# Patient Record
Sex: MALE | Race: WHITE | NOT HISPANIC OR LATINO
[De-identification: names, ages, dates, MRNs, and addresses within clinical notes are randomized per-mention and may not be internally consistent; named-entity substitution may affect disease eponyms.]

---

## 2017-02-08 ENCOUNTER — RX RENEWAL (OUTPATIENT)
Age: 67
End: 2017-02-08

## 2017-03-16 ENCOUNTER — APPOINTMENT (OUTPATIENT)
Dept: PULMONOLOGY | Facility: CLINIC | Age: 67
End: 2017-03-16

## 2017-03-16 VITALS — OXYGEN SATURATION: 96 % | HEART RATE: 62 BPM | DIASTOLIC BLOOD PRESSURE: 68 MMHG | SYSTOLIC BLOOD PRESSURE: 128 MMHG

## 2017-03-16 VITALS — WEIGHT: 255 LBS | BODY MASS INDEX: 38.77 KG/M2

## 2017-03-27 ENCOUNTER — APPOINTMENT (OUTPATIENT)
Dept: PULMONOLOGY | Facility: CLINIC | Age: 67
End: 2017-03-27

## 2017-03-27 VITALS
HEART RATE: 76 BPM | SYSTOLIC BLOOD PRESSURE: 120 MMHG | BODY MASS INDEX: 38.95 KG/M2 | DIASTOLIC BLOOD PRESSURE: 70 MMHG | WEIGHT: 257 LBS | HEIGHT: 68 IN | OXYGEN SATURATION: 97 %

## 2017-03-30 ENCOUNTER — OUTPATIENT (OUTPATIENT)
Dept: OUTPATIENT SERVICES | Facility: HOSPITAL | Age: 67
LOS: 1 days | End: 2017-03-30
Payer: MEDICARE

## 2017-03-30 DIAGNOSIS — G47.33 OBSTRUCTIVE SLEEP APNEA (ADULT) (PEDIATRIC): ICD-10-CM

## 2017-03-30 PROCEDURE — 95811 POLYSOM 6/>YRS CPAP 4/> PARM: CPT | Mod: 26

## 2017-03-30 PROCEDURE — 95811 POLYSOM 6/>YRS CPAP 4/> PARM: CPT

## 2017-04-04 ENCOUNTER — RX RENEWAL (OUTPATIENT)
Age: 67
End: 2017-04-04

## 2017-04-17 ENCOUNTER — APPOINTMENT (OUTPATIENT)
Dept: PULMONOLOGY | Facility: CLINIC | Age: 67
End: 2017-04-17

## 2017-04-17 VITALS — OXYGEN SATURATION: 95 % | HEART RATE: 82 BPM

## 2017-04-17 VITALS — DIASTOLIC BLOOD PRESSURE: 64 MMHG | BODY MASS INDEX: 38.93 KG/M2 | SYSTOLIC BLOOD PRESSURE: 126 MMHG | WEIGHT: 256 LBS

## 2017-04-19 ENCOUNTER — APPOINTMENT (OUTPATIENT)
Dept: PULMONOLOGY | Facility: CLINIC | Age: 67
End: 2017-04-19

## 2017-04-19 VITALS
HEART RATE: 61 BPM | SYSTOLIC BLOOD PRESSURE: 128 MMHG | OXYGEN SATURATION: 98 % | BODY MASS INDEX: 38.62 KG/M2 | RESPIRATION RATE: 16 BRPM | WEIGHT: 254 LBS | DIASTOLIC BLOOD PRESSURE: 80 MMHG

## 2017-04-27 ENCOUNTER — APPOINTMENT (OUTPATIENT)
Dept: CT IMAGING | Facility: CLINIC | Age: 67
End: 2017-04-27

## 2017-05-07 ENCOUNTER — APPOINTMENT (OUTPATIENT)
Dept: POPULATION HEALTH | Facility: CLINIC | Age: 67
End: 2017-05-07
Payer: COMMERCIAL

## 2017-05-07 ENCOUNTER — OUTPATIENT (OUTPATIENT)
Dept: OUTPATIENT SERVICES | Facility: HOSPITAL | Age: 67
LOS: 1 days | End: 2017-05-07
Payer: COMMERCIAL

## 2017-05-07 DIAGNOSIS — Z77.090 CONTACT WITH AND (SUSPECTED) EXPOSURE TO ASBESTOS: ICD-10-CM

## 2017-05-07 PROCEDURE — 94010 BREATHING CAPACITY TEST: CPT

## 2017-05-07 PROCEDURE — 71250 CT THORAX DX C-: CPT | Mod: 26

## 2017-05-07 PROCEDURE — 99202 OFFICE O/P NEW SF 15 MIN: CPT | Mod: 25

## 2017-05-07 PROCEDURE — 71250 CT THORAX DX C-: CPT

## 2017-05-07 PROCEDURE — 93000 ELECTROCARDIOGRAM COMPLETE: CPT

## 2017-05-07 PROCEDURE — 36415 COLL VENOUS BLD VENIPUNCTURE: CPT

## 2017-06-05 ENCOUNTER — MED ADMIN CHARGE (OUTPATIENT)
Age: 67
End: 2017-06-05

## 2017-06-26 ENCOUNTER — RX RENEWAL (OUTPATIENT)
Age: 67
End: 2017-06-26

## 2017-07-18 ENCOUNTER — APPOINTMENT (OUTPATIENT)
Dept: PULMONOLOGY | Facility: CLINIC | Age: 67
End: 2017-07-18

## 2017-07-19 ENCOUNTER — APPOINTMENT (OUTPATIENT)
Dept: PULMONOLOGY | Facility: CLINIC | Age: 67
End: 2017-07-19

## 2017-07-19 VITALS — OXYGEN SATURATION: 95 % | HEART RATE: 63 BPM

## 2017-07-19 VITALS — BODY MASS INDEX: 39.38 KG/M2 | WEIGHT: 259 LBS | SYSTOLIC BLOOD PRESSURE: 124 MMHG | DIASTOLIC BLOOD PRESSURE: 70 MMHG

## 2017-07-19 RX ORDER — AZITHROMYCIN 250 MG/1
250 TABLET, FILM COATED ORAL
Qty: 6 | Refills: 0 | Status: DISCONTINUED | COMMUNITY
Start: 2017-06-27

## 2017-11-11 ENCOUNTER — TRANSCRIPTION ENCOUNTER (OUTPATIENT)
Age: 67
End: 2017-11-11

## 2017-11-17 ENCOUNTER — APPOINTMENT (OUTPATIENT)
Dept: PULMONOLOGY | Facility: CLINIC | Age: 67
End: 2017-11-17
Payer: MEDICARE

## 2017-11-17 VITALS — HEART RATE: 69 BPM | OXYGEN SATURATION: 94 % | SYSTOLIC BLOOD PRESSURE: 128 MMHG | DIASTOLIC BLOOD PRESSURE: 78 MMHG

## 2017-11-17 DIAGNOSIS — R07.89 OTHER CHEST PAIN: ICD-10-CM

## 2017-11-17 PROCEDURE — 85018 HEMOGLOBIN: CPT | Mod: QW

## 2017-11-17 PROCEDURE — 94727 GAS DIL/WSHOT DETER LNG VOL: CPT

## 2017-11-17 PROCEDURE — 99215 OFFICE O/P EST HI 40 MIN: CPT | Mod: 25

## 2017-11-17 PROCEDURE — 94060 EVALUATION OF WHEEZING: CPT

## 2017-11-17 PROCEDURE — 94729 DIFFUSING CAPACITY: CPT

## 2017-11-17 PROCEDURE — 94664 DEMO&/EVAL PT USE INHALER: CPT | Mod: 59

## 2017-11-17 RX ORDER — PIRFENIDONE 267 MG/1
267 CAPSULE ORAL
Qty: 270 | Refills: 1 | Status: DISCONTINUED | OUTPATIENT
Start: 2017-07-19 | End: 2017-11-17

## 2017-11-17 RX ORDER — BRIMONIDINE TARTRATE 1 MG/ML
0.1 SOLUTION/ DROPS OPHTHALMIC
Qty: 5 | Refills: 0 | Status: DISCONTINUED | COMMUNITY
Start: 2017-04-13 | End: 2017-11-17

## 2017-11-17 RX ORDER — PROMETHAZINEHYDROCHLORIDE AND PHENYLEPHRINE HYDROCHLORIDE 6.25; 5 MG/5ML; MG/5ML
6.25-5 SYRUP ORAL
Qty: 240 | Refills: 0 | Status: DISCONTINUED | COMMUNITY
Start: 2017-06-27 | End: 2017-11-17

## 2017-12-06 ENCOUNTER — APPOINTMENT (OUTPATIENT)
Dept: PULMONOLOGY | Facility: CLINIC | Age: 67
End: 2017-12-06
Payer: MEDICARE

## 2017-12-06 VITALS
SYSTOLIC BLOOD PRESSURE: 140 MMHG | OXYGEN SATURATION: 94 % | BODY MASS INDEX: 40.32 KG/M2 | HEART RATE: 78 BPM | WEIGHT: 266 LBS | DIASTOLIC BLOOD PRESSURE: 60 MMHG | HEIGHT: 68 IN

## 2017-12-06 PROCEDURE — 99215 OFFICE O/P EST HI 40 MIN: CPT

## 2018-01-02 ENCOUNTER — RX RENEWAL (OUTPATIENT)
Age: 68
End: 2018-01-02

## 2018-03-12 ENCOUNTER — APPOINTMENT (OUTPATIENT)
Dept: PULMONOLOGY | Facility: CLINIC | Age: 68
End: 2018-03-12
Payer: MEDICARE

## 2018-03-12 VITALS
SYSTOLIC BLOOD PRESSURE: 130 MMHG | DIASTOLIC BLOOD PRESSURE: 70 MMHG | BODY MASS INDEX: 39.86 KG/M2 | HEART RATE: 76 BPM | WEIGHT: 263 LBS | HEIGHT: 68 IN | OXYGEN SATURATION: 94 %

## 2018-03-12 VITALS — OXYGEN SATURATION: 95 %

## 2018-03-12 PROCEDURE — 99215 OFFICE O/P EST HI 40 MIN: CPT

## 2018-04-02 ENCOUNTER — RX RENEWAL (OUTPATIENT)
Age: 68
End: 2018-04-02

## 2018-05-14 ENCOUNTER — RX RENEWAL (OUTPATIENT)
Age: 68
End: 2018-05-14

## 2018-06-12 ENCOUNTER — RX RENEWAL (OUTPATIENT)
Age: 68
End: 2018-06-12

## 2018-07-10 RX ORDER — LOTEPREDNOL ETABONATE 5 MG/ML
0.5 SUSPENSION/ DROPS OPHTHALMIC
Qty: 5 | Refills: 0 | Status: DISCONTINUED | COMMUNITY
Start: 2017-11-09 | End: 2018-07-10

## 2018-07-10 RX ORDER — PIRFENIDONE 267 MG/1
267 CAPSULE ORAL 3 TIMES DAILY
Qty: 270 | Refills: 5 | Status: DISCONTINUED | OUTPATIENT
Start: 2017-12-07 | End: 2018-07-10

## 2018-07-11 ENCOUNTER — APPOINTMENT (OUTPATIENT)
Dept: PULMONOLOGY | Facility: CLINIC | Age: 68
End: 2018-07-11
Payer: MEDICARE

## 2018-07-11 VITALS — SYSTOLIC BLOOD PRESSURE: 132 MMHG | OXYGEN SATURATION: 95 % | DIASTOLIC BLOOD PRESSURE: 70 MMHG | HEART RATE: 64 BPM

## 2018-07-11 VITALS — WEIGHT: 245 LBS | BODY MASS INDEX: 37.25 KG/M2

## 2018-07-11 DIAGNOSIS — Z87.898 PERSONAL HISTORY OF OTHER SPECIFIED CONDITIONS: ICD-10-CM

## 2018-07-11 PROCEDURE — 94727 GAS DIL/WSHOT DETER LNG VOL: CPT

## 2018-07-11 PROCEDURE — 99215 OFFICE O/P EST HI 40 MIN: CPT | Mod: 25

## 2018-07-11 PROCEDURE — 85018 HEMOGLOBIN: CPT | Mod: QW

## 2018-07-11 PROCEDURE — 94729 DIFFUSING CAPACITY: CPT

## 2018-07-11 PROCEDURE — 94010 BREATHING CAPACITY TEST: CPT

## 2018-07-11 RX ORDER — LEVOCETIRIZINE DIHYDROCHLORIDE 5 MG/1
5 TABLET ORAL
Qty: 7 | Refills: 0 | Status: DISCONTINUED | COMMUNITY
Start: 2016-10-18 | End: 2018-07-11

## 2018-07-26 ENCOUNTER — MESSAGE (OUTPATIENT)
Age: 68
End: 2018-07-26

## 2018-08-06 ENCOUNTER — RX RENEWAL (OUTPATIENT)
Age: 68
End: 2018-08-06

## 2018-09-23 ENCOUNTER — OUTPATIENT (OUTPATIENT)
Dept: OUTPATIENT SERVICES | Facility: HOSPITAL | Age: 68
LOS: 1 days | End: 2018-09-23
Payer: COMMERCIAL

## 2018-09-23 ENCOUNTER — APPOINTMENT (OUTPATIENT)
Dept: POPULATION HEALTH | Facility: CLINIC | Age: 68
End: 2018-09-23
Payer: COMMERCIAL

## 2018-09-23 DIAGNOSIS — Z77.090 CONTACT WITH AND (SUSPECTED) EXPOSURE TO ASBESTOS: ICD-10-CM

## 2018-09-23 PROCEDURE — 94010 BREATHING CAPACITY TEST: CPT

## 2018-09-23 PROCEDURE — 99212 OFFICE O/P EST SF 10 MIN: CPT | Mod: 25

## 2018-09-23 PROCEDURE — 93000 ELECTROCARDIOGRAM COMPLETE: CPT

## 2018-09-23 PROCEDURE — 36415 COLL VENOUS BLD VENIPUNCTURE: CPT

## 2018-09-23 PROCEDURE — 71250 CT THORAX DX C-: CPT

## 2018-09-23 PROCEDURE — 71250 CT THORAX DX C-: CPT | Mod: 26

## 2018-11-15 ENCOUNTER — APPOINTMENT (OUTPATIENT)
Dept: PULMONOLOGY | Facility: CLINIC | Age: 68
End: 2018-11-15
Payer: MEDICARE

## 2018-11-15 VITALS
OXYGEN SATURATION: 95 % | HEART RATE: 64 BPM | SYSTOLIC BLOOD PRESSURE: 142 MMHG | BODY MASS INDEX: 39.53 KG/M2 | DIASTOLIC BLOOD PRESSURE: 82 MMHG | WEIGHT: 260 LBS

## 2018-11-15 PROCEDURE — 99214 OFFICE O/P EST MOD 30 MIN: CPT

## 2018-11-15 RX ORDER — LISINOPRIL 20 MG/1
20 TABLET ORAL
Refills: 0 | Status: DISCONTINUED | COMMUNITY
Start: 2017-11-17 | End: 2018-11-15

## 2018-11-15 RX ORDER — PIRFENIDONE 267 MG/1
267 CAPSULE ORAL
Qty: 207 | Refills: 0 | Status: DISCONTINUED | OUTPATIENT
Start: 2017-07-19 | End: 2018-11-15

## 2018-11-15 RX ORDER — PIRFENIDONE 267 MG/1
267 TABLET, COATED ORAL
Qty: 270 | Refills: 5 | Status: DISCONTINUED | OUTPATIENT
Start: 2018-01-02 | End: 2018-11-15

## 2018-11-15 RX ORDER — TIOTROPIUM BROMIDE 18 UG/1
18 CAPSULE ORAL; RESPIRATORY (INHALATION) DAILY
Qty: 1 | Refills: 5 | Status: DISCONTINUED | COMMUNITY
Start: 2017-12-06 | End: 2018-11-15

## 2019-03-01 ENCOUNTER — RX RENEWAL (OUTPATIENT)
Age: 69
End: 2019-03-01

## 2019-03-01 ENCOUNTER — MEDICATION RENEWAL (OUTPATIENT)
Age: 69
End: 2019-03-01

## 2019-03-01 RX ORDER — TIOTROPIUM BROMIDE 18 UG/1
18 CAPSULE ORAL; RESPIRATORY (INHALATION) DAILY
Qty: 1 | Refills: 5 | Status: DISCONTINUED | COMMUNITY
Start: 2018-11-15 | End: 2019-03-01

## 2019-03-03 ENCOUNTER — TRANSCRIPTION ENCOUNTER (OUTPATIENT)
Age: 69
End: 2019-03-03

## 2019-03-14 ENCOUNTER — APPOINTMENT (OUTPATIENT)
Dept: PULMONOLOGY | Facility: CLINIC | Age: 69
End: 2019-03-14
Payer: MEDICARE

## 2019-03-14 VITALS — BODY MASS INDEX: 40.45 KG/M2 | WEIGHT: 266 LBS

## 2019-03-14 VITALS — DIASTOLIC BLOOD PRESSURE: 60 MMHG | OXYGEN SATURATION: 95 % | SYSTOLIC BLOOD PRESSURE: 120 MMHG | HEART RATE: 64 BPM

## 2019-03-14 PROCEDURE — 94729 DIFFUSING CAPACITY: CPT

## 2019-03-14 PROCEDURE — 94010 BREATHING CAPACITY TEST: CPT

## 2019-03-14 PROCEDURE — 99214 OFFICE O/P EST MOD 30 MIN: CPT | Mod: 25

## 2019-03-14 PROCEDURE — 85018 HEMOGLOBIN: CPT | Mod: QW

## 2019-03-14 PROCEDURE — 94727 GAS DIL/WSHOT DETER LNG VOL: CPT

## 2019-03-14 NOTE — PHYSICAL EXAM
[General Appearance - Well Developed] : well developed [Normal Appearance] : normal appearance [General Appearance - Well Nourished] : well nourished [No Deformities] : no deformities [III] : III [Neck Appearance] : the appearance of the neck was normal [Neck Cervical Mass (___cm)] : no neck mass was observed [Heart Rate And Rhythm] : heart rate and rhythm were normal [Heart Sounds] : normal S1 and S2 [Murmurs] : no murmurs present [Edema] : no peripheral edema present [Respiration, Rhythm And Depth] : normal respiratory rhythm and effort [Exaggerated Use Of Accessory Muscles For Inspiration] : no accessory muscle use [Lungs Percussion] : the lungs were normal to percussion [Abnormal Walk] : normal gait [Nail Clubbing] : no clubbing of the fingernails [No Focal Deficits] : no focal deficits [Oriented To Time, Place, And Person] : oriented to person, place, and time [Impaired Insight] : insight and judgment were intact [Affect] : the affect was normal [Memory Recent] : recent memory was not impaired [] : no rash [FreeTextEntry1] : no chest wall abn

## 2019-03-14 NOTE — CONSULT LETTER
[Dear  ___] : Dear  [unfilled], [Consult Letter:] : I had the pleasure of evaluating your patient, [unfilled]. [Please see my note below.] : Please see my note below. [Consult Closing:] : Thank you very much for allowing me to participate in the care of this patient.  If you have any questions, please do not hesitate to contact me. [Sincerely,] : Sincerely, [DrIsaias  ___] : Dr. CHAPA [[facility]kinson [facility], [facility]] : [facility], [facility] [Director, Respiratory Care] : Director, Respiratory Care [[facility]] : [facility] [FreeTextEntry3] : [facility]\par Pul[facility] Critical Care\par Director Pulmonary Division\par Medical Director Respiratory Therapy\par [facility]\par \par

## 2019-03-14 NOTE — PROCEDURE
[FreeTextEntry1] : CT chest reviewed May 14, 2016 mild interstitial nodular/centrilobular densities with mild emphysema small calcified nodules noted\par Recent spirometry with moderate airflow obstruction\par \par CTA reviewed 9/16;\par No pulmonary embolism mild interstitial prominence at bases\par \par Echo:  7/18normal LV, mild LAE, no sig pul htn\par CT ( [facility]) : c/w UIP, bronchial wall thickening\par PFTS:  today combined obstructive and restrictive impairment, mod decrease in DL which corrects\par compared to 11/17, FVC and DL are increased, mild decrease TLC of ? significance\par

## 2019-03-14 NOTE — REASON FOR VISIT
[Follow-Up] : a follow-up visit [Chest Pain] : chest Pain [COPD] : COPD [ILD] : ILD [Shortness of Breath] : shortness of Breath

## 2019-03-14 NOTE — DISCUSSION/SUMMARY
[FreeTextEntry1] : COPD Gold class II complicated by interstitial lung disease component /clinical UIP-  vs asbestosis\par connective tissue serologies are negative, was tolerating perfenidone but denies benefit and stopped, saw second opinion who felt more asbestosis related, pt seeking legal assistance for that \par My review of pulmonary ILD with areas of ossification were less clear and included UIP, NSIP, amyloid\par  also focus on anti reflux therapy given ILD, denies any GERD, offered tertiary care center, he does not want another opinion at this time\par clinically at baseline\par PFTs are stable with exception of decreased DLCO from 2018,\par will repeat CT chest for any progression of ILD\par continue spiriva for COPD component\par obesity with moderate  obstructive sleep apnea - ( AHI 20) intolerant to CPAP in past, saw Sleep MD for oral appliance, compliance stressed\par 4 months or sooner if needed

## 2019-03-14 NOTE — HISTORY OF PRESENT ILLNESS
[FreeTextEntry1] : at baseline\par some weight loss\par  no sig sputum\par no sig change in status\par chronic exertional  dyspnea, no change\par using spiriva\par had sleep study, mod NJ ( AHI 20) has  oral appliance\par former 25 pack yrs smoker, quit 25 yrs ago\par Cardiology [place] gp\par

## 2019-04-25 ENCOUNTER — RX RENEWAL (OUTPATIENT)
Age: 69
End: 2019-04-25

## 2019-05-22 ENCOUNTER — RX RENEWAL (OUTPATIENT)
Age: 69
End: 2019-05-22

## 2019-05-23 ENCOUNTER — FORM ENCOUNTER (OUTPATIENT)
Age: 69
End: 2019-05-23

## 2019-05-24 ENCOUNTER — OUTPATIENT (OUTPATIENT)
Dept: OUTPATIENT SERVICES | Facility: HOSPITAL | Age: 69
LOS: 1 days | End: 2019-05-24

## 2019-05-24 ENCOUNTER — APPOINTMENT (OUTPATIENT)
Dept: CT IMAGING | Facility: CLINIC | Age: 69
End: 2019-05-24
Payer: MEDICARE

## 2019-05-24 DIAGNOSIS — J84.9 INTERSTITIAL PULMONARY DISEASE, UNSPECIFIED: ICD-10-CM

## 2019-05-24 PROCEDURE — 71250 CT THORAX DX C-: CPT | Mod: 26

## 2019-06-10 ENCOUNTER — APPOINTMENT (OUTPATIENT)
Dept: FAMILY MEDICINE | Facility: CLINIC | Age: 69
End: 2019-06-10
Payer: MEDICARE

## 2019-06-10 VITALS — WEIGHT: 266 LBS | TEMPERATURE: 97.6 F | HEIGHT: 68 IN | BODY MASS INDEX: 40.32 KG/M2

## 2019-06-10 VITALS — HEART RATE: 76 BPM | DIASTOLIC BLOOD PRESSURE: 75 MMHG | SYSTOLIC BLOOD PRESSURE: 125 MMHG

## 2019-06-10 PROCEDURE — 99214 OFFICE O/P EST MOD 30 MIN: CPT | Mod: 25

## 2019-06-10 PROCEDURE — 81003 URINALYSIS AUTO W/O SCOPE: CPT | Mod: QW

## 2019-06-10 PROCEDURE — 36415 COLL VENOUS BLD VENIPUNCTURE: CPT

## 2019-06-10 NOTE — PHYSICAL EXAM
[No Acute Distress] : no acute distress [Well Nourished] : well nourished [Normal Sclera/Conjunctiva] : normal sclera/conjunctiva [Well-Appearing] : well-appearing [Well Developed] : well developed [PERRL] : pupils equal round and reactive to light [EOMI] : extraocular movements intact [Normal Outer Ear/Nose] : the outer ears and nose were normal in appearance [Normal Oropharynx] : the oropharynx was normal [No JVD] : no jugular venous distention [Supple] : supple [No Lymphadenopathy] : no lymphadenopathy [No Respiratory Distress] : no respiratory distress  [Thyroid Normal, No Nodules] : the thyroid was normal and there were no nodules present [Clear to Auscultation] : lungs were clear to auscultation bilaterally [No Accessory Muscle Use] : no accessory muscle use [Normal Rate] : normal rate  [Regular Rhythm] : with a regular rhythm [Normal S1, S2] : normal S1 and S2 [No Carotid Bruits] : no carotid bruits [No Murmur] : no murmur heard [No Abdominal Bruit] : a ~M bruit was not heard ~T in the abdomen [No Varicosities] : no varicosities [Pedal Pulses Present] : the pedal pulses are present [No Extremity Clubbing/Cyanosis] : no extremity clubbing/cyanosis [No Edema] : there was no peripheral edema [No Palpable Aorta] : no palpable aorta [Soft] : abdomen soft [Non Tender] : non-tender [Non-distended] : non-distended [No Masses] : no abdominal mass palpated [No HSM] : no HSM [Normal Bowel Sounds] : normal bowel sounds [Normal Anterior Cervical Nodes] : no anterior cervical lymphadenopathy [Normal Posterior Cervical Nodes] : no posterior cervical lymphadenopathy [No Spinal Tenderness] : no spinal tenderness [No CVA Tenderness] : no CVA  tenderness [No Joint Swelling] : no joint swelling [Grossly Normal Strength/Tone] : grossly normal strength/tone [Normal Gait] : normal gait [No Rash] : no rash [Coordination Grossly Intact] : coordination grossly intact [Deep Tendon Reflexes (DTR)] : deep tendon reflexes were 2+ and symmetric [No Focal Deficits] : no focal deficits [Normal Insight/Judgement] : insight and judgment were intact [Normal Affect] : the affect was normal

## 2019-06-12 LAB
ALBUMIN SERPL ELPH-MCNC: 4.3 G/DL
ALP BLD-CCNC: 107 U/L
ALT SERPL-CCNC: 23 U/L
ANION GAP SERPL CALC-SCNC: 13 MMOL/L
AST SERPL-CCNC: 14 U/L
BASOPHILS # BLD AUTO: 0.05 K/UL
BASOPHILS NFR BLD AUTO: 0.5 %
BILIRUB SERPL-MCNC: 0.6 MG/DL
BILIRUB UR QL STRIP: NORMAL
BUN SERPL-MCNC: 14 MG/DL
CALCIUM SERPL-MCNC: 9.6 MG/DL
CHLORIDE SERPL-SCNC: 103 MMOL/L
CHOLEST SERPL-MCNC: 122 MG/DL
CHOLEST/HDLC SERPL: 3.5 RATIO
CO2 SERPL-SCNC: 26 MMOL/L
COLLECTION METHOD: NORMAL
CREAT SERPL-MCNC: 1.44 MG/DL
EOSINOPHIL # BLD AUTO: 0.25 K/UL
EOSINOPHIL NFR BLD AUTO: 2.5 %
GLUCOSE SERPL-MCNC: 179 MG/DL
GLUCOSE UR-MCNC: NORMAL
HCG UR QL: 0.2 EU/DL
HCT VFR BLD CALC: 44.5 %
HDLC SERPL-MCNC: 35 MG/DL
HGB BLD-MCNC: 13.3 G/DL
HGB UR QL STRIP.AUTO: NORMAL
IMM GRANULOCYTES NFR BLD AUTO: 0.6 %
KETONES UR-MCNC: NORMAL
LDLC SERPL CALC-MCNC: 60 MG/DL
LEUKOCYTE ESTERASE UR QL STRIP: NORMAL
LYMPHOCYTES # BLD AUTO: 2.17 K/UL
LYMPHOCYTES NFR BLD AUTO: 21.9 %
MAN DIFF?: NORMAL
MCHC RBC-ENTMCNC: 29.9 GM/DL
MCHC RBC-ENTMCNC: 30.4 PG
MCV RBC AUTO: 101.6 FL
MONOCYTES # BLD AUTO: 0.51 K/UL
MONOCYTES NFR BLD AUTO: 5.1 %
NEUTROPHILS # BLD AUTO: 6.87 K/UL
NEUTROPHILS NFR BLD AUTO: 69.4 %
NITRITE UR QL STRIP: NORMAL
PH UR STRIP: 6
PLATELET # BLD AUTO: 266 K/UL
POTASSIUM SERPL-SCNC: 4.5 MMOL/L
PROT SERPL-MCNC: 7 G/DL
PROT UR STRIP-MCNC: NORMAL
PSA SERPL-MCNC: 3.97 NG/ML
RBC # BLD: 4.38 M/UL
RBC # FLD: 14.3 %
SODIUM SERPL-SCNC: 142 MMOL/L
SP GR UR STRIP: 1
T4 FREE SERPL-MCNC: 1 NG/DL
TRIGL SERPL-MCNC: 137 MG/DL
TSH SERPL-ACNC: 3.91 UIU/ML
WBC # FLD AUTO: 9.91 K/UL

## 2019-07-18 ENCOUNTER — APPOINTMENT (OUTPATIENT)
Dept: PULMONOLOGY | Facility: CLINIC | Age: 69
End: 2019-07-18
Payer: MEDICARE

## 2019-07-18 VITALS
WEIGHT: 265 LBS | HEART RATE: 66 BPM | OXYGEN SATURATION: 94 % | SYSTOLIC BLOOD PRESSURE: 136 MMHG | DIASTOLIC BLOOD PRESSURE: 76 MMHG | BODY MASS INDEX: 40.29 KG/M2

## 2019-07-18 PROCEDURE — 99214 OFFICE O/P EST MOD 30 MIN: CPT

## 2019-07-18 NOTE — DISCUSSION/SUMMARY
[FreeTextEntry1] : COPD Gold class II complicated by interstitial lung disease component /clinical UIP-  vs asbestosis\par connective tissue serologies are negative, was tolerating perfenidone but denies benefit and stopped, saw second opinion who felt more asbestosis related, pt seeking legal assistance for that \par My review of pulmonary ILD with areas of ossification were less clear and included UIP, NSIP, amyloid\par  also focus on anti reflux therapy given ILD, denies any GERD, offered tertiary care center, he does not want another opinion at this time\par repeat CT 5/19 stable\par clinically at baseline\par PFTs are stable with exception of decreased DLCO from 2018,\par continue spiriva for COPD component\par obesity with moderate  obstructive sleep apnea - ( AHI 20) intolerant to CPAP in past, saw Sleep MD for oral appliance, compliance stressed\par weight loss\par 6 months or sooner if needed

## 2019-07-18 NOTE — PHYSICAL EXAM
[General Appearance - Well Developed] : well developed [Normal Appearance] : normal appearance [General Appearance - Well Nourished] : well nourished [No Deformities] : no deformities [III] : III [Neck Appearance] : the appearance of the neck was normal [Neck Cervical Mass (___cm)] : no neck mass was observed [Heart Rate And Rhythm] : heart rate and rhythm were normal [Heart Sounds] : normal S1 and S2 [Murmurs] : no murmurs present [Edema] : no peripheral edema present [Respiration, Rhythm And Depth] : normal respiratory rhythm and effort [Exaggerated Use Of Accessory Muscles For Inspiration] : no accessory muscle use [Lungs Percussion] : the lungs were normal to percussion [FreeTextEntry1] : no chest wall abn [Abnormal Walk] : normal gait [Nail Clubbing] : no clubbing of the fingernails [No Focal Deficits] : no focal deficits [Oriented To Time, Place, And Person] : oriented to person, place, and time [Impaired Insight] : insight and judgment were intact [Affect] : the affect was normal [Memory Recent] : recent memory was not impaired [] : no rash

## 2019-07-18 NOTE — CONSULT LETTER
[Dear  ___] : Dear  [unfilled], [Consult Letter:] : I had the pleasure of evaluating your patient, [unfilled]. [Please see my note below.] : Please see my note below. [Consult Closing:] : Thank you very much for allowing me to participate in the care of this patient.  If you have any questions, please do not hesitate to contact me. [Sincerely,] : Sincerely, [FreeTextEntry3] : [facility]kinson [facility]\par Pulmonary Critical Care\par Director Pulmonary Division\par Medical Director Respiratory Therapy\par [facility]\par \par  [DrIsaias  ___] : Dr. CHAPA [[facility] DO, [facility]] : [facility]e Wilkinson DO, [facility] [Director, Respiratory Care] : Director, Respiratory Care [[facility]] : [facility]

## 2019-07-18 NOTE — HISTORY OF PRESENT ILLNESS
[FreeTextEntry1] : at baseline\par some weight loss\par  no sig sputum\par no sig change in status\par chronic exertional  dyspnea, no change\par using spiriva\par had sleep study, mod NJ ( AHI 20) has  oral appliance\par former 25 pack yrs smoker, quit 25 yrs ago\par Cardiology [place] gp\par

## 2019-08-26 ENCOUNTER — MEDICATION RENEWAL (OUTPATIENT)
Age: 69
End: 2019-08-26

## 2019-09-22 ENCOUNTER — OUTPATIENT (OUTPATIENT)
Dept: OUTPATIENT SERVICES | Facility: HOSPITAL | Age: 69
LOS: 1 days | End: 2019-09-22
Payer: COMMERCIAL

## 2019-09-22 ENCOUNTER — APPOINTMENT (OUTPATIENT)
Dept: POPULATION HEALTH | Facility: CLINIC | Age: 69
End: 2019-09-22
Payer: COMMERCIAL

## 2019-09-22 DIAGNOSIS — Z77.090 CONTACT WITH AND (SUSPECTED) EXPOSURE TO ASBESTOS: ICD-10-CM

## 2019-09-22 PROCEDURE — 94010 BREATHING CAPACITY TEST: CPT

## 2019-09-22 PROCEDURE — 93000 ELECTROCARDIOGRAM COMPLETE: CPT

## 2019-09-22 PROCEDURE — 71250 CT THORAX DX C-: CPT

## 2019-09-22 PROCEDURE — 36415 COLL VENOUS BLD VENIPUNCTURE: CPT

## 2019-09-22 PROCEDURE — 99212 OFFICE O/P EST SF 10 MIN: CPT | Mod: 25

## 2019-09-22 PROCEDURE — 71250 CT THORAX DX C-: CPT | Mod: 26

## 2019-10-07 ENCOUNTER — RX RENEWAL (OUTPATIENT)
Age: 69
End: 2019-10-07

## 2019-10-08 ENCOUNTER — APPOINTMENT (OUTPATIENT)
Dept: FAMILY MEDICINE | Facility: CLINIC | Age: 69
End: 2019-10-08
Payer: MEDICARE

## 2019-10-08 PROCEDURE — 90662 IIV NO PRSV INCREASED AG IM: CPT

## 2019-10-08 PROCEDURE — G0008: CPT

## 2020-01-24 ENCOUNTER — APPOINTMENT (OUTPATIENT)
Dept: PULMONOLOGY | Facility: CLINIC | Age: 70
End: 2020-01-24
Payer: MEDICARE

## 2020-01-24 VITALS
OXYGEN SATURATION: 94 % | BODY MASS INDEX: 42.12 KG/M2 | WEIGHT: 277 LBS | DIASTOLIC BLOOD PRESSURE: 80 MMHG | RESPIRATION RATE: 16 BRPM | SYSTOLIC BLOOD PRESSURE: 140 MMHG | HEART RATE: 72 BPM

## 2020-01-24 PROCEDURE — 94010 BREATHING CAPACITY TEST: CPT

## 2020-01-24 PROCEDURE — 85018 HEMOGLOBIN: CPT | Mod: QW

## 2020-01-24 PROCEDURE — 94727 GAS DIL/WSHOT DETER LNG VOL: CPT

## 2020-01-24 PROCEDURE — 99214 OFFICE O/P EST MOD 30 MIN: CPT | Mod: 25

## 2020-01-24 PROCEDURE — 94729 DIFFUSING CAPACITY: CPT

## 2020-01-24 NOTE — HISTORY OF PRESENT ILLNESS
[FreeTextEntry1] : at baseline\par  no sig sputum\par no sig change in status\par chronic exertional  dyspnea, no change\par using spiriva\par had sleep study, mod NJ ( AHI 20) has  oral appliance\par former 25 pack yrs smoker, quit 25 yrs ago\par Cardiology [place] gp\par

## 2020-01-24 NOTE — PHYSICAL EXAM
[General Appearance - Well Developed] : well developed [Normal Appearance] : normal appearance [General Appearance - Well Nourished] : well nourished [No Deformities] : no deformities [III] : III [Neck Cervical Mass (___cm)] : no neck mass was observed [Neck Appearance] : the appearance of the neck was normal [Heart Sounds] : normal S1 and S2 [Heart Rate And Rhythm] : heart rate and rhythm were normal [Murmurs] : no murmurs present [Edema] : no peripheral edema present [Respiration, Rhythm And Depth] : normal respiratory rhythm and effort [Exaggerated Use Of Accessory Muscles For Inspiration] : no accessory muscle use [Lungs Percussion] : the lungs were normal to percussion [Abnormal Walk] : normal gait [Nail Clubbing] : no clubbing of the fingernails [No Focal Deficits] : no focal deficits [Oriented To Time, Place, And Person] : oriented to person, place, and time [Impaired Insight] : insight and judgment were intact [Affect] : the affect was normal [Memory Recent] : recent memory was not impaired [] : no rash [FreeTextEntry1] : no chest wall abn

## 2020-01-24 NOTE — DISCUSSION/SUMMARY
[FreeTextEntry1] : COPD Gold class II complicated by interstitial lung disease component /clinical UIP-  vs asbestosis\par connective tissue serologies are negative, was tolerating perfenidone but denies benefit and stopped, saw second opinion who felt more asbestosis related, pt getting legal assistance for that \par My review of pulmonary ILD with areas of ossification were less clear and included UIP, NSIP, amyloid\par  also focus on anti reflux therapy given ILD, denies any GERD, offered tertiary care center, he does not want another opinion at this time\par repeat CT 5/19 stable, repeat 5/20\par clinically at baseline\par PFTs are stable \par continue spiriva for COPD component\par obesity with moderate  obstructive sleep apnea - ( AHI 20) intolerant to CPAP in past, saw Sleep MD for oral appliance, compliance stressed\par weight loss\par 6 months or sooner if needed

## 2020-01-24 NOTE — CONSULT LETTER
[Please see my note below.] : Please see my note below. [Consult Letter:] : I had the pleasure of evaluating your patient, [unfilled]. [Dear  ___] : Dear  [unfilled], [Sincerely,] : Sincerely, [Consult Closing:] : Thank you very much for allowing me to participate in the care of this patient.  If you have any questions, please do not hesitate to contact me. [DrIsaias  ___] : Dr. CHAPA [[facility] DO, [facility]] : Fede Wiliknson DO, [facility] [[facility]] : [facility] [Director, Respiratory Care] : Director, Respiratory Care [FreeTextEntry3] : [facility] DO [facility]\par Pulmonary Critical Care\par Director Pulmonary Division\par Medical Director Respiratory Therapy\par [facility]\par \par

## 2020-02-05 ENCOUNTER — APPOINTMENT (OUTPATIENT)
Dept: FAMILY MEDICINE | Facility: CLINIC | Age: 70
End: 2020-02-05
Payer: MEDICARE

## 2020-02-05 VITALS — HEART RATE: 78 BPM | SYSTOLIC BLOOD PRESSURE: 120 MMHG | DIASTOLIC BLOOD PRESSURE: 75 MMHG

## 2020-02-05 VITALS
TEMPERATURE: 98.4 F | RESPIRATION RATE: 14 BRPM | BODY MASS INDEX: 38.8 KG/M2 | HEART RATE: 96 BPM | OXYGEN SATURATION: 95 % | HEIGHT: 68 IN | WEIGHT: 256 LBS

## 2020-02-05 DIAGNOSIS — Z87.09 PERSONAL HISTORY OF OTHER DISEASES OF THE RESPIRATORY SYSTEM: ICD-10-CM

## 2020-02-05 DIAGNOSIS — R04.0 EPISTAXIS: ICD-10-CM

## 2020-02-05 PROCEDURE — 99213 OFFICE O/P EST LOW 20 MIN: CPT

## 2020-02-05 NOTE — HISTORY OF PRESENT ILLNESS
[Cold Symptoms] : cold symptoms [___ Days ago] : [unfilled] days ago [Severe] : severe [Constant] : constant [Cough] : cough [Congestion] : congestion [Sore Throat] : sore throat [Chills] : chills [Wheezing] : wheezing [Anorexia] : anorexia [Shortness Of Breath] : shortness of breath [Fatigue] : fatigue [OTC Remedies] : OTC remedies [Worsening] : worsening [At Night] : at night [In Morning] : in the morning [Earache] : no earache [Fever] : no fever [Headache] : no headache [FreeTextEntry7] : throat [FreeTextEntry5] : MUCINEX [FreeTextEntry8] : nose bleeding on right side cough and wheezing

## 2020-03-30 ENCOUNTER — RX RENEWAL (OUTPATIENT)
Age: 70
End: 2020-03-30

## 2020-03-31 ENCOUNTER — RX RENEWAL (OUTPATIENT)
Age: 70
End: 2020-03-31

## 2020-05-27 ENCOUNTER — RX RENEWAL (OUTPATIENT)
Age: 70
End: 2020-05-27

## 2020-06-03 ENCOUNTER — APPOINTMENT (OUTPATIENT)
Dept: FAMILY MEDICINE | Facility: CLINIC | Age: 70
End: 2020-06-03

## 2020-06-16 ENCOUNTER — APPOINTMENT (OUTPATIENT)
Dept: CT IMAGING | Facility: CLINIC | Age: 70
End: 2020-06-16
Payer: MEDICARE

## 2020-06-16 ENCOUNTER — OUTPATIENT (OUTPATIENT)
Dept: OUTPATIENT SERVICES | Facility: HOSPITAL | Age: 70
LOS: 1 days | End: 2020-06-16

## 2020-06-16 DIAGNOSIS — J84.9 INTERSTITIAL PULMONARY DISEASE, UNSPECIFIED: ICD-10-CM

## 2020-06-16 PROCEDURE — 71250 CT THORAX DX C-: CPT | Mod: 26

## 2020-07-15 ENCOUNTER — APPOINTMENT (OUTPATIENT)
Dept: PULMONOLOGY | Facility: CLINIC | Age: 70
End: 2020-07-15
Payer: MEDICARE

## 2020-07-15 VITALS
OXYGEN SATURATION: 95 % | BODY MASS INDEX: 39.08 KG/M2 | DIASTOLIC BLOOD PRESSURE: 78 MMHG | WEIGHT: 257 LBS | SYSTOLIC BLOOD PRESSURE: 134 MMHG | HEART RATE: 65 BPM

## 2020-07-15 PROCEDURE — 99214 OFFICE O/P EST MOD 30 MIN: CPT

## 2020-07-15 RX ORDER — METHYLPREDNISOLONE 4 MG/1
4 TABLET ORAL
Qty: 1 | Refills: 0 | Status: DISCONTINUED | COMMUNITY
Start: 2020-02-05 | End: 2020-07-15

## 2020-07-15 RX ORDER — CIPROFLOXACIN HYDROCHLORIDE 500 MG/1
500 TABLET, FILM COATED ORAL
Qty: 20 | Refills: 0 | Status: DISCONTINUED | COMMUNITY
Start: 2020-02-05 | End: 2020-07-15

## 2020-07-15 RX ORDER — ATORVASTATIN CALCIUM 40 MG/1
40 TABLET, FILM COATED ORAL
Qty: 1 | Refills: 3 | Status: DISCONTINUED | COMMUNITY
End: 2020-07-15

## 2020-07-15 RX ORDER — TIOTROPIUM BROMIDE INHALATION SPRAY 3.12 UG/1
2.5 SPRAY, METERED RESPIRATORY (INHALATION)
Qty: 3 | Refills: 1 | Status: DISCONTINUED | COMMUNITY
Start: 2020-03-31 | End: 2020-07-15

## 2020-07-15 NOTE — HISTORY OF PRESENT ILLNESS
[FreeTextEntry1] : \par  [TextBox_4] : at baseline  no sig sputum no sig change in status chronic exertional  dyspnea, no change using spiriva had sleep study, mod NJ ( AHI 20) has  oral appliance former 25 pack yrs smoker, quit 25 yrs ago Cardiology [place] gp

## 2020-07-15 NOTE — DISCUSSION/SUMMARY
[FreeTextEntry1] : COPD Gold class II complicated by interstitial lung disease component /clinical UIP\par connective tissue serologies are negative, was tolerating pirfenidone but denies benefit and stopped, saw second opinion who felt more asbestosis related, pt getting legal assistance for that \par My review of pulmonary ILD with areas of ossification were less clear and included UIP, NSIP, amyloid\par  also focus on anti reflux therapy given ILD, denies any GERD, offered tertiary care center, he does not want another opinion at this time or bx\par repeat CT 6/20 stable, \par clinically at baseline\par PFTs have been  stable, will repeat at follow up\par continue spiriva for COPD component\par obesity with moderate  obstructive sleep apnea - ( AHI 20) intolerant to CPAP in past, saw Sleep MD for oral appliance, compliance stressed\par weight loss\par 4 months or sooner if needed

## 2020-07-15 NOTE — REASON FOR VISIT
[Follow-Up] : a follow-up visit [Chest Pain] : chest Pain [ILD] : ILD [COPD] : COPD [Shortness of Breath] : shortness of Breath

## 2020-07-15 NOTE — PROCEDURE
[FreeTextEntry1] : CT 6/20: stable reticular abn, peripheral and subpleural, taction bronchiectasis- prob IPF\par

## 2020-07-15 NOTE — CONSULT LETTER
[Consult Letter:] : I had the pleasure of evaluating your patient, [unfilled]. [Dear  ___] : Dear  [unfilled], [Please see my note below.] : Please see my note below. [Sincerely,] : Sincerely, [Consult Closing:] : Thank you very much for allowing me to participate in the care of this patient.  If you have any questions, please do not hesitate to contact me. [DrIsaias  ___] : Dr. CHAPA [[facility], [facility]] : Fed[facility] DO, [facility] [Director, Respiratory Care] : Director, Respiratory Care [[facility]] : [facility] [FreeTextEntry3] : [facility] DO [facility]\par Pulmonary Critical Care\par Director Pulmonary Division\par Medical Director Respiratory Therapy\par [facility]\par \par

## 2020-07-15 NOTE — PHYSICAL EXAM
[General Appearance - Well Developed] : well developed [Normal Appearance] : normal appearance [General Appearance - Well Nourished] : well nourished [No Deformities] : no deformities [Neck Appearance] : the appearance of the neck was normal [III] : III [Neck Cervical Mass (___cm)] : no neck mass was observed [Heart Rate And Rhythm] : heart rate and rhythm were normal [Heart Sounds] : normal S1 and S2 [Murmurs] : no murmurs present [Edema] : no peripheral edema present [Exaggerated Use Of Accessory Muscles For Inspiration] : no accessory muscle use [Respiration, Rhythm And Depth] : normal respiratory rhythm and effort [Lungs Percussion] : the lungs were normal to percussion [Abnormal Walk] : normal gait [Nail Clubbing] : no clubbing of the fingernails [No Focal Deficits] : no focal deficits [Oriented To Time, Place, And Person] : oriented to person, place, and time [Memory Recent] : recent memory was not impaired [Affect] : the affect was normal [Impaired Insight] : insight and judgment were intact [] : no rash [FreeTextEntry1] : no chest wall abn

## 2020-08-19 ENCOUNTER — RX RENEWAL (OUTPATIENT)
Age: 70
End: 2020-08-19

## 2020-09-09 ENCOUNTER — APPOINTMENT (OUTPATIENT)
Dept: FAMILY MEDICINE | Facility: CLINIC | Age: 70
End: 2020-09-09
Payer: MEDICARE

## 2020-09-09 DIAGNOSIS — Z23 ENCOUNTER FOR IMMUNIZATION: ICD-10-CM

## 2020-09-09 PROCEDURE — 90662 IIV NO PRSV INCREASED AG IM: CPT

## 2020-09-09 PROCEDURE — G0008: CPT

## 2020-09-09 NOTE — PROCEDURE
[Injection] : Injection [Bleeding] : bleeding [Left] : on the left.   [Infection] : infection [Allergic Reaction] : allergic reaction [Risk] : Risk [Patient] : patient [Benefits] : benefits [Alcohol] : Alcohol [Bandage Applied] : a bandage [Verbal Consent Obtained] : verbal consent was obtained prior to the procedure [Tolerated Well] : the patient tolerated the procedure well [None] : None [de-identified] : deltoid [FreeTextEntry1] : seasonal flu vaccine [FreeTextEntry3] : pt received injection per order. counseling provided. pt tolerated injection well. with no s/s of adverse reaction noted.

## 2020-09-09 NOTE — REASON FOR VISIT
[Procedure: _________] : a [unfilled] procedure visit [Spouse] : spouse [FreeTextEntry1] : seasonal flu injection

## 2020-11-20 ENCOUNTER — APPOINTMENT (OUTPATIENT)
Dept: PULMONOLOGY | Facility: CLINIC | Age: 70
End: 2020-11-20
Payer: MEDICARE

## 2020-11-20 PROCEDURE — ZZZZZ: CPT

## 2020-11-23 ENCOUNTER — APPOINTMENT (OUTPATIENT)
Dept: PULMONOLOGY | Facility: CLINIC | Age: 70
End: 2020-11-23
Payer: MEDICARE

## 2020-11-23 VITALS
RESPIRATION RATE: 16 BRPM | SYSTOLIC BLOOD PRESSURE: 120 MMHG | HEART RATE: 64 BPM | OXYGEN SATURATION: 96 % | DIASTOLIC BLOOD PRESSURE: 80 MMHG

## 2020-11-23 VITALS — HEIGHT: 68 IN | WEIGHT: 259 LBS | BODY MASS INDEX: 39.25 KG/M2

## 2020-11-23 LAB — SARS-COV-2 N GENE NPH QL NAA+PROBE: NOT DETECTED

## 2020-11-23 PROCEDURE — 94010 BREATHING CAPACITY TEST: CPT

## 2020-11-23 PROCEDURE — 99214 OFFICE O/P EST MOD 30 MIN: CPT | Mod: 25

## 2020-11-23 PROCEDURE — 94727 GAS DIL/WSHOT DETER LNG VOL: CPT

## 2020-11-23 PROCEDURE — 94729 DIFFUSING CAPACITY: CPT

## 2020-11-23 PROCEDURE — 85018 HEMOGLOBIN: CPT | Mod: QW

## 2020-11-23 NOTE — CONSULT LETTER
[Dear  ___] : Dear  [unfilled], [Consult Letter:] : I had the pleasure of evaluating your patient, [unfilled]. [Please see my note below.] : Please see my note below. [Consult Closing:] : Thank you very much for allowing me to participate in the care of this patient.  If you have any questions, please do not hesitate to contact me. [Sincerely,] : Sincerely, [DrIsaias  ___] : Dr. CHAPA [[facility], [facility]] : [facility], [facility] [Director, Respiratory Care] : Director, Respiratory Care [[facility]] : [facility] [FreeTextEntry3] : [facility] DO [facility]\par Pulmonary Critical Care\par Director Pulmonary Division\par Medical Director Respiratory Therapy\par [facility]\par \par

## 2020-11-23 NOTE — END OF VISIT
30-May-2020 [Time Spent: ___ minutes] : I have spent [unfilled] minutes of face to face time with the patient [>50% of Time Spent on Counseling and Coordination of Care for  ___] : Greater than 50% of the encounter time was spent on counseling and coordination of care for [unfilled]

## 2020-11-23 NOTE — HISTORY OF PRESENT ILLNESS
[TextBox_4] : at baseline  no sig sputum\par  no sig change in status\par  chronic exertional  dyspnea, no change\par  using spiriva \par had sleep study, mod NJ ( AHI 20) has  oral appliance\par  former 25 pack yrs smoker, quit 25 yrs ago Cardiology [place] gp [FreeTextEntry1] : \par

## 2020-11-23 NOTE — PROCEDURE
[FreeTextEntry1] : CT 6/20: stable reticular abn, peripheral and subpleural, taction bronchiectasis- prob IPF\par \par PFts mild air flow obstruction, normal TLC, moderate decrease in DLCO which corrects , no sig change from 1/20\par

## 2020-11-23 NOTE — DISCUSSION/SUMMARY
[FreeTextEntry1] : COPD Gold class II complicated by interstitial lung disease component /clinical UIP\par connective tissue serologies are negative, was tolerating pirfenidone but denies benefit and stopped, saw second opinion who felt more asbestosis related, pt getting legal assistance for that \par My review of pulmonary ILD with areas of ossification were less clear and included UIP, NSIP, amyloid\par  also focus on anti reflux therapy given ILD, denies any GERD, offered tertiary care center, he does not want another opinion at this time or bx\par repeat CT 6/20 stable, PFts stable, midl obstruction, normal TLC, no change FVC or DLCO\par clinically at baseline\par continue spiriva for COPD component\par obesity with moderate  obstructive sleep apnea - ( AHI 20) intolerant to CPAP in past, saw Sleep MD for oral appliance, compliance stressed\par weight loss\par 6 months or sooner if needed

## 2020-12-23 PROBLEM — Z87.09 HISTORY OF ACUTE BRONCHITIS: Status: RESOLVED | Noted: 2020-02-05 | Resolved: 2020-12-23

## 2021-04-26 ENCOUNTER — APPOINTMENT (OUTPATIENT)
Dept: FAMILY MEDICINE | Facility: CLINIC | Age: 71
End: 2021-04-26
Payer: MEDICARE

## 2021-04-26 VITALS — HEART RATE: 72 BPM | SYSTOLIC BLOOD PRESSURE: 120 MMHG | DIASTOLIC BLOOD PRESSURE: 80 MMHG

## 2021-04-26 VITALS
BODY MASS INDEX: 39.25 KG/M2 | OXYGEN SATURATION: 95 % | HEART RATE: 70 BPM | TEMPERATURE: 97.2 F | HEIGHT: 68 IN | WEIGHT: 259 LBS

## 2021-04-26 DIAGNOSIS — M25.569 PAIN IN UNSPECIFIED KNEE: ICD-10-CM

## 2021-04-26 DIAGNOSIS — J34.89 OTHER SPECIFIED DISORDERS OF NOSE AND NASAL SINUSES: ICD-10-CM

## 2021-04-26 DIAGNOSIS — E66.9 OBESITY, UNSPECIFIED: ICD-10-CM

## 2021-04-26 LAB
BASOPHILS # BLD AUTO: 0.05 K/UL
BASOPHILS NFR BLD AUTO: 0.4 %
BILIRUB UR QL STRIP: NORMAL
CLARITY UR: CLEAR
COLLECTION METHOD: NORMAL
EOSINOPHIL # BLD AUTO: 0.24 K/UL
EOSINOPHIL NFR BLD AUTO: 2 %
GLUCOSE UR-MCNC: NORMAL
HCG UR QL: 0.2 EU/DL
HCT VFR BLD CALC: 44.5 %
HGB BLD-MCNC: 13.9 G/DL
HGB UR QL STRIP.AUTO: NORMAL
IMM GRANULOCYTES NFR BLD AUTO: 0.7 %
KETONES UR-MCNC: NORMAL
LEUKOCYTE ESTERASE UR QL STRIP: NORMAL
LYMPHOCYTES # BLD AUTO: 2.38 K/UL
LYMPHOCYTES NFR BLD AUTO: 19.6 %
MAN DIFF?: NORMAL
MCHC RBC-ENTMCNC: 29.8 PG
MCHC RBC-ENTMCNC: 31.2 GM/DL
MCV RBC AUTO: 95.3 FL
MONOCYTES # BLD AUTO: 0.85 K/UL
MONOCYTES NFR BLD AUTO: 7 %
NEUTROPHILS # BLD AUTO: 8.52 K/UL
NEUTROPHILS NFR BLD AUTO: 70.3 %
NITRITE UR QL STRIP: NORMAL
PH UR STRIP: 5
PLATELET # BLD AUTO: 274 K/UL
PROT UR STRIP-MCNC: NORMAL
RBC # BLD: 4.67 M/UL
RBC # FLD: 13.6 %
SP GR UR STRIP: >=1.03
WBC # FLD AUTO: 12.12 K/UL

## 2021-04-26 PROCEDURE — G0438: CPT

## 2021-04-26 PROCEDURE — 36415 COLL VENOUS BLD VENIPUNCTURE: CPT

## 2021-04-26 PROCEDURE — 81003 URINALYSIS AUTO W/O SCOPE: CPT | Mod: QW

## 2021-04-26 NOTE — HISTORY OF PRESENT ILLNESS
[FreeTextEntry1] : pt. here for CP. [de-identified] : pain and weakness right knew x 1 year epistaxis

## 2021-04-28 LAB
ALBUMIN SERPL ELPH-MCNC: 4.4 G/DL
ALP BLD-CCNC: 115 U/L
ALT SERPL-CCNC: 18 U/L
ANION GAP SERPL CALC-SCNC: 12 MMOL/L
AST SERPL-CCNC: 13 U/L
BILIRUB SERPL-MCNC: 0.6 MG/DL
BUN SERPL-MCNC: 14 MG/DL
CALCIUM SERPL-MCNC: 10.2 MG/DL
CHLORIDE SERPL-SCNC: 105 MMOL/L
CHOLEST SERPL-MCNC: 136 MG/DL
CO2 SERPL-SCNC: 28 MMOL/L
CREAT SERPL-MCNC: 1.43 MG/DL
ESTIMATED AVERAGE GLUCOSE: 160 MG/DL
GLUCOSE SERPL-MCNC: 113 MG/DL
HBA1C MFR BLD HPLC: 7.2 %
HDLC SERPL-MCNC: 39 MG/DL
LDLC SERPL CALC-MCNC: 72 MG/DL
NONHDLC SERPL-MCNC: 96 MG/DL
POTASSIUM SERPL-SCNC: 4.6 MMOL/L
PROT SERPL-MCNC: 7.2 G/DL
PSA SERPL-MCNC: 2.9 NG/ML
SODIUM SERPL-SCNC: 145 MMOL/L
T4 FREE SERPL-MCNC: 1 NG/DL
TRIGL SERPL-MCNC: 124 MG/DL
TSH SERPL-ACNC: 4.29 UIU/ML

## 2021-05-17 ENCOUNTER — APPOINTMENT (OUTPATIENT)
Dept: PULMONOLOGY | Facility: CLINIC | Age: 71
End: 2021-05-17
Payer: MEDICARE

## 2021-05-17 VITALS — HEART RATE: 67 BPM | RESPIRATION RATE: 16 BRPM | OXYGEN SATURATION: 95 %

## 2021-05-17 VITALS — WEIGHT: 254 LBS | SYSTOLIC BLOOD PRESSURE: 120 MMHG | BODY MASS INDEX: 38.62 KG/M2 | DIASTOLIC BLOOD PRESSURE: 70 MMHG

## 2021-05-17 PROCEDURE — 99213 OFFICE O/P EST LOW 20 MIN: CPT

## 2021-05-17 NOTE — DISCUSSION/SUMMARY
[FreeTextEntry1] : COPD Gold class II complicated by interstitial lung disease component /clinical UIP\par connective tissue serologies are negative, was tolerating pirfenidone but denies benefit and stopped, saw second opinion who felt more asbestosis related, pt getting legal assistance for that \par My review of pulmonary ILD with areas of ossification were less clear and included UIP, NSIP, amyloid\par  also focus on anti reflux therapy given ILD, denies any GERD, offered tertiary care center, he does not want another opinion at this time or bx\par repeat CT 6/20 stable, PFts stable, mild obstruction, normal TLC, no change FVC or DLCO\par clinically at baseline\par continue spiriva for COPD component\par obesity with moderate  obstructive sleep apnea - ( AHI 20) intolerant to CPAP in past, saw Sleep MD for oral appliance, compliance stressed\par weight loss\par had Covid vaccine\par 6 months or sooner if needed

## 2021-05-17 NOTE — HISTORY OF PRESENT ILLNESS
[TextBox_4] : at baseline  no sig sputum\par  no sig change in status\par  chronic exertional  dyspnea, no change\par  using spiriva  respimat\par had sleep study, mod NJ ( AHI 20) has  oral appliance\par   25 pack yrs smoker, quit 25 yrs ago Cardiology [place] gp [FreeTextEntry1] : \par

## 2021-05-17 NOTE — CONSULT LETTER
[Dear  ___] : Dear  [unfilled], [Consult Letter:] : I had the pleasure of evaluating your patient, [unfilled]. [Please see my note below.] : Please see my note below. [Consult Closing:] : Thank you very much for allowing me to participate in the care of this patient.  If you have any questions, please do not hesitate to contact me. [Sincerely,] : Sincerely, [DrIsaias  ___] : Dr. CHAPA [[facility], [facility]] : [facility] DO, [facility] [Director, Respiratory Care] : Director, Respiratory Care [[facility]] : [facility] [FreeTextEntry3] : [facility]\par Pulmonary Critical Care\par Director Pulmonary Division\par Medical Director Respiratory Therapy\par [facility]\par \par

## 2021-06-16 ENCOUNTER — NON-APPOINTMENT (OUTPATIENT)
Age: 71
End: 2021-06-16

## 2021-07-07 ENCOUNTER — RX RENEWAL (OUTPATIENT)
Age: 71
End: 2021-07-07

## 2021-09-17 ENCOUNTER — OUTPATIENT (OUTPATIENT)
Dept: OUTPATIENT SERVICES | Facility: HOSPITAL | Age: 71
LOS: 1 days | End: 2021-09-17
Payer: MEDICARE

## 2021-09-17 VITALS
HEIGHT: 68 IN | HEART RATE: 72 BPM | DIASTOLIC BLOOD PRESSURE: 79 MMHG | TEMPERATURE: 98 F | SYSTOLIC BLOOD PRESSURE: 146 MMHG | OXYGEN SATURATION: 97 % | WEIGHT: 257.06 LBS | RESPIRATION RATE: 15 BRPM

## 2021-09-17 DIAGNOSIS — Z01.818 ENCOUNTER FOR OTHER PREPROCEDURAL EXAMINATION: ICD-10-CM

## 2021-09-17 DIAGNOSIS — Z98.890 OTHER SPECIFIED POSTPROCEDURAL STATES: Chronic | ICD-10-CM

## 2021-09-17 DIAGNOSIS — L71.1 RHINOPHYMA: ICD-10-CM

## 2021-09-17 DIAGNOSIS — Z90.49 ACQUIRED ABSENCE OF OTHER SPECIFIED PARTS OF DIGESTIVE TRACT: Chronic | ICD-10-CM

## 2021-09-17 DIAGNOSIS — J34.89 OTHER SPECIFIED DISORDERS OF NOSE AND NASAL SINUSES: ICD-10-CM

## 2021-09-17 LAB
A1C WITH ESTIMATED AVERAGE GLUCOSE RESULT: 7.3 % — HIGH (ref 4–5.6)
ALBUMIN SERPL ELPH-MCNC: 3.8 G/DL — SIGNIFICANT CHANGE UP (ref 3.3–5)
ALP SERPL-CCNC: 109 U/L — SIGNIFICANT CHANGE UP (ref 40–120)
ALT FLD-CCNC: 31 U/L — SIGNIFICANT CHANGE UP (ref 12–78)
ANION GAP SERPL CALC-SCNC: 4 MMOL/L — LOW (ref 5–17)
APTT BLD: 30 SEC — SIGNIFICANT CHANGE UP (ref 27.5–35.5)
AST SERPL-CCNC: 13 U/L — LOW (ref 15–37)
BILIRUB SERPL-MCNC: 0.6 MG/DL — SIGNIFICANT CHANGE UP (ref 0.2–1.2)
BUN SERPL-MCNC: 15 MG/DL — SIGNIFICANT CHANGE UP (ref 7–23)
CALCIUM SERPL-MCNC: 10 MG/DL — SIGNIFICANT CHANGE UP (ref 8.5–10.1)
CHLORIDE SERPL-SCNC: 108 MMOL/L — SIGNIFICANT CHANGE UP (ref 96–108)
CO2 SERPL-SCNC: 31 MMOL/L — SIGNIFICANT CHANGE UP (ref 22–31)
CREAT SERPL-MCNC: 1.3 MG/DL — SIGNIFICANT CHANGE UP (ref 0.5–1.3)
ESTIMATED AVERAGE GLUCOSE: 163 MG/DL — HIGH (ref 68–114)
GLUCOSE SERPL-MCNC: 149 MG/DL — HIGH (ref 70–99)
HCT VFR BLD CALC: 43.9 % — SIGNIFICANT CHANGE UP (ref 39–50)
HGB BLD-MCNC: 13.8 G/DL — SIGNIFICANT CHANGE UP (ref 13–17)
INR BLD: 0.99 RATIO — SIGNIFICANT CHANGE UP (ref 0.88–1.16)
MCHC RBC-ENTMCNC: 29.7 PG — SIGNIFICANT CHANGE UP (ref 27–34)
MCHC RBC-ENTMCNC: 31.4 GM/DL — LOW (ref 32–36)
MCV RBC AUTO: 94.4 FL — SIGNIFICANT CHANGE UP (ref 80–100)
NRBC # BLD: 0 /100 WBCS — SIGNIFICANT CHANGE UP (ref 0–0)
PLATELET # BLD AUTO: 234 K/UL — SIGNIFICANT CHANGE UP (ref 150–400)
POTASSIUM SERPL-MCNC: 4.6 MMOL/L — SIGNIFICANT CHANGE UP (ref 3.5–5.3)
POTASSIUM SERPL-SCNC: 4.6 MMOL/L — SIGNIFICANT CHANGE UP (ref 3.5–5.3)
PROT SERPL-MCNC: 7.6 G/DL — SIGNIFICANT CHANGE UP (ref 6–8.3)
PROTHROM AB SERPL-ACNC: 11.6 SEC — SIGNIFICANT CHANGE UP (ref 10.6–13.6)
RBC # BLD: 4.65 M/UL — SIGNIFICANT CHANGE UP (ref 4.2–5.8)
RBC # FLD: 13.7 % — SIGNIFICANT CHANGE UP (ref 10.3–14.5)
SODIUM SERPL-SCNC: 143 MMOL/L — SIGNIFICANT CHANGE UP (ref 135–145)
WBC # BLD: 11.12 K/UL — HIGH (ref 3.8–10.5)
WBC # FLD AUTO: 11.12 K/UL — HIGH (ref 3.8–10.5)

## 2021-09-17 PROCEDURE — 85027 COMPLETE CBC AUTOMATED: CPT

## 2021-09-17 PROCEDURE — 80053 COMPREHEN METABOLIC PANEL: CPT

## 2021-09-17 PROCEDURE — 93010 ELECTROCARDIOGRAM REPORT: CPT

## 2021-09-17 PROCEDURE — 36415 COLL VENOUS BLD VENIPUNCTURE: CPT

## 2021-09-17 PROCEDURE — 85730 THROMBOPLASTIN TIME PARTIAL: CPT

## 2021-09-17 PROCEDURE — 93005 ELECTROCARDIOGRAM TRACING: CPT

## 2021-09-17 PROCEDURE — G0463: CPT

## 2021-09-17 PROCEDURE — 85610 PROTHROMBIN TIME: CPT

## 2021-09-17 PROCEDURE — 83036 HEMOGLOBIN GLYCOSYLATED A1C: CPT

## 2021-09-17 RX ORDER — TIOTROPIUM BROMIDE 18 UG/1
2 CAPSULE ORAL; RESPIRATORY (INHALATION)
Qty: 0 | Refills: 0 | DISCHARGE

## 2021-09-17 RX ORDER — METFORMIN HYDROCHLORIDE 850 MG/1
1 TABLET ORAL
Qty: 0 | Refills: 0 | DISCHARGE

## 2021-09-17 NOTE — H&P PST ADULT - PRO PAIN EXPRESSION
How Severe Is Your Skin Lesion?: moderate Has Your Skin Lesion Been Treated?: not been treated Is This A New Presentation, Or A Follow-Up?: Skin Lesion none

## 2021-09-17 NOTE — H&P PST ADULT - NSICDXPASTMEDICALHX_GEN_ALL_CORE_FT
PAST MEDICAL HISTORY:  Diabetes     Hypercholesteremia     Obesity     NJ (obstructive sleep apnea) mouthpiece    NJ and COPD overlap syndrome

## 2021-09-17 NOTE — H&P PST ADULT - HISTORY OF PRESENT ILLNESS
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

## 2021-09-17 NOTE — H&P PST ADULT - ASSESSMENT
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

## 2021-09-22 ENCOUNTER — APPOINTMENT (OUTPATIENT)
Dept: FAMILY MEDICINE | Facility: CLINIC | Age: 71
End: 2021-09-22
Payer: MEDICARE

## 2021-09-22 VITALS — OXYGEN SATURATION: 94 % | HEART RATE: 82 BPM | TEMPERATURE: 97 F

## 2021-09-22 VITALS — HEART RATE: 78 BPM | SYSTOLIC BLOOD PRESSURE: 130 MMHG | DIASTOLIC BLOOD PRESSURE: 76 MMHG

## 2021-09-22 DIAGNOSIS — Z00.8 ENCOUNTER FOR OTHER GENERAL EXAMINATION: ICD-10-CM

## 2021-09-22 PROCEDURE — 99214 OFFICE O/P EST MOD 30 MIN: CPT

## 2021-09-22 NOTE — HISTORY OF PRESENT ILLNESS
[No Pertinent Cardiac History] : no history of aortic stenosis, atrial fibrillation, coronary artery disease, recent myocardial infarction, or implantable device/pacemaker [COPD] : COPD [Sleep Apnea] : sleep apnea [No Adverse Anesthesia Reaction] : no adverse anesthesia reaction in self or family member [Diabetes] : diabetes [Smoker] : not a smoker [Chronic Anticoagulation] : no chronic anticoagulation [Chronic Kidney Disease] : no chronic kidney disease [FreeTextEntry1] : lesions removed from nose [FreeTextEntry2] : 09/29/2021 [FreeTextEntry3] : DR Carrizales

## 2021-09-26 ENCOUNTER — APPOINTMENT (OUTPATIENT)
Dept: DISASTER EMERGENCY | Facility: CLINIC | Age: 71
End: 2021-09-26

## 2021-09-26 ENCOUNTER — LABORATORY RESULT (OUTPATIENT)
Age: 71
End: 2021-09-26

## 2021-09-28 ENCOUNTER — TRANSCRIPTION ENCOUNTER (OUTPATIENT)
Age: 71
End: 2021-09-28

## 2021-09-29 ENCOUNTER — OUTPATIENT (OUTPATIENT)
Dept: OUTPATIENT SERVICES | Facility: HOSPITAL | Age: 71
LOS: 1 days | End: 2021-09-29
Payer: MEDICARE

## 2021-09-29 ENCOUNTER — RESULT REVIEW (OUTPATIENT)
Age: 71
End: 2021-09-29

## 2021-09-29 VITALS
HEIGHT: 68 IN | TEMPERATURE: 98 F | SYSTOLIC BLOOD PRESSURE: 150 MMHG | HEART RATE: 61 BPM | OXYGEN SATURATION: 94 % | WEIGHT: 257.06 LBS | DIASTOLIC BLOOD PRESSURE: 89 MMHG | RESPIRATION RATE: 14 BRPM

## 2021-09-29 VITALS
DIASTOLIC BLOOD PRESSURE: 56 MMHG | HEART RATE: 61 BPM | OXYGEN SATURATION: 100 % | SYSTOLIC BLOOD PRESSURE: 121 MMHG | RESPIRATION RATE: 13 BRPM

## 2021-09-29 DIAGNOSIS — J34.89 OTHER SPECIFIED DISORDERS OF NOSE AND NASAL SINUSES: ICD-10-CM

## 2021-09-29 DIAGNOSIS — Z01.818 ENCOUNTER FOR OTHER PREPROCEDURAL EXAMINATION: ICD-10-CM

## 2021-09-29 DIAGNOSIS — L71.1 RHINOPHYMA: ICD-10-CM

## 2021-09-29 DIAGNOSIS — Z90.49 ACQUIRED ABSENCE OF OTHER SPECIFIED PARTS OF DIGESTIVE TRACT: Chronic | ICD-10-CM

## 2021-09-29 DIAGNOSIS — Z98.890 OTHER SPECIFIED POSTPROCEDURAL STATES: Chronic | ICD-10-CM

## 2021-09-29 PROCEDURE — C1889: CPT

## 2021-09-29 PROCEDURE — 88304 TISSUE EXAM BY PATHOLOGIST: CPT

## 2021-09-29 PROCEDURE — 30120 REVISION OF NOSE: CPT

## 2021-09-29 PROCEDURE — 82962 GLUCOSE BLOOD TEST: CPT

## 2021-09-29 PROCEDURE — 88304 TISSUE EXAM BY PATHOLOGIST: CPT | Mod: 26

## 2021-09-29 PROCEDURE — 30465 REPAIR NASAL STENOSIS: CPT

## 2021-09-29 RX ORDER — ACETAMINOPHEN WITH CODEINE 300MG-30MG
1 TABLET ORAL EVERY 4 HOURS
Refills: 0 | Status: DISCONTINUED | OUTPATIENT
Start: 2021-09-29 | End: 2021-09-29

## 2021-09-29 RX ORDER — MUPIROCIN 20 MG/G
1 OINTMENT TOPICAL
Qty: 0 | Refills: 0 | DISCHARGE
Start: 2021-09-29

## 2021-09-29 RX ORDER — ATORVASTATIN CALCIUM 80 MG/1
1 TABLET, FILM COATED ORAL
Qty: 0 | Refills: 0 | DISCHARGE
Start: 2021-09-29

## 2021-09-29 RX ORDER — TIOTROPIUM BROMIDE 18 UG/1
1 CAPSULE ORAL; RESPIRATORY (INHALATION) DAILY
Refills: 0 | Status: DISCONTINUED | OUTPATIENT
Start: 2021-09-29 | End: 2021-10-13

## 2021-09-29 RX ORDER — OXYCODONE HYDROCHLORIDE 5 MG/1
5 TABLET ORAL ONCE
Refills: 0 | Status: DISCONTINUED | OUTPATIENT
Start: 2021-09-29 | End: 2021-09-29

## 2021-09-29 RX ORDER — ATORVASTATIN CALCIUM 80 MG/1
40 TABLET, FILM COATED ORAL AT BEDTIME
Refills: 0 | Status: DISCONTINUED | OUTPATIENT
Start: 2021-09-29 | End: 2021-10-13

## 2021-09-29 RX ORDER — METFORMIN HYDROCHLORIDE 850 MG/1
500 TABLET ORAL
Refills: 0 | Status: DISCONTINUED | OUTPATIENT
Start: 2021-09-29 | End: 2021-10-13

## 2021-09-29 RX ORDER — SODIUM CHLORIDE 9 MG/ML
1000 INJECTION, SOLUTION INTRAVENOUS
Refills: 0 | Status: DISCONTINUED | OUTPATIENT
Start: 2021-09-29 | End: 2021-09-29

## 2021-09-29 RX ORDER — ATORVASTATIN CALCIUM 80 MG/1
40 TABLET, FILM COATED ORAL DAILY
Refills: 0 | Status: DISCONTINUED | OUTPATIENT
Start: 2021-09-29 | End: 2021-09-29

## 2021-09-29 RX ORDER — KETOROLAC TROMETHAMINE 30 MG/ML
10 SYRINGE (ML) INJECTION
Refills: 0 | Status: COMPLETED | OUTPATIENT
Start: 2021-09-29 | End: 2021-10-04

## 2021-09-29 RX ORDER — ONDANSETRON 8 MG/1
4 TABLET, FILM COATED ORAL ONCE
Refills: 0 | Status: DISCONTINUED | OUTPATIENT
Start: 2021-09-29 | End: 2021-09-29

## 2021-09-29 RX ORDER — ATORVASTATIN CALCIUM 80 MG/1
1 TABLET, FILM COATED ORAL
Qty: 0 | Refills: 0 | DISCHARGE

## 2021-09-29 RX ORDER — ACETAMINOPHEN WITH CODEINE 300MG-30MG
1 TABLET ORAL
Qty: 0 | Refills: 0 | DISCHARGE
Start: 2021-09-29

## 2021-09-29 RX ORDER — ACETAMINOPHEN WITH CODEINE 300MG-30MG
2 TABLET ORAL
Qty: 0 | Refills: 0 | DISCHARGE
Start: 2021-09-29

## 2021-09-29 RX ORDER — ACETAMINOPHEN WITH CODEINE 300MG-30MG
2 TABLET ORAL EVERY 4 HOURS
Refills: 0 | Status: DISCONTINUED | OUTPATIENT
Start: 2021-09-29 | End: 2021-09-29

## 2021-09-29 RX ORDER — ACETAMINOPHEN 500 MG
325 TABLET ORAL EVERY 4 HOURS
Refills: 0 | Status: DISCONTINUED | OUTPATIENT
Start: 2021-09-29 | End: 2021-10-13

## 2021-09-29 RX ORDER — ONDANSETRON 8 MG/1
4 TABLET, FILM COATED ORAL EVERY 6 HOURS
Refills: 0 | Status: DISCONTINUED | OUTPATIENT
Start: 2021-09-29 | End: 2021-10-13

## 2021-09-29 RX ORDER — HYDROMORPHONE HYDROCHLORIDE 2 MG/ML
0.5 INJECTION INTRAMUSCULAR; INTRAVENOUS; SUBCUTANEOUS
Refills: 0 | Status: DISCONTINUED | OUTPATIENT
Start: 2021-09-29 | End: 2021-09-29

## 2021-09-29 RX ORDER — MUPIROCIN 20 MG/G
1 OINTMENT TOPICAL ONCE
Refills: 0 | Status: COMPLETED | OUTPATIENT
Start: 2021-09-29 | End: 2021-09-29

## 2021-09-29 RX ADMIN — MUPIROCIN 1 APPLICATION(S): 20 OINTMENT TOPICAL at 13:51

## 2021-09-29 RX ADMIN — SODIUM CHLORIDE 75 MILLILITER(S): 9 INJECTION, SOLUTION INTRAVENOUS at 13:12

## 2021-09-29 NOTE — BRIEF OPERATIVE NOTE - NSICDXBRIEFPROCEDURE_GEN_ALL_CORE_FT
PROCEDURES:  Excision, skin, nose, for rhinophyma 29-Sep-2021 12:59:50  Tyrell Lynch  Repair nasal vestibular stenosis 29-Sep-2021 13:00:05  Tyrell Lynch

## 2021-09-29 NOTE — ASU DISCHARGE PLAN (ADULT/PEDIATRIC) - ASU DC SPECIAL INSTRUCTIONSFT
Apply bactroban ointment to your nose liberally every 4 hours Apply bactroban ointment to your nose liberally every 4 hours, don't scratch or touch treated area

## 2021-09-29 NOTE — BRIEF OPERATIVE NOTE - NSICDXBRIEFPOSTOP_GEN_ALL_CORE_FT
POST-OP DIAGNOSIS:  Rhinophyma 29-Sep-2021 13:00:55  Tyrell Lnych  Nasal valve stenosis 29-Sep-2021 13:01:09  Tyrell Lynch

## 2021-09-29 NOTE — BRIEF OPERATIVE NOTE - NSICDXBRIEFPREOP_GEN_ALL_CORE_FT
PRE-OP DIAGNOSIS:  Rhinophyma 29-Sep-2021 13:00:17  Tyrell Lynch  Nasal valve stenosis 29-Sep-2021 13:00:37  Tyrell Lynch

## 2021-10-05 ENCOUNTER — MED ADMIN CHARGE (OUTPATIENT)
Age: 71
End: 2021-10-05

## 2021-10-05 ENCOUNTER — APPOINTMENT (OUTPATIENT)
Dept: FAMILY MEDICINE | Facility: CLINIC | Age: 71
End: 2021-10-05
Payer: MEDICARE

## 2021-10-05 PROBLEM — G47.33 OBSTRUCTIVE SLEEP APNEA (ADULT) (PEDIATRIC): Chronic | Status: ACTIVE | Noted: 2021-09-17

## 2021-10-05 PROBLEM — E11.9 TYPE 2 DIABETES MELLITUS WITHOUT COMPLICATIONS: Chronic | Status: ACTIVE | Noted: 2021-09-17

## 2021-10-05 PROBLEM — E66.9 OBESITY, UNSPECIFIED: Chronic | Status: ACTIVE | Noted: 2021-09-17

## 2021-10-05 PROBLEM — E78.00 PURE HYPERCHOLESTEROLEMIA, UNSPECIFIED: Chronic | Status: ACTIVE | Noted: 2021-09-17

## 2021-10-05 PROCEDURE — G0008: CPT

## 2021-10-05 PROCEDURE — 90662 IIV NO PRSV INCREASED AG IM: CPT

## 2021-10-21 ENCOUNTER — OUTPATIENT (OUTPATIENT)
Dept: OUTPATIENT SERVICES | Facility: HOSPITAL | Age: 71
LOS: 1 days | End: 2021-10-21

## 2021-10-21 ENCOUNTER — APPOINTMENT (OUTPATIENT)
Dept: MRI IMAGING | Facility: CLINIC | Age: 71
End: 2021-10-21
Payer: MEDICARE

## 2021-10-21 ENCOUNTER — APPOINTMENT (OUTPATIENT)
Dept: CT IMAGING | Facility: CLINIC | Age: 71
End: 2021-10-21
Payer: MEDICARE

## 2021-10-21 DIAGNOSIS — Z98.890 OTHER SPECIFIED POSTPROCEDURAL STATES: Chronic | ICD-10-CM

## 2021-10-21 DIAGNOSIS — J84.9 INTERSTITIAL PULMONARY DISEASE, UNSPECIFIED: ICD-10-CM

## 2021-10-21 DIAGNOSIS — Z90.49 ACQUIRED ABSENCE OF OTHER SPECIFIED PARTS OF DIGESTIVE TRACT: Chronic | ICD-10-CM

## 2021-10-21 PROCEDURE — 71250 CT THORAX DX C-: CPT | Mod: 26

## 2021-10-21 PROCEDURE — 73721 MRI JNT OF LWR EXTRE W/O DYE: CPT | Mod: 26,RT

## 2021-10-25 ENCOUNTER — NON-APPOINTMENT (OUTPATIENT)
Age: 71
End: 2021-10-25

## 2021-11-02 DIAGNOSIS — Z01.818 ENCOUNTER FOR OTHER PREPROCEDURAL EXAMINATION: ICD-10-CM

## 2021-11-04 ENCOUNTER — APPOINTMENT (OUTPATIENT)
Dept: DISASTER EMERGENCY | Facility: CLINIC | Age: 71
End: 2021-11-04

## 2021-11-05 LAB — SARS-COV-2 N GENE NPH QL NAA+PROBE: NOT DETECTED

## 2021-11-08 ENCOUNTER — APPOINTMENT (OUTPATIENT)
Dept: PULMONOLOGY | Facility: CLINIC | Age: 71
End: 2021-11-08
Payer: MEDICARE

## 2021-11-08 VITALS — DIASTOLIC BLOOD PRESSURE: 70 MMHG | HEART RATE: 68 BPM | SYSTOLIC BLOOD PRESSURE: 147 MMHG | OXYGEN SATURATION: 95 %

## 2021-11-08 VITALS — WEIGHT: 256 LBS | BODY MASS INDEX: 39.71 KG/M2 | HEIGHT: 67.5 IN

## 2021-11-08 PROCEDURE — 94727 GAS DIL/WSHOT DETER LNG VOL: CPT

## 2021-11-08 PROCEDURE — 94010 BREATHING CAPACITY TEST: CPT

## 2021-11-08 PROCEDURE — 85018 HEMOGLOBIN: CPT | Mod: QW

## 2021-11-08 PROCEDURE — 99214 OFFICE O/P EST MOD 30 MIN: CPT | Mod: 25

## 2021-11-08 PROCEDURE — 94729 DIFFUSING CAPACITY: CPT

## 2021-11-08 NOTE — PROCEDURE
[FreeTextEntry1] : CT 10/21: stable reticular abn, peripheral and subpleural, traction bronchiectasis- prob IPF\par \par PFts mild-mod air flow obstruction, normal TLC, moderate decrease in DLCO which corrects , no sig change from 2020\par

## 2021-11-08 NOTE — HISTORY OF PRESENT ILLNESS
[TextBox_4] : at baseline  no sig sputum\par  no sig change in status\par  chronic exertional  dyspnea, no change\par  using spiriva  respimat\par had sleep study, mod NJ ( AHI 20) non compliant with  oral appliance\par     25 pack yrs smoker, quit 25 yrs ago [facility] [place] gp\par had covid booster, Pfizer [FreeTextEntry1] : \par

## 2021-11-08 NOTE — DISCUSSION/SUMMARY
[FreeTextEntry1] : COPD Gold class II complicated by interstitial lung disease component /clinical UIP\par connective tissue serologies are negative, was tolerating pirfenidone but denies benefit and stopped, saw second opinion who felt more asbestosis related, pt getting legal assistance for that \par My review of pulmonary ILD with areas of ossification were less clear and included UIP, NSIP, amyloid\par  also focus on anti reflux therapy given ILD, denies any GERD, offered tertiary care center, he does not want another opinion at this time or bx, does not wish to try another anti fibrotic\par repeat CT 10/21  stable, PFts stable, mild-mod obstruction, normal TLC, no change FVC or DLCO\par clinically at baseline\par continue spiriva for COPD component\par obesity with moderate  obstructive sleep apnea - ( AHI 20) intolerant to CPAP in past, saw Sleep MD for oral appliance, compliance discussed, he is not uisng\par weight loss\par had Covid vaccine and booster\par 6 months or sooner if needed

## 2021-11-08 NOTE — CONSULT LETTER
[Dear  ___] : Dear  [unfilled], [Consult Letter:] : I had the pleasure of evaluating your patient, [unfilled]. [Please see my note below.] : Please see my note below. [Consult Closing:] : Thank you very much for allowing me to participate in the care of this patient.  If you have any questions, please do not hesitate to contact me. [Sincerely,] : Sincerely, [DrIsaias  ___] : Dr. CHAPA [[facility]ede Wilkinson DO, [facility]] : Fede Wilkinson DO, [facility] [Director, Respiratory Care] : Director, Respiratory Care [[facility]] : [facility] [FreeTextEntry3] : Fed[facility] Wilkinson DO [facility]\par Pulmonary Critical Care\par Director Pulmonary Division\par Medical Director Respiratory Therapy\par [facility]\par \par

## 2021-11-12 ENCOUNTER — RX RENEWAL (OUTPATIENT)
Age: 71
End: 2021-11-12

## 2022-04-14 NOTE — H&P PST ADULT - VENOUS THROMBOEMBOLISM AGE
Admitted on 4/13 for C4 of EWALL consolidation (IDAC). Tolerated cytarabine well yesterday. This AM, pt reported blurred vision and R sided weakness which was new,  also noted some confusion, though at bedside pt A&Ox4, though with notable R sided weakness and endorsing R sided headache.  reports these episodes have happened at home, pt follows with neurology and this is reported to be a seizure, however he reports the patient has never had right sided weakness, instead it is typically left sided. CT Stroke called and fortunately CT head negative. MRI not obtained d/t pacemaker. Symptoms resolved soon after pt returned to floor. Some concern for polypharmacy as pt's  reports scheduled meds at home vary from how meds given here (see pharmacy note). We have adjusted timing of medications. Not pursuing EEG at this time and doubtful this is cytarabine toxicity given quick resolution and known history of similar episodes. Otherwise, pt is doing well upon return to floor. AF/VSS.   4/18/2022 C4 of SHAYAN, D6. She will complete chemotherapy this evening. Tolerating chemotherapy well so far. No further neurological symptoms.   04/19/2022: C4D7 (IDAC) of SHAYAN. Continues to be afebrile. VSS. No neuro symptoms. Transfusing 1 unit prbc for hgb of 7.0. 2 greenish spots noted to tongue. Suspect fungal starting oral nystatin. Will discharge on antifungal ppx per protocol. Will discharge home today upon completion of chemo. Will set up twice weekly labs with Dr. Garcia.    (2) 61-74 years

## 2022-05-11 ENCOUNTER — APPOINTMENT (OUTPATIENT)
Dept: PULMONOLOGY | Facility: CLINIC | Age: 72
End: 2022-05-11
Payer: MEDICARE

## 2022-05-11 VITALS — OXYGEN SATURATION: 95 % | HEART RATE: 65 BPM | RESPIRATION RATE: 16 BRPM

## 2022-05-11 VITALS — BODY MASS INDEX: 38.42 KG/M2 | WEIGHT: 249 LBS | DIASTOLIC BLOOD PRESSURE: 70 MMHG | SYSTOLIC BLOOD PRESSURE: 115 MMHG

## 2022-05-11 DIAGNOSIS — J61 PNEUMOCONIOSIS DUE TO ASBESTOS AND OTHER MINERAL FIBERS: ICD-10-CM

## 2022-05-11 PROCEDURE — 99214 OFFICE O/P EST MOD 30 MIN: CPT

## 2022-05-11 RX ORDER — TIOTROPIUM BROMIDE INHALATION SPRAY 3.12 UG/1
2.5 SPRAY, METERED RESPIRATORY (INHALATION) DAILY
Qty: 1 | Refills: 5 | Status: DISCONTINUED | COMMUNITY
Start: 2021-05-17 | End: 2022-05-11

## 2022-05-11 NOTE — HISTORY OF PRESENT ILLNESS
[TextBox_4] : at baseline  no sig sputum\par  no sig change in status\par  chronic exertional  dyspnea, no change\par  using spiriva  respimat\par had sleep study, mod NJ ( AHI 20) non compliant with  oral appliance\par     25 pack yrs smoker, quit 25 yrs ago [facility] [place] gp\par had covid booster, Pfizer, vax x 4 [FreeTextEntry1] : \par

## 2022-05-11 NOTE — CONSULT LETTER
[Dear  ___] : Dear  [unfilled], [Consult Letter:] : I had the pleasure of evaluating your patient, [unfilled]. [Please see my note below.] : Please see my note below. [Consult Closing:] : Thank you very much for allowing me to participate in the care of this patient.  If you have any questions, please do not hesitate to contact me. [Sincerely,] : Sincerely, [DrIsaias  ___] : Dr. CHAPA [[facility]ede [facility], [facility]] : Fede Wilkinson DO, [facility] [Director, Respiratory Care] : Director, Respiratory Care [[facility]] : [facility] [FreeTextEntry3] : Feed [facility]kinson DO [facility]\par Pulmonary Critical Care\par Director Pulmonary Division\par Medical Director Respiratory Therapy\par [facility]\par \par

## 2022-05-11 NOTE — DISCUSSION/SUMMARY
[FreeTextEntry1] : COPD Gold class II complicated by interstitial lung disease component /clinical UIP\par connective tissue serologies are negative, was tolerating pirfenidone but denies benefit and stopped, saw second opinion who felt more asbestosis related, pt getting legal assistance for that \par My review of pulmonary ILD with areas of ossification were less clear and included UIP, NSIP, amyloid\par  also focus on anti reflux therapy given ILD, denies any GERD, offered tertiary care center, he does not want another opinion at this time or bx, does not wish to try another anti fibrotic\par repeat CT 10/21  stable, PFts stable, mild-mod obstruction, normal TLC, no change FVC or DLCO\par clinically at baseline\par continue spiriva for COPD component\par obesity with moderate  obstructive sleep apnea - ( AHI 20) intolerant to CPAP in past, saw Sleep MD for oral appliance, compliance discussed, he is not uisng\par weight loss\par had Covid vaccine and booster x 2\par lung cancer screening scan 10/21 ordered\par 6 months or sooner if needed

## 2022-07-21 ENCOUNTER — RX RENEWAL (OUTPATIENT)
Age: 72
End: 2022-07-21

## 2022-09-06 ENCOUNTER — APPOINTMENT (OUTPATIENT)
Dept: FAMILY MEDICINE | Facility: CLINIC | Age: 72
End: 2022-09-06

## 2022-09-06 VITALS
TEMPERATURE: 97.9 F | WEIGHT: 252 LBS | SYSTOLIC BLOOD PRESSURE: 119 MMHG | HEART RATE: 86 BPM | OXYGEN SATURATION: 95 % | DIASTOLIC BLOOD PRESSURE: 76 MMHG | BODY MASS INDEX: 39.55 KG/M2 | HEIGHT: 67 IN

## 2022-09-06 DIAGNOSIS — N52.9 MALE ERECTILE DYSFUNCTION, UNSPECIFIED: ICD-10-CM

## 2022-09-06 PROCEDURE — 99213 OFFICE O/P EST LOW 20 MIN: CPT

## 2022-09-06 NOTE — PLAN
[FreeTextEntry1] : 72-year-old male for follow-up.\par \par Diabetes/hyperlipidemia/erectile dysfunction.  Stable.  Meds renewed.\par \par All questions answered.  Patient voiced understanding and agreement above plan.  Return to clinic as recommended.

## 2022-09-06 NOTE — HISTORY OF PRESENT ILLNESS
[FreeTextEntry1] : pt. here for diabetes check for refill [de-identified] : 72-year-old male for follow-up.\par \par Diabetes/hyperlipidemia/erectile dysfunction.  Stable.  Patient requesting refill on medications.

## 2022-09-06 NOTE — PHYSICAL EXAM
[No Acute Distress] : no acute distress [Well Nourished] : well nourished [Well Developed] : well developed [Well-Appearing] : well-appearing [Normal Voice/Communication] : normal voice/communication [Normal Sclera/Conjunctiva] : normal sclera/conjunctiva [No Respiratory Distress] : no respiratory distress  [Clear to Auscultation] : lungs were clear to auscultation bilaterally [Normal Rate] : normal rate  [Normal S1, S2] : normal S1 and S2 [Soft] : abdomen soft [Speech Grossly Normal] : speech grossly normal [Normal Affect] : the affect was normal [Normal Mood] : the mood was normal

## 2022-11-04 ENCOUNTER — APPOINTMENT (OUTPATIENT)
Dept: CT IMAGING | Facility: CLINIC | Age: 72
End: 2022-11-04

## 2022-11-04 ENCOUNTER — OUTPATIENT (OUTPATIENT)
Dept: OUTPATIENT SERVICES | Facility: HOSPITAL | Age: 72
LOS: 1 days | End: 2022-11-04

## 2022-11-04 DIAGNOSIS — Z90.49 ACQUIRED ABSENCE OF OTHER SPECIFIED PARTS OF DIGESTIVE TRACT: Chronic | ICD-10-CM

## 2022-11-04 DIAGNOSIS — J84.9 INTERSTITIAL PULMONARY DISEASE, UNSPECIFIED: ICD-10-CM

## 2022-11-04 DIAGNOSIS — Z98.890 OTHER SPECIFIED POSTPROCEDURAL STATES: Chronic | ICD-10-CM

## 2022-11-04 PROCEDURE — 71250 CT THORAX DX C-: CPT | Mod: 26

## 2022-11-14 ENCOUNTER — APPOINTMENT (OUTPATIENT)
Dept: FAMILY MEDICINE | Facility: CLINIC | Age: 72
End: 2022-11-14

## 2022-11-14 VITALS
OXYGEN SATURATION: 95 % | TEMPERATURE: 97.3 F | DIASTOLIC BLOOD PRESSURE: 80 MMHG | WEIGHT: 253 LBS | SYSTOLIC BLOOD PRESSURE: 126 MMHG | HEIGHT: 67 IN | BODY MASS INDEX: 39.71 KG/M2 | HEART RATE: 72 BPM | RESPIRATION RATE: 14 BRPM

## 2022-11-14 PROCEDURE — 36415 COLL VENOUS BLD VENIPUNCTURE: CPT | Mod: 59

## 2022-11-14 PROCEDURE — 99213 OFFICE O/P EST LOW 20 MIN: CPT | Mod: 25

## 2022-11-14 NOTE — HISTORY OF PRESENT ILLNESS
[FreeTextEntry1] : Patient is here for diabetes check  [de-identified] : 71 yo M for f/u DM. Compliant w/ therapy. Does not check sugars at home. Doing well. No particular questions or concerns today.

## 2022-11-14 NOTE — PLAN
[FreeTextEntry1] : 71 yo M for f/u DM. A1c in April 2021 noted to be 7.2. Will have pt continue current dose of metformin for now and rpt A1c today. Pending results, he is aware dosing is subject to change.\par \par HLD. Cont Statin. Lipid panel/CMP drawn today. Follows w/ cardio. \par \par COPD. On spiriva. Keep f/u w/ pulm. \par \par Health Maintenance. Pt states he has already received this season's flu shot and most updated COVID booster. \par \par All questions answered. Pt voiced understanding and in agreement to plan. RTC as recommended.

## 2022-11-16 ENCOUNTER — NON-APPOINTMENT (OUTPATIENT)
Age: 72
End: 2022-11-16

## 2022-11-16 LAB
ALBUMIN SERPL ELPH-MCNC: 4.4 G/DL
ALP BLD-CCNC: 112 U/L
ALT SERPL-CCNC: 17 U/L
ANION GAP SERPL CALC-SCNC: 19 MMOL/L
AST SERPL-CCNC: 16 U/L
BASOPHILS # BLD AUTO: 0.09 K/UL
BASOPHILS NFR BLD AUTO: 0.7 %
BILIRUB SERPL-MCNC: 0.7 MG/DL
BUN SERPL-MCNC: 20 MG/DL
CALCIUM SERPL-MCNC: 9.9 MG/DL
CHLORIDE SERPL-SCNC: 98 MMOL/L
CHOLEST SERPL-MCNC: 139 MG/DL
CO2 SERPL-SCNC: 25 MMOL/L
CREAT SERPL-MCNC: 1.52 MG/DL
EGFR: 48 ML/MIN/1.73M2
EOSINOPHIL # BLD AUTO: 0.32 K/UL
EOSINOPHIL NFR BLD AUTO: 2.4 %
ESTIMATED AVERAGE GLUCOSE: 166 MG/DL
GLUCOSE SERPL-MCNC: 135 MG/DL
HBA1C MFR BLD HPLC: 7.4 %
HCT VFR BLD CALC: 47.1 %
HDLC SERPL-MCNC: 38 MG/DL
HGB BLD-MCNC: 14.5 G/DL
IMM GRANULOCYTES NFR BLD AUTO: 0.7 %
LDLC SERPL CALC-MCNC: 73 MG/DL
LYMPHOCYTES # BLD AUTO: 2.79 K/UL
LYMPHOCYTES NFR BLD AUTO: 20.8 %
MAN DIFF?: NORMAL
MCHC RBC-ENTMCNC: 30.2 PG
MCHC RBC-ENTMCNC: 30.8 GM/DL
MCV RBC AUTO: 98.1 FL
MONOCYTES # BLD AUTO: 0.8 K/UL
MONOCYTES NFR BLD AUTO: 6 %
NEUTROPHILS # BLD AUTO: 9.32 K/UL
NEUTROPHILS NFR BLD AUTO: 69.4 %
NONHDLC SERPL-MCNC: 101 MG/DL
PLATELET # BLD AUTO: 268 K/UL
POTASSIUM SERPL-SCNC: 4.9 MMOL/L
PROT SERPL-MCNC: 7.2 G/DL
PSA SERPL-MCNC: 3.39 NG/ML
RBC # BLD: 4.8 M/UL
RBC # FLD: 13.9 %
SODIUM SERPL-SCNC: 143 MMOL/L
TRIGL SERPL-MCNC: 138 MG/DL
TSH SERPL-ACNC: 3.22 UIU/ML
WBC # FLD AUTO: 13.41 K/UL

## 2022-11-18 ENCOUNTER — NON-APPOINTMENT (OUTPATIENT)
Age: 72
End: 2022-11-18

## 2022-11-18 ENCOUNTER — RESULT REVIEW (OUTPATIENT)
Age: 72
End: 2022-11-18

## 2022-11-18 DIAGNOSIS — R92.2 INCONCLUSIVE MAMMOGRAM: ICD-10-CM

## 2022-11-25 ENCOUNTER — APPOINTMENT (OUTPATIENT)
Dept: MAMMOGRAPHY | Facility: CLINIC | Age: 72
End: 2022-11-25

## 2022-11-25 ENCOUNTER — RESULT REVIEW (OUTPATIENT)
Age: 72
End: 2022-11-25

## 2022-11-25 ENCOUNTER — APPOINTMENT (OUTPATIENT)
Dept: ULTRASOUND IMAGING | Facility: CLINIC | Age: 72
End: 2022-11-25

## 2022-11-25 ENCOUNTER — OUTPATIENT (OUTPATIENT)
Dept: OUTPATIENT SERVICES | Facility: HOSPITAL | Age: 72
LOS: 1 days | End: 2022-11-25
Payer: MEDICARE

## 2022-11-25 DIAGNOSIS — R92.2 INCONCLUSIVE MAMMOGRAM: ICD-10-CM

## 2022-11-25 DIAGNOSIS — Z98.890 OTHER SPECIFIED POSTPROCEDURAL STATES: Chronic | ICD-10-CM

## 2022-11-25 DIAGNOSIS — Z90.49 ACQUIRED ABSENCE OF OTHER SPECIFIED PARTS OF DIGESTIVE TRACT: Chronic | ICD-10-CM

## 2022-11-25 PROCEDURE — 77066 DX MAMMO INCL CAD BI: CPT | Mod: 26

## 2022-11-25 PROCEDURE — 76642 ULTRASOUND BREAST LIMITED: CPT | Mod: 26,LT

## 2022-11-25 PROCEDURE — G0279: CPT | Mod: 26

## 2022-11-25 PROCEDURE — G0279: CPT

## 2022-11-25 PROCEDURE — 76642 ULTRASOUND BREAST LIMITED: CPT

## 2022-11-25 PROCEDURE — 77066 DX MAMMO INCL CAD BI: CPT

## 2022-12-01 ENCOUNTER — NON-APPOINTMENT (OUTPATIENT)
Age: 72
End: 2022-12-01

## 2022-12-09 ENCOUNTER — APPOINTMENT (OUTPATIENT)
Dept: PULMONOLOGY | Facility: CLINIC | Age: 72
End: 2022-12-09

## 2022-12-09 ENCOUNTER — NON-APPOINTMENT (OUTPATIENT)
Age: 72
End: 2022-12-09

## 2022-12-09 VITALS
BODY MASS INDEX: 39.78 KG/M2 | DIASTOLIC BLOOD PRESSURE: 70 MMHG | OXYGEN SATURATION: 96 % | HEART RATE: 71 BPM | WEIGHT: 254 LBS | SYSTOLIC BLOOD PRESSURE: 126 MMHG | RESPIRATION RATE: 14 BRPM

## 2022-12-09 PROCEDURE — 99214 OFFICE O/P EST MOD 30 MIN: CPT

## 2022-12-09 RX ORDER — CEPHALEXIN 250 MG/1
250 CAPSULE ORAL
Qty: 21 | Refills: 0 | Status: DISCONTINUED | COMMUNITY
Start: 2022-09-03

## 2022-12-09 NOTE — PROCEDURE
[FreeTextEntry1] : CT 11/22 stable reticular abn, peripheral and subpleural, traction bronchiectasis- prob IPF\par \par PFts mild-mod air flow obstruction, normal TLC, moderate decrease in DLCO which corrects , no sig change from 2020\par

## 2022-12-09 NOTE — CONSULT LETTER
[Dear  ___] : Dear  [unfilled], [Consult Letter:] : I had the pleasure of evaluating your patient, [unfilled]. [Please see my note below.] : Please see my note below. [Consult Closing:] : Thank you very much for allowing me to participate in the care of this patient.  If you have any questions, please do not hesitate to contact me. [Sincerely,] : Sincerely, [DrIsaias  ___] : Dr. CHAPA [[facility], [facility]] : Fede Wilkinson [facility], [facility] [Director, Respiratory Care] : Director, Respiratory Care [[facility]] : [facility] [FreeTextEntry3] : Fed[facility] DO [facility]\par Pulmonary Critical Care\par Director Pulmonary Division\par Medical Director Respiratory Therapy\par [facility]\par \par

## 2022-12-09 NOTE — DISCUSSION/SUMMARY
[FreeTextEntry1] : COPD Gold class II complicated by interstitial lung disease component /clinical UIP\par connective tissue serologies are negative, was tolerating pirfenidone but denies benefit and stopped, saw second opinion who felt more asbestosis related, pt getting legal assistance for that \par My review of pulmonary ILD with areas of ossification were less clear and included favor UIP\par  also focus on anti reflux therapy given ILD, denies any GERD, offered tertiary care center, he does not want another opinion at this time or bx, does not wish to try another anti fibrotic\par repeat CT 11/22  stable, PFts stable, mild-mod obstruction, normal TLC, no change FVC or DLCO\par clinically at baseline- repeat at follow up\par Mammogram and breast sono negative for breast nodule seen on CT, defer follow up medical team\par continue spiriva for COPD component\par obesity with moderate  obstructive sleep apnea - ( AHI 20) intolerant to CPAP in past, saw Sleep MD for oral appliance, compliance discussed, he is not using\par weight loss\par had Covid vaccine and booster x 4, had flu vax\par 5 months or sooner if needed with PFTs

## 2022-12-09 NOTE — HISTORY OF PRESENT ILLNESS
[TextBox_4] : at baseline  no sig sputum\par  no sig change in status\par  chronic exertional  dyspnea, no change\par  using spiriva  respimat\par had sleep study, mod NJ ( AHI 20) non compliant with  oral appliance\par     25 pack yrs smoker, quit 25 yrs ago [facility] [place] gp\par had covid booster, Pfizer, vax x 4\par \par \par 12/9/22\par doing well\par no acute pulmonary complaints\par had recent covid  booster and Flu\par Oral appliance - not wearing\par Spiriva daily\par no fever, chill, chest pain\par dyspnea at baseline\par  [FreeTextEntry1] : \par

## 2023-01-24 ENCOUNTER — EMERGENCY (EMERGENCY)
Facility: HOSPITAL | Age: 73
LOS: 1 days | Discharge: HOME | End: 2023-01-24
Attending: EMERGENCY MEDICINE
Payer: MEDICARE

## 2023-01-24 VITALS
TEMPERATURE: 98 F | OXYGEN SATURATION: 95 % | HEART RATE: 67 BPM | SYSTOLIC BLOOD PRESSURE: 132 MMHG | RESPIRATION RATE: 19 BRPM | DIASTOLIC BLOOD PRESSURE: 71 MMHG

## 2023-01-24 VITALS
SYSTOLIC BLOOD PRESSURE: 138 MMHG | OXYGEN SATURATION: 95 % | WEIGHT: 250 LBS | HEART RATE: 84 BPM | TEMPERATURE: 98 F | DIASTOLIC BLOOD PRESSURE: 68 MMHG | RESPIRATION RATE: 20 BRPM

## 2023-01-24 DIAGNOSIS — Z90.49 ACQUIRED ABSENCE OF OTHER SPECIFIED PARTS OF DIGESTIVE TRACT: Chronic | ICD-10-CM

## 2023-01-24 DIAGNOSIS — Z98.890 OTHER SPECIFIED POSTPROCEDURAL STATES: Chronic | ICD-10-CM

## 2023-01-24 LAB
ALBUMIN SERPL ELPH-MCNC: 4 G/DL — SIGNIFICANT CHANGE UP (ref 3.3–5.2)
ALP SERPL-CCNC: 103 U/L — SIGNIFICANT CHANGE UP (ref 40–120)
ALT FLD-CCNC: 12 U/L — SIGNIFICANT CHANGE UP
ANION GAP SERPL CALC-SCNC: 10 MMOL/L — SIGNIFICANT CHANGE UP (ref 5–17)
APPEARANCE UR: CLEAR — SIGNIFICANT CHANGE UP
APTT BLD: 29.4 SEC — SIGNIFICANT CHANGE UP (ref 27.5–35.5)
AST SERPL-CCNC: 11 U/L — SIGNIFICANT CHANGE UP
BACTERIA # UR AUTO: ABNORMAL
BASOPHILS # BLD AUTO: 0.06 K/UL — SIGNIFICANT CHANGE UP (ref 0–0.2)
BASOPHILS NFR BLD AUTO: 0.4 % — SIGNIFICANT CHANGE UP (ref 0–2)
BILIRUB SERPL-MCNC: 0.7 MG/DL — SIGNIFICANT CHANGE UP (ref 0.4–2)
BILIRUB UR-MCNC: NEGATIVE — SIGNIFICANT CHANGE UP
BUN SERPL-MCNC: 16.6 MG/DL — SIGNIFICANT CHANGE UP (ref 8–20)
CALCIUM SERPL-MCNC: 9.4 MG/DL — SIGNIFICANT CHANGE UP (ref 8.4–10.5)
CHLORIDE SERPL-SCNC: 106 MMOL/L — SIGNIFICANT CHANGE UP (ref 96–108)
CO2 SERPL-SCNC: 26 MMOL/L — SIGNIFICANT CHANGE UP (ref 22–29)
COLOR SPEC: YELLOW — SIGNIFICANT CHANGE UP
CREAT SERPL-MCNC: 1.38 MG/DL — HIGH (ref 0.5–1.3)
DIFF PNL FLD: ABNORMAL
EGFR: 54 ML/MIN/1.73M2 — LOW
EOSINOPHIL # BLD AUTO: 0.21 K/UL — SIGNIFICANT CHANGE UP (ref 0–0.5)
EOSINOPHIL NFR BLD AUTO: 1.5 % — SIGNIFICANT CHANGE UP (ref 0–6)
EPI CELLS # UR: SIGNIFICANT CHANGE UP
GLUCOSE SERPL-MCNC: 113 MG/DL — HIGH (ref 70–99)
GLUCOSE UR QL: NEGATIVE MG/DL — SIGNIFICANT CHANGE UP
HCT VFR BLD CALC: 43.1 % — SIGNIFICANT CHANGE UP (ref 39–50)
HGB BLD-MCNC: 13.9 G/DL — SIGNIFICANT CHANGE UP (ref 13–17)
IMM GRANULOCYTES NFR BLD AUTO: 0.6 % — SIGNIFICANT CHANGE UP (ref 0–0.9)
INR BLD: 1.1 RATIO — SIGNIFICANT CHANGE UP (ref 0.88–1.16)
KETONES UR-MCNC: NEGATIVE — SIGNIFICANT CHANGE UP
LEUKOCYTE ESTERASE UR-ACNC: ABNORMAL
LIDOCAIN IGE QN: 29 U/L — SIGNIFICANT CHANGE UP (ref 22–51)
LYMPHOCYTES # BLD AUTO: 1.92 K/UL — SIGNIFICANT CHANGE UP (ref 1–3.3)
LYMPHOCYTES # BLD AUTO: 13.4 % — SIGNIFICANT CHANGE UP (ref 13–44)
MCHC RBC-ENTMCNC: 30.6 PG — SIGNIFICANT CHANGE UP (ref 27–34)
MCHC RBC-ENTMCNC: 32.3 GM/DL — SIGNIFICANT CHANGE UP (ref 32–36)
MCV RBC AUTO: 94.9 FL — SIGNIFICANT CHANGE UP (ref 80–100)
MONOCYTES # BLD AUTO: 1.07 K/UL — HIGH (ref 0–0.9)
MONOCYTES NFR BLD AUTO: 7.4 % — SIGNIFICANT CHANGE UP (ref 2–14)
NEUTROPHILS # BLD AUTO: 11.02 K/UL — HIGH (ref 1.8–7.4)
NEUTROPHILS NFR BLD AUTO: 76.7 % — SIGNIFICANT CHANGE UP (ref 43–77)
NITRITE UR-MCNC: NEGATIVE — SIGNIFICANT CHANGE UP
OB PNL STL: NEGATIVE — SIGNIFICANT CHANGE UP
PH UR: 6 — SIGNIFICANT CHANGE UP (ref 5–8)
PLATELET # BLD AUTO: 238 K/UL — SIGNIFICANT CHANGE UP (ref 150–400)
POTASSIUM SERPL-MCNC: 4.4 MMOL/L — SIGNIFICANT CHANGE UP (ref 3.5–5.3)
POTASSIUM SERPL-SCNC: 4.4 MMOL/L — SIGNIFICANT CHANGE UP (ref 3.5–5.3)
PROT SERPL-MCNC: 7.1 G/DL — SIGNIFICANT CHANGE UP (ref 6.6–8.7)
PROT UR-MCNC: 30 MG/DL
PROTHROM AB SERPL-ACNC: 12.8 SEC — SIGNIFICANT CHANGE UP (ref 10.5–13.4)
RBC # BLD: 4.54 M/UL — SIGNIFICANT CHANGE UP (ref 4.2–5.8)
RBC # FLD: 13.5 % — SIGNIFICANT CHANGE UP (ref 10.3–14.5)
RBC CASTS # UR COMP ASSIST: ABNORMAL /HPF (ref 0–4)
SODIUM SERPL-SCNC: 142 MMOL/L — SIGNIFICANT CHANGE UP (ref 135–145)
SP GR SPEC: 1.01 — SIGNIFICANT CHANGE UP (ref 1.01–1.02)
UROBILINOGEN FLD QL: NEGATIVE MG/DL — SIGNIFICANT CHANGE UP
WBC # BLD: 14.37 K/UL — HIGH (ref 3.8–10.5)
WBC # FLD AUTO: 14.37 K/UL — HIGH (ref 3.8–10.5)
WBC UR QL: ABNORMAL /HPF (ref 0–5)

## 2023-01-24 PROCEDURE — 99284 EMERGENCY DEPT VISIT MOD MDM: CPT

## 2023-01-24 PROCEDURE — 81001 URINALYSIS AUTO W/SCOPE: CPT

## 2023-01-24 PROCEDURE — 85025 COMPLETE CBC W/AUTO DIFF WBC: CPT

## 2023-01-24 PROCEDURE — 93010 ELECTROCARDIOGRAM REPORT: CPT

## 2023-01-24 PROCEDURE — 80053 COMPREHEN METABOLIC PANEL: CPT

## 2023-01-24 PROCEDURE — G1004: CPT

## 2023-01-24 PROCEDURE — 93005 ELECTROCARDIOGRAM TRACING: CPT

## 2023-01-24 PROCEDURE — 74177 CT ABD & PELVIS W/CONTRAST: CPT | Mod: 26,ME

## 2023-01-24 PROCEDURE — 85730 THROMBOPLASTIN TIME PARTIAL: CPT

## 2023-01-24 PROCEDURE — 83690 ASSAY OF LIPASE: CPT

## 2023-01-24 PROCEDURE — 99285 EMERGENCY DEPT VISIT HI MDM: CPT | Mod: 25

## 2023-01-24 PROCEDURE — 74177 CT ABD & PELVIS W/CONTRAST: CPT | Mod: ME

## 2023-01-24 PROCEDURE — 82272 OCCULT BLD FECES 1-3 TESTS: CPT

## 2023-01-24 PROCEDURE — 85610 PROTHROMBIN TIME: CPT

## 2023-01-24 PROCEDURE — 36415 COLL VENOUS BLD VENIPUNCTURE: CPT

## 2023-01-24 PROCEDURE — 87086 URINE CULTURE/COLONY COUNT: CPT

## 2023-01-24 RX ORDER — ACETAMINOPHEN 500 MG
650 TABLET ORAL ONCE
Refills: 0 | Status: COMPLETED | OUTPATIENT
Start: 2023-01-24 | End: 2023-01-24

## 2023-01-24 RX ADMIN — Medication 1 TABLET(S): at 14:50

## 2023-01-24 RX ADMIN — Medication 650 MILLIGRAM(S): at 14:48

## 2023-01-24 NOTE — ED ADULT NURSE NOTE - OBJECTIVE STATEMENT
Patient presents to ED with complaints of bilateral flank pain and abdominal pain since Saturday. Patient was seen at Urgent Care prior to arrival to ED for same symptoms and blood was noted in his urine. He was then advised to come to ED. Patient also reports blood in his stool. Patient denies nausea, vomiting, and diarrhea.

## 2023-01-24 NOTE — ED PROVIDER NOTE - CARE PROVIDERS DIRECT ADDRESSES
[email or web address]

## 2023-01-24 NOTE — ED PROVIDER NOTE - NSICDXPASTMEDICALHX_GEN_ALL_CORE_FT
PAST MEDICAL HISTORY:  Diabetes     Diverticulitis     History of COPD     Hypercholesteremia     Obesity     NJ (obstructive sleep apnea) mouthpiece    NJ and COPD overlap syndrome

## 2023-01-24 NOTE — ED PROVIDER NOTE - PATIENT PORTAL LINK FT
You can access the FollowMyHealth Patient Portal offered by [facility] by registering at the following website: [email or web address]. By joining [facility]’s FollowMyHealth portal, you will also be able to view your health information using other applications (apps) compatible with our system.

## 2023-01-24 NOTE — ED PROVIDER NOTE - NS ED ATTENDING STATEMENT MOD
This was a shared visit with the BRIANNA. I reviewed and verified the documentation and independently performed the documented:

## 2023-01-24 NOTE — ED ADULT TRIAGE NOTE - PAIN: PRESENCE, MLM
Pt's O2 sats have remained stable thus far this shift on 2L/NC, will continue to monitor. complains of pain/discomfort

## 2023-01-24 NOTE — ED PROVIDER NOTE - CARE PROVIDER_API CALL
Aiden Peña)  Gastroenterology; Internal Medicine  [address] [place] [address]  Phone: [phone]  Fax: [phone]  Follow Up Time:

## 2023-01-24 NOTE — ED PROVIDER NOTE - CLINICAL SUMMARY MEDICAL DECISION MAKING FREE TEXT BOX
72M with PMHx of COPD, DM2, diverticulitis, elevated cholesterol presents from [facility] with bilateral lower back pain, abdominal pain, and left flank pain since Sunday getting progressively worse. States at [facility] they found blood in his urine. On Saturday, patient states he found light colored blood in his stool with no clots. Has had loosely formed stool daily since Saturday. Denies CP, SOB, blood thinners, lightheadedness, dizziness, burning/pain with urination, n/v/c, fever, chills, night sweats, headache.    PE    Lab work shows elevated WBC. Coags WNL. Electrolytes WNL. Lipase WNL.  Occult blood feces neg.  EKG NSR.  CT abdomen/pelvis positive for diverticulitis. Will prescribe abx outpatient and f/u with GI.

## 2023-01-24 NOTE — ED PROVIDER NOTE - PHYSICAL EXAMINATION
Gastro: +ttp to left lower, left upper, and periumbilical quadrants. Pain is worse to left lower quadrant. No CVA tenderness. Abdomen soft with no rigidity, and non-distended. No guarding.  MSK: No spinal/midline tenderness. No neck tenderness. +FROM. +ttp left lower back.

## 2023-01-24 NOTE — ED PROVIDER NOTE - ATTENDING APP SHARED VISIT CONTRIBUTION OF CARE
73 y/o M with PMH COPD, DM2, diverticulitis presents for LLQ pain, and BRBPR for the past 2 days. Denies fevers, chills, nausea, vomiting. Has had similar symptoms in the past when he had diverticulitis many years ago.    PE - NAD, RRR, lung ca, abd soft, + LLQ TTP, no guarding.    labs, CT A/P, fluids, pain control, reassess.

## 2023-01-24 NOTE — ED PROVIDER NOTE - OBJECTIVE STATEMENT
72M with PMHx of COPD, DM2, diverticulitis, elevated cholesterol presents from [facility] with bilateral lower back pain, abdominal pain, and left flank pain since Sunday. States at [facility] they found blood in his urine. On Saturday, patient states he found light colored blood in his stools. 72M with PMHx of COPD, DM2, diverticulitis, elevated cholesterol presents from [facility] with bilateral lower back pain, abdominal pain, and left flank pain since Sunday getting progressively worse. States at [facility] they found blood in his urine. On Saturday, patient states he found light colored blood in his stool with no clots. Has had loosely formed stool daily since Saturday. Denies CP, SOB, blood thinners, lightheadedness, dizziness, burning/pain with urination, n/v/c, fever, chills, night sweats, headache.

## 2023-01-24 NOTE — ED ADULT TRIAGE NOTE - CHIEF COMPLAINT QUOTE
Patient presents to ED C/O lower back pain radiating to abdomen, seen at [facility] this morning, patient reports blood in stool, denies dizziness/lightheadedness, no blood thinners, symptoms onset on Sunday.

## 2023-01-24 NOTE — ED PROVIDER NOTE - NSFOLLOWUPINSTRUCTIONS_ED_ALL_ED_FT
Take full course of antibiotics as prescribed even if feeling better.  You can take Tylenol or Motrin as needed for the pain.  Follow-up with gastroenterology within 2-3 days.    Diverticulitis    Diverticulitis is inflammation or infection of small pouches in your colon that form when you HAVE a condition called diverticulosis. This condition can range from mild to severe potentially leading to perforation or obstructions of your colon. Symptoms include abdominal pain, fever/chills, nausea, vomiting, diarrhea, constipation, or blood in your stool. If you were prescribed an antibiotic medicine, take it as told by your health care provider. Do not stop taking the antibiotic even if you start to feel better.    SEEK IMMEDIATE MEDICAL CARE IF YOU HAVE ANY OF THE FOLLOWING SYMPTOMS: worsening abdominal pain, high fever, inability to hold down liquids or medication, black or bloody stools, inability to pass gas, lightheadedness/dizziness, or a change in mental status.

## 2023-01-25 LAB
CULTURE RESULTS: SIGNIFICANT CHANGE UP
SPECIMEN SOURCE: SIGNIFICANT CHANGE UP

## 2023-01-30 PROBLEM — K57.92 DIVERTICULITIS OF INTESTINE, PART UNSPECIFIED, WITHOUT PERFORATION OR ABSCESS WITHOUT BLEEDING: Chronic | Status: ACTIVE | Noted: 2023-01-24

## 2023-01-30 PROBLEM — Z87.09 PERSONAL HISTORY OF OTHER DISEASES OF THE RESPIRATORY SYSTEM: Chronic | Status: ACTIVE | Noted: 2023-01-24

## 2023-02-13 ENCOUNTER — APPOINTMENT (OUTPATIENT)
Dept: FAMILY MEDICINE | Facility: CLINIC | Age: 73
End: 2023-02-13

## 2023-02-16 ENCOUNTER — APPOINTMENT (OUTPATIENT)
Dept: FAMILY MEDICINE | Facility: CLINIC | Age: 73
End: 2023-02-16
Payer: MEDICARE

## 2023-02-16 VITALS
SYSTOLIC BLOOD PRESSURE: 144 MMHG | DIASTOLIC BLOOD PRESSURE: 80 MMHG | HEIGHT: 67 IN | BODY MASS INDEX: 40.49 KG/M2 | RESPIRATION RATE: 14 BRPM | TEMPERATURE: 97.8 F | HEART RATE: 73 BPM | WEIGHT: 258 LBS | OXYGEN SATURATION: 98 %

## 2023-02-16 DIAGNOSIS — Z00.00 ENCOUNTER FOR GENERAL ADULT MEDICAL EXAMINATION W/OUT ABNORMAL FINDINGS: ICD-10-CM

## 2023-02-16 PROCEDURE — G0444 DEPRESSION SCREEN ANNUAL: CPT | Mod: 59

## 2023-02-16 PROCEDURE — G0009: CPT

## 2023-02-16 PROCEDURE — G0439: CPT

## 2023-02-16 PROCEDURE — 90677 PCV20 VACCINE IM: CPT

## 2023-02-16 RX ORDER — AMOXICILLIN AND CLAVULANATE POTASSIUM 875; 125 MG/1; MG/1
875-125 TABLET, COATED ORAL
Qty: 20 | Refills: 0 | Status: DISCONTINUED | COMMUNITY
Start: 2023-01-24

## 2023-02-16 NOTE — ASSESSMENT
[FreeTextEntry1] : CPE- will repeat A1C and bmp today, follow up results.\par \par Prevnar 20 today.\par Referral to GI for colonoscopy given.\par \par DM2-currently taking 1500 mg daily of metformin.  We will repeat A1c and adjust accordingly.  If no improvement or worsening, consider addition of SGLT2 inhib.\par \par COPD-currently stable, continue pulmonology follow-up.\par \par Hyperlipidemia-continue statin\par \par Positive depression screen-patient may benefit and is amenable to trial of SSRI.  We will start Lexapro at 5 mg, potential side effects discussed and resources given.  He will return in 2 months to assess response with adjustments to be made as indicated.\par \par RTC as recommended.\par

## 2023-02-16 NOTE — HEALTH RISK ASSESSMENT
[Intercurrent ED visits] : went to ED [No] : In the past 12 months have you used drugs other than those required for medical reasons? No [No falls in past year] : Patient reported no falls in the past year [1] : 1) Little interest or pleasure doing things for several days (1) [3] : 2) Feeling down, depressed, or hopeless for nearly every day (3) [PHQ-2 Positive] : PHQ-2 Positive [1/2 of Days or More (2)] : 3.) Trouble falling asleep, or sleeping too much? Half the days or more [Several Days (1)] : 5.) Poor appetite or overeating? Several days [Nearly Every Day (3)] : 7.) Trouble concentrating on things, such as reading a newspaper or watching television? Nearly every day [Not at All (0)] : 8.) Moving or speaking so slowly that other people could have noticed, or the opposite, moving or speaking faster than usual? Not at all [Moderate] : severity of depression is moderate [Not at all] : How difficult have these problems made it for you to do your work, take care of things at home, or get along with people? Not at all [I have developed a follow-up plan documented below in the note.] : I have developed a follow-up plan documented below in the note. [With Family] : lives with family [] :  [Fully functional (bathing, dressing, toileting, transferring, walking, feeding)] : Fully functional (bathing, dressing, toileting, transferring, walking, feeding) [Fully functional (using the telephone, shopping, preparing meals, housekeeping, doing laundry, using] : Fully functional and needs no help or supervision to perform IADLs (using the telephone, shopping, preparing meals, housekeeping, doing laundry, using transportation, managing medications and managing finances) [de-identified] : CLAIRE  [[id number]] : 4 [[id number]] : 13 [ColonoscopyComments] : GI referral given today

## 2023-02-16 NOTE — HISTORY OF PRESENT ILLNESS
[Spouse] : spouse [FreeTextEntry1] : here for Physcial pt. was in hospital 3 weeks ago [de-identified] : Sudhir is a pleasant 73 yo M with pmhx of diabetes, hyperlipidemia, hypertension, COPD, who presents today for CPE.  He currently feels well with no acute concerns.  He was in the ER +3 weeks ago for diverticulitis.  He was treated with oral antibiotics and denies any pain.  He follows with [facility] as well as pulmonology for COPD.\par \par For his diabetes, he takes metformin.  At the time of his last visit, his metformin was increased by 500 mg. he currently takes 1000 mg in AM and 500 mg in the evening for total daily dose of 1500 mg. \par \par Cannot recall his last colonoscopy. \par \par Would like PNA 20 today. Is UTD on influenza and COVID vaccines.

## 2023-02-17 LAB
ANION GAP SERPL CALC-SCNC: 15 MMOL/L
BUN SERPL-MCNC: 15 MG/DL
CALCIUM SERPL-MCNC: 9.9 MG/DL
CHLORIDE SERPL-SCNC: 103 MMOL/L
CO2 SERPL-SCNC: 24 MMOL/L
CREAT SERPL-MCNC: 1.42 MG/DL
EGFR: 52 ML/MIN/1.73M2
ESTIMATED AVERAGE GLUCOSE: 157 MG/DL
GLUCOSE SERPL-MCNC: 106 MG/DL
HBA1C MFR BLD HPLC: 7.1 %
POTASSIUM SERPL-SCNC: 4.8 MMOL/L
SODIUM SERPL-SCNC: 141 MMOL/L

## 2023-04-27 ENCOUNTER — APPOINTMENT (OUTPATIENT)
Dept: FAMILY MEDICINE | Facility: CLINIC | Age: 73
End: 2023-04-27
Payer: MEDICARE

## 2023-04-27 VITALS
HEART RATE: 79 BPM | RESPIRATION RATE: 14 BRPM | TEMPERATURE: 97.3 F | WEIGHT: 249 LBS | HEIGHT: 67 IN | BODY MASS INDEX: 39.08 KG/M2 | OXYGEN SATURATION: 91 % | DIASTOLIC BLOOD PRESSURE: 83 MMHG | SYSTOLIC BLOOD PRESSURE: 153 MMHG

## 2023-04-27 VITALS — SYSTOLIC BLOOD PRESSURE: 124 MMHG | DIASTOLIC BLOOD PRESSURE: 68 MMHG

## 2023-04-27 DIAGNOSIS — Z13.31 ENCOUNTER FOR SCREENING FOR DEPRESSION: ICD-10-CM

## 2023-04-27 DIAGNOSIS — R36.1 HEMATOSPERMIA: ICD-10-CM

## 2023-04-27 DIAGNOSIS — D17.23 BENIGN LIPOMATOUS NEOPLASM OF SKIN AND SUBCUTANEOUS TISSUE OF RIGHT LEG: ICD-10-CM

## 2023-04-27 PROCEDURE — 99214 OFFICE O/P EST MOD 30 MIN: CPT | Mod: 25

## 2023-04-27 PROCEDURE — 36415 COLL VENOUS BLD VENIPUNCTURE: CPT

## 2023-04-27 NOTE — ASSESSMENT
[FreeTextEntry1] : HTN-repeat blood pressure acceptable, continue current regimen of losartan 25 mg daily.\par \par Diabetes-recheck A1c today, continue healthy lifestyle modifications and metformin 500 mg 3 times daily.\par \par Hyperlipidemia-continue atorvastatin 40 mg daily\par \par Hematospermia-one isolated episode, will check PSA today. Discussed if recurs, would warrant additional workup including UA, GC/CT testing and referral to urology. \par \par Lipoma-now starting to cause numbness and pain, will refer to general surgery to discuss excision.\par \par Positive depression screening-started on Lexapro 5 mg, however not feeling any difference, will increase to 10 mg and follow-up in 3 months for reevaluation.  Verbally contracts to safety\par \par Screening for colon cancer-patient has had 9 pound weight loss, unintentional but wife has also been helping to improve his diet.  Stool cards given as he declines colonoscopy, will check PSA, otherwise up-to-date on screenings, will continue to monitor\par \par RTC in 3 months or sooner as needed.

## 2023-04-27 NOTE — REVIEW OF SYSTEMS
[Recent Change In Weight] : ~T recent weight change [Negative] : Gastrointestinal [FreeTextEntry8] : one episode of hematospermia

## 2023-04-27 NOTE — HISTORY OF PRESENT ILLNESS
[Spouse] : spouse [FreeTextEntry1] : follow up visit [de-identified] : Sudhir is a 73 yo M with hypertension, diabetes, hyperlipidemia who presents for follow-up.\par \par At the time of our last visit, he was started on Lexapro 5 mg daily.  He states today that he has not noticed any change since starting the medication.  He is tolerating it well but mood is about the same, not worse but not better.\par \par He notes 1 episode of blood-tinged ejaculate last night.  He has never had this in the past.  Denies any pain.  Previous PSA has been normal. Denies hematuria or urinary symptoms, no discharge.\par \par Also noted 9 pound weight loss.  Patient states largely unintentional, but wife has been helping him eat healthier.  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  He has had MRI in the past which confirmed fatty cyst.  He states that it has not bothered him until recently and he has noticed that it is causing him more pain and some numbness on his right leg.  Has not noticed it getting larger in size but the numbness and pain are new.\par

## 2023-04-28 ENCOUNTER — RX RENEWAL (OUTPATIENT)
Age: 73
End: 2023-04-28

## 2023-04-28 LAB
ANION GAP SERPL CALC-SCNC: 15 MMOL/L
BASOPHILS # BLD AUTO: 0.08 K/UL
BASOPHILS NFR BLD AUTO: 0.7 %
BUN SERPL-MCNC: 23 MG/DL
CALCIUM SERPL-MCNC: 9.8 MG/DL
CHLORIDE SERPL-SCNC: 105 MMOL/L
CO2 SERPL-SCNC: 22 MMOL/L
CREAT SERPL-MCNC: 1.57 MG/DL
EGFR: 46 ML/MIN/1.73M2
EOSINOPHIL # BLD AUTO: 0.25 K/UL
EOSINOPHIL NFR BLD AUTO: 2.1 %
ESTIMATED AVERAGE GLUCOSE: 140 MG/DL
GLUCOSE SERPL-MCNC: 109 MG/DL
HBA1C MFR BLD HPLC: 6.5 %
HCT VFR BLD CALC: 43.6 %
HGB BLD-MCNC: 13.3 G/DL
IMM GRANULOCYTES NFR BLD AUTO: 0.4 %
LYMPHOCYTES # BLD AUTO: 2.31 K/UL
LYMPHOCYTES NFR BLD AUTO: 19.8 %
MAN DIFF?: NORMAL
MCHC RBC-ENTMCNC: 30.1 PG
MCHC RBC-ENTMCNC: 30.5 GM/DL
MCV RBC AUTO: 98.6 FL
MONOCYTES # BLD AUTO: 0.8 K/UL
MONOCYTES NFR BLD AUTO: 6.8 %
NEUTROPHILS # BLD AUTO: 8.19 K/UL
NEUTROPHILS NFR BLD AUTO: 70.2 %
PLATELET # BLD AUTO: 326 K/UL
POTASSIUM SERPL-SCNC: 4.8 MMOL/L
PSA SERPL-MCNC: 11.1 NG/ML
RBC # BLD: 4.42 M/UL
RBC # FLD: 13.8 %
SODIUM SERPL-SCNC: 143 MMOL/L
WBC # FLD AUTO: 11.68 K/UL

## 2023-04-29 ENCOUNTER — LABORATORY RESULT (OUTPATIENT)
Age: 73
End: 2023-04-29

## 2023-05-02 ENCOUNTER — LABORATORY RESULT (OUTPATIENT)
Age: 73
End: 2023-05-02

## 2023-05-04 ENCOUNTER — APPOINTMENT (OUTPATIENT)
Dept: SURGERY | Facility: CLINIC | Age: 73
End: 2023-05-04
Payer: MEDICARE

## 2023-05-04 VITALS
HEIGHT: 67.5 IN | BODY MASS INDEX: 31.64 KG/M2 | OXYGEN SATURATION: 96 % | DIASTOLIC BLOOD PRESSURE: 76 MMHG | WEIGHT: 204 LBS | RESPIRATION RATE: 16 BRPM | HEART RATE: 80 BPM | SYSTOLIC BLOOD PRESSURE: 129 MMHG | TEMPERATURE: 96.3 F

## 2023-05-04 DIAGNOSIS — R22.40 LOCALIZED SWELLING, MASS AND LUMP, UNSPECIFIED LOWER LIMB: ICD-10-CM

## 2023-05-04 PROCEDURE — 99204 OFFICE O/P NEW MOD 45 MIN: CPT

## 2023-05-04 NOTE — HISTORY OF PRESENT ILLNESS
[de-identified] : Patient is a 72 year old male with PMH of COPD, HTN, HLD, sleep apnea, DM presenting for evaluation of a right leg mass. Patient states that it has been present there for about 40 years. As of 6 months ago, the mass has been causing him pain in the area as well as numbness. He does not take anything to relive pain. \par \par Patient sees pulmonologist biannually. He has no previous bleeding disorder, does not take any anticoagulation medication, has had surgery before and no issues with anesthesia.  [de-identified] : Doing well today. \par He is interested in surgery for removal of RLE mass in the anterior thigg

## 2023-05-04 NOTE — ASSESSMENT
[FreeTextEntry1] : Mr. REA is a 72 year old man with right thigh mass. We discussed the options of excision and nonoperative management. The risks/benefits and alternatives were discussed at length and all questions were answered. The patient appears to understand and wishes to proceed with excision of right thigh mass.\par

## 2023-05-04 NOTE — CONSULT LETTER
[Dear  ___] : Dear  [unfilled], [Consult Letter:] : I had the pleasure of evaluating your patient, [unfilled]. [Please see my note below.] : Please see my note below. [Consult Closing:] : Thank you very much for allowing me to participate in the care of this patient.  If you have any questions, please do not hesitate to contact me. [Sincerely,] : Sincerely, [FreeTextEntry3] : Demetrius Jimenez MD, FACS\par Minimally Invasive and Bariatric Surgery\par [facility]

## 2023-05-04 NOTE — PHYSICAL EXAM
[Normal Rate and Rhythm] : normal rate and rhythm [Alert] : alert [Oriented to Person] : oriented to person [Tender] : was nontender [de-identified] : well groomed, NAD [de-identified] : PEERL [de-identified] : trachea midline [de-identified] : symetric chest expansion [de-identified] : obtund [de-identified] : 5+ muscle bulk  [de-identified] : 10 x 10 mass in the anterior thigh of the right lower extremity. No overlying skin changes

## 2023-05-04 NOTE — REVIEW OF SYSTEMS
[Shortness Of Breath] : shortness of breath [SOB on Exertion] : shortness of breath during exertion [Depression] : depression [Fever] : no fever [Chills] : no chills [Eye Pain] : no eye pain [Red Eyes] : eyes not red [Earache] : no earache [Loss Of Hearing] : no hearing loss [Chest Pain] : no chest pain [Palpitations] : no palpitations [Abdominal Pain] : no abdominal pain [Vomiting] : no vomiting [Dysuria] : no dysuria [Incontinence] : no incontinence [Joint Swelling] : no joint swelling [Joint Stiffness] : no joint stiffness [Skin Lesions] : no skin lesions [Skin Wound] : no skin wound [Confused] : no confusion [Convulsions] : no convulsions [Suicidal] : not suicidal [Proptosis] : no proptosis [Hot Flashes] : no hot flashes [Easy Bleeding] : no tendency for easy bleeding [Easy Bruising] : no tendency for easy bruising [de-identified] : 10 x 10 mass in the anterior thigh of the right lower extremity. No overlying skin changes

## 2023-05-05 NOTE — HISTORY OF PRESENT ILLNESS
[FreeTextEntry1] : Dear Dr. Kami Hernandez\par \par Thank you so much for the referral to help care for your patient. \par \par Chief Complaint [ ] \par Date of first visit: 05/08 /2023 \par \par  LIA REA is a 72 year old ~race man ~ who presents for [ ] \par \par PSA Hx \par 5.37 ng/ml on 05/02/2023\par 11.10 ng/ml on 04/27/2023\par 3.39 ng/ml on 11/14/2022\par 2.90 ng/ml on 04/26/2021\par 3.97 ng/ml on 06/10/2019\par \par MRI Hx \par MRI [ ] /2023. [ ] cc prostate with PIRADS [ ] lesion measuring [ ]. [ ] No LAD No EPE, No Bony Lesions. The clinical implications have been discussed with the patient. \par \par Biopsy Hx \par N/A\par \par 05/08/2023 \par IPSS [] QOL [] \par IIEF\par \par Prostate cancer screening: The patient and I spoke at length about prostate cancer screening, its risks and its benefits. The patient has [ ] risk factors for prostate cancer. He understands that many men with prostate cancer will die with the disease rather than of it and we also discussed the results large multi-center American and  prostate cancer screening trials. He also understands that PSA in and of itself does not diagnose prostate cancer but only assesses risk to a certain degree. The patient understands that to definitively screen for prostate cancer, a biopsy is required, and this procedure has risks, including bleeding, infection, ED and urinary retention. The patient opted to [ ]. \par \par The patient denies fevers, chills, nausea and or vomiting and no unexplained weight loss. \par \par All pertinent laboratory, films and physician notes were reviewed. Questionnaire results were discussed with patient.\par

## 2023-05-07 ENCOUNTER — EMERGENCY (EMERGENCY)
Facility: HOSPITAL | Age: 73
LOS: 1 days | Discharge: HOME | End: 2023-05-07
Attending: EMERGENCY MEDICINE
Payer: MEDICARE

## 2023-05-07 VITALS
DIASTOLIC BLOOD PRESSURE: 81 MMHG | SYSTOLIC BLOOD PRESSURE: 168 MMHG | OXYGEN SATURATION: 96 % | RESPIRATION RATE: 19 BRPM | HEART RATE: 67 BPM | TEMPERATURE: 98 F

## 2023-05-07 VITALS
TEMPERATURE: 98 F | SYSTOLIC BLOOD PRESSURE: 164 MMHG | HEART RATE: 67 BPM | HEIGHT: 67 IN | OXYGEN SATURATION: 94 % | RESPIRATION RATE: 16 BRPM | WEIGHT: 250 LBS | DIASTOLIC BLOOD PRESSURE: 136 MMHG

## 2023-05-07 DIAGNOSIS — R63.4 ABNORMAL WEIGHT LOSS: ICD-10-CM

## 2023-05-07 DIAGNOSIS — K62.5 HEMORRHAGE OF ANUS AND RECTUM: ICD-10-CM

## 2023-05-07 DIAGNOSIS — Z98.890 OTHER SPECIFIED POSTPROCEDURAL STATES: Chronic | ICD-10-CM

## 2023-05-07 DIAGNOSIS — R10.9 UNSPECIFIED ABDOMINAL PAIN: ICD-10-CM

## 2023-05-07 DIAGNOSIS — Z90.49 ACQUIRED ABSENCE OF OTHER SPECIFIED PARTS OF DIGESTIVE TRACT: Chronic | ICD-10-CM

## 2023-05-07 LAB
ALBUMIN SERPL ELPH-MCNC: 3.8 G/DL — SIGNIFICANT CHANGE UP (ref 3.3–5.2)
ALP SERPL-CCNC: 95 U/L — SIGNIFICANT CHANGE UP (ref 40–120)
ALT FLD-CCNC: 13 U/L — SIGNIFICANT CHANGE UP
ANION GAP SERPL CALC-SCNC: 14 MMOL/L — SIGNIFICANT CHANGE UP (ref 5–17)
APTT BLD: 27.8 SEC — SIGNIFICANT CHANGE UP (ref 27.5–35.5)
AST SERPL-CCNC: 22 U/L — SIGNIFICANT CHANGE UP
BASOPHILS # BLD AUTO: 0.05 K/UL — SIGNIFICANT CHANGE UP (ref 0–0.2)
BASOPHILS # BLD AUTO: 0.06 K/UL — SIGNIFICANT CHANGE UP (ref 0–0.2)
BASOPHILS NFR BLD AUTO: 0.4 % — SIGNIFICANT CHANGE UP (ref 0–2)
BASOPHILS NFR BLD AUTO: 0.5 % — SIGNIFICANT CHANGE UP (ref 0–2)
BILIRUB SERPL-MCNC: 0.7 MG/DL — SIGNIFICANT CHANGE UP (ref 0.4–2)
BLD GP AB SCN SERPL QL: SIGNIFICANT CHANGE UP
BUN SERPL-MCNC: 19.1 MG/DL — SIGNIFICANT CHANGE UP (ref 8–20)
CALCIUM SERPL-MCNC: 9.4 MG/DL — SIGNIFICANT CHANGE UP (ref 8.4–10.5)
CHLORIDE SERPL-SCNC: 103 MMOL/L — SIGNIFICANT CHANGE UP (ref 96–108)
CO2 SERPL-SCNC: 24 MMOL/L — SIGNIFICANT CHANGE UP (ref 22–29)
CREAT SERPL-MCNC: 1.27 MG/DL — SIGNIFICANT CHANGE UP (ref 0.5–1.3)
EGFR: 59 ML/MIN/1.73M2 — LOW
EOSINOPHIL # BLD AUTO: 0.22 K/UL — SIGNIFICANT CHANGE UP (ref 0–0.5)
EOSINOPHIL # BLD AUTO: 0.23 K/UL — SIGNIFICANT CHANGE UP (ref 0–0.5)
EOSINOPHIL NFR BLD AUTO: 1.9 % — SIGNIFICANT CHANGE UP (ref 0–6)
EOSINOPHIL NFR BLD AUTO: 1.9 % — SIGNIFICANT CHANGE UP (ref 0–6)
GLUCOSE SERPL-MCNC: 82 MG/DL — SIGNIFICANT CHANGE UP (ref 70–99)
HCT VFR BLD CALC: 38.7 % — LOW (ref 39–50)
HCT VFR BLD CALC: 39.9 % — SIGNIFICANT CHANGE UP (ref 39–50)
HGB BLD-MCNC: 12.4 G/DL — LOW (ref 13–17)
HGB BLD-MCNC: 13.1 G/DL — SIGNIFICANT CHANGE UP (ref 13–17)
IMM GRANULOCYTES NFR BLD AUTO: 0.4 % — SIGNIFICANT CHANGE UP (ref 0–0.9)
IMM GRANULOCYTES NFR BLD AUTO: 0.8 % — SIGNIFICANT CHANGE UP (ref 0–0.9)
INR BLD: 1.09 RATIO — SIGNIFICANT CHANGE UP (ref 0.88–1.16)
LYMPHOCYTES # BLD AUTO: 18.8 % — SIGNIFICANT CHANGE UP (ref 13–44)
LYMPHOCYTES # BLD AUTO: 18.9 % — SIGNIFICANT CHANGE UP (ref 13–44)
LYMPHOCYTES # BLD AUTO: 2.13 K/UL — SIGNIFICANT CHANGE UP (ref 1–3.3)
LYMPHOCYTES # BLD AUTO: 2.33 K/UL — SIGNIFICANT CHANGE UP (ref 1–3.3)
MCHC RBC-ENTMCNC: 29.5 PG — SIGNIFICANT CHANGE UP (ref 27–34)
MCHC RBC-ENTMCNC: 30.3 PG — SIGNIFICANT CHANGE UP (ref 27–34)
MCHC RBC-ENTMCNC: 32 GM/DL — SIGNIFICANT CHANGE UP (ref 32–36)
MCHC RBC-ENTMCNC: 32.8 GM/DL — SIGNIFICANT CHANGE UP (ref 32–36)
MCV RBC AUTO: 91.9 FL — SIGNIFICANT CHANGE UP (ref 80–100)
MCV RBC AUTO: 92.4 FL — SIGNIFICANT CHANGE UP (ref 80–100)
MONOCYTES # BLD AUTO: 0.72 K/UL — SIGNIFICANT CHANGE UP (ref 0–0.9)
MONOCYTES # BLD AUTO: 0.81 K/UL — SIGNIFICANT CHANGE UP (ref 0–0.9)
MONOCYTES NFR BLD AUTO: 5.9 % — SIGNIFICANT CHANGE UP (ref 2–14)
MONOCYTES NFR BLD AUTO: 7.1 % — SIGNIFICANT CHANGE UP (ref 2–14)
NEUTROPHILS # BLD AUTO: 8.03 K/UL — HIGH (ref 1.8–7.4)
NEUTROPHILS # BLD AUTO: 8.92 K/UL — HIGH (ref 1.8–7.4)
NEUTROPHILS NFR BLD AUTO: 70.9 % — SIGNIFICANT CHANGE UP (ref 43–77)
NEUTROPHILS NFR BLD AUTO: 72.5 % — SIGNIFICANT CHANGE UP (ref 43–77)
OB PNL STL: POSITIVE
PLATELET # BLD AUTO: 242 K/UL — SIGNIFICANT CHANGE UP (ref 150–400)
PLATELET # BLD AUTO: 267 K/UL — SIGNIFICANT CHANGE UP (ref 150–400)
POTASSIUM SERPL-MCNC: 5 MMOL/L — SIGNIFICANT CHANGE UP (ref 3.5–5.3)
POTASSIUM SERPL-SCNC: 5 MMOL/L — SIGNIFICANT CHANGE UP (ref 3.5–5.3)
PROT SERPL-MCNC: 6.9 G/DL — SIGNIFICANT CHANGE UP (ref 6.6–8.7)
PROTHROM AB SERPL-ACNC: 12.6 SEC — SIGNIFICANT CHANGE UP (ref 10.5–13.4)
RBC # BLD: 4.21 M/UL — SIGNIFICANT CHANGE UP (ref 4.2–5.8)
RBC # BLD: 4.32 M/UL — SIGNIFICANT CHANGE UP (ref 4.2–5.8)
RBC # FLD: 13.5 % — SIGNIFICANT CHANGE UP (ref 10.3–14.5)
RBC # FLD: 13.5 % — SIGNIFICANT CHANGE UP (ref 10.3–14.5)
SODIUM SERPL-SCNC: 141 MMOL/L — SIGNIFICANT CHANGE UP (ref 135–145)
WBC # BLD: 11.34 K/UL — HIGH (ref 3.8–10.5)
WBC # BLD: 12.3 K/UL — HIGH (ref 3.8–10.5)
WBC # FLD AUTO: 11.34 K/UL — HIGH (ref 3.8–10.5)
WBC # FLD AUTO: 12.3 K/UL — HIGH (ref 3.8–10.5)

## 2023-05-07 PROCEDURE — 85610 PROTHROMBIN TIME: CPT

## 2023-05-07 PROCEDURE — 99284 EMERGENCY DEPT VISIT MOD MDM: CPT | Mod: 25

## 2023-05-07 PROCEDURE — 74177 CT ABD & PELVIS W/CONTRAST: CPT | Mod: MA

## 2023-05-07 PROCEDURE — 86901 BLOOD TYPING SEROLOGIC RH(D): CPT

## 2023-05-07 PROCEDURE — 85025 COMPLETE CBC W/AUTO DIFF WBC: CPT

## 2023-05-07 PROCEDURE — 86850 RBC ANTIBODY SCREEN: CPT

## 2023-05-07 PROCEDURE — 36415 COLL VENOUS BLD VENIPUNCTURE: CPT

## 2023-05-07 PROCEDURE — 74177 CT ABD & PELVIS W/CONTRAST: CPT | Mod: 26,MA

## 2023-05-07 PROCEDURE — 85730 THROMBOPLASTIN TIME PARTIAL: CPT

## 2023-05-07 PROCEDURE — 99283 EMERGENCY DEPT VISIT LOW MDM: CPT

## 2023-05-07 PROCEDURE — 82272 OCCULT BLD FECES 1-3 TESTS: CPT

## 2023-05-07 PROCEDURE — 99284 EMERGENCY DEPT VISIT MOD MDM: CPT

## 2023-05-07 PROCEDURE — 86900 BLOOD TYPING SEROLOGIC ABO: CPT

## 2023-05-07 PROCEDURE — 80053 COMPREHEN METABOLIC PANEL: CPT

## 2023-05-07 RX ADMIN — Medication 1 TABLET(S): at 23:33

## 2023-05-07 NOTE — ED PROVIDER NOTE - PHYSICAL EXAMINATION
Gen: no acute distress  Head: normocephalic, atraumatic  EENT: EOMI  Lung: no increased work of breathing, CTABL  CV: normal s1/s2,   Abd: soft, non-tender, non-distended, no rebound tenderness or guarding  MSK: no visible deformities, full range of motion in all 4 extremities  Neuro: A&Ox4; No focal neurologic deficits  Rectal: no external hemorrhoids; scant brown stool with mucus sent for occult  Chaperone: Artur Kwong

## 2023-05-07 NOTE — ED ADULT NURSE NOTE - OBJECTIVE STATEMENT
Assumed care of pt at 1357 Pt A&Ox4 c/o bright red stool this AM. PT states it was a "moderate" amount. Pt has had this issue since January, Pt denies abdominal cramping, nausea/ vomiting/ CP and SOB. RR Even and unlabored.

## 2023-05-07 NOTE — ED ADULT NURSE REASSESSMENT NOTE - NS ED NURSE REASSESS COMMENT FT1
Assumed care of patient at approx 19:30. Patient AxOx4. Patient denies pain/discomfort. Patient has been updated on the plan of care and awaiting CT. Patient offers no complaints at this time. No visible signs of acute distress noted, safety maintained.

## 2023-05-07 NOTE — ED ADULT NURSE NOTE - CAS ELECT INFOMATION PROVIDED
Patient ambulated off unit in steay gait, in no acute distress at time of discharge./DC instructions

## 2023-05-07 NOTE — ED PROVIDER NOTE - PATIENT PORTAL LINK FT
13
You can access the FollowMyHealth Patient Portal offered by [facility] by registering at the following website: [email or web address]. By joining [facility]’s FollowMyHealth portal, you will also be able to view your health information using other applications (apps) compatible with our system.

## 2023-05-07 NOTE — CONSULT NOTE ADULT - NSCONSULTADDITIONALINFOA_GEN_ALL_CORE
I spoke to Dr Kwong   pt will stay in observation  obtain CT to r/o diverticulitis. Check hemoglobin in few hours. If mild diverticulitis and hemoglobin stable then give antibiotics and send home.   If CT negative and hg stable, then D/C home. I will expedite F/U with me for colonoscopy . Timing of colonoscopy will depend if pt has diverticulitis. ( colon in the next 2 weeks if no diverticulitis, colon in 6 weeks if + for diverticulitis )

## 2023-05-07 NOTE — ED PROVIDER NOTE - CARE PROVIDER_API CALL
Dimitris Finney)  Gastroenterology; Internal Medicine  [address], [address]  [place] [address]  Phone: [phone]  Fax: [phone]  Follow Up Time:

## 2023-05-07 NOTE — ED PROVIDER NOTE - ATTENDING CONTRIBUTION TO CARE
seen with resident; pleasant adult male with vague lower abdominal discomfort and a few episodes of BRBPR; no active bleeding in ED: on exam, well appearing, NAD, normal heart and lung sounds; abd soft with mild lower abd ttp; no camilo no guarding; scant stool on rectal exam, no visible blood; labs imaging and GI consultation all reviewed; safe and stable for d/c' with antibx    I personally saw the patient with the resident, and completed the key components of the history and physical exam. I then discussed the management plan with the resident.

## 2023-05-07 NOTE — ED PROVIDER NOTE - OBJECTIVE STATEMENT
72-year-old male with PMH of COPD not on home O2, DM 2, HLD, diverticulitis presenting with intermittent bright red blood per rectum.  Patient reports that he has had this in the past and was last seen in January for this issue.  He is supposed to see GI in the outpatient setting in August later this year.  He endorses having a bloody bowel movement today which is bright red and a moderate amount.  Reports some discomfort when straining to use the bathroom.  He endorses that at 10 PM weight loss and reports that his fit test came back positive.  Reports that his last colonoscopy was more than 10 years ago.  He has never had endoscopy.  He is endorsing some lower abdominal pain over the past few months.

## 2023-05-07 NOTE — CONSULT NOTE ADULT - SUBJECTIVE AND OBJECTIVE BOX
Patient is a 72y old  Male who presents with a chief complaint of     HPI: Patient with hx of COPD ( not on O2 ) , NJ ( not on CPAP ) , DM, high cholesterol. hx of colon polyps 15 years ago with Dr Juares,        Patient had a uncomplicated diverticulitis in 1/2023.  Saw primary MD and did not want colonoscopy. Pt then had + FIT test.  No family hx of colon cancer. Has routine consult with Dr Kingsley scheduled in 3 months.    Patient states he gets intermittent straining with stool.  He has been having intermittent mild rectal bleeding for 3 months. This week he had some straining and had blood on 4/28, 5/ 3 and then today. Today  " the bowl was full ". Mild rectal discomfort. Has lower abdominal cramping pain.  NO heartburn, no regurgiation. Does not take anticoagulation, no NSAIDS .  on 4/28 pt had hemoglobin 13.3. Today hemoglobin 13. 1.  Occult blood +.  Describes  9 lb weight loss in last 3 months- states he is trying to eat healthy      REVIEW OF SYSTEMS:  Constitutional: No fever,+ weight loss or fatigue  ENMT:  No difficulty hearing, tinnitus, vertigo; No sinus or throat pain  Respiratory: No cough, wheezing, chills or hemoptysis  Cardiovascular: No chest pain, palpitations, dizziness or leg swelling  Gastrointestinal: No abdominal or epigastric pain. No nausea, vomiting or hematemesis; No diarrhea or constipation. + hematochezia.  Skin: No itching, burning, rashes or lesions   Musculoskeletal: No joint pain or swelling; No muscle, back or extremity pain    PAST MEDICAL & SURGICAL HISTORY:  NJ (obstructive sleep apnea)  mouthpiece      Diabetes      Obesity      NJ and COPD overlap syndrome      Hypercholesteremia      History of COPD      Diverticulitis      H/O hernia repair      History of appendectomy          FAMILY HISTORY:  mother with pancreatic cancer       SOCIAL HISTORY:  Smoking Status: [ ] Current, [ ] Former, [ ] Never  Pack Years:  [  ] EtOH-no  [  ] IVDA    MEDICATIONS:  MEDICATIONS  (STANDING):    MEDICATIONS  (PRN):      Allergies    No Known Allergies    Intolerances        Vital Signs Last 24 Hrs  T(C): 36.8 (07 May 2023 12:04), Max: 36.8 (07 May 2023 12:04)  T(F): 98.2 (07 May 2023 12:04), Max: 98.2 (07 May 2023 12:04)  HR: 67 (07 May 2023 12:04) (67 - 67)  BP: 164/136 (07 May 2023 12:04) (164/136 - 164/136)  BP(mean): --  RR: 16 (07 May 2023 12:04) (16 - 16)  SpO2: 94% (07 May 2023 12:04) (94% - 94%)    Parameters below as of 07 May 2023 12:04  Patient On (Oxygen Delivery Method): room air            PHYSICAL EXAM:    General:  in no acute distress  HEENT: MMM, conjunctiva and sclera clear  H-RRR  L-CTA  Gastrointestinal: Soft, non-tender non-distended; Normal bowel sounds; No rebound or guarding. mild tenderness on palpation   Extremities: Normal range of motion, No clubbing, cyanosis or edema  Neurological: Alert and oriented x3  Skin: Warm and dry. No obvious rash  rectal- scant brown stool       LABS:                        13.1   11.34 )-----------( 267      ( 07 May 2023 13:33 )             39.9     07 May 2023 13:33    141    |  103    |  19.1   ----------------------------<  82     5.0     |  24.0   |  1.27     Ca    9.4        07 May 2023 13:33    TPro  6.9    /  Alb  3.8    /  TBili  0.7    /  DBili  x      /  AST  22     /  ALT  13     /  AlkPhos  95     / Amylase x      /Lipase x      07 May 2023 13:33              RADIOLOGY & ADDITIONAL STUDIES:

## 2023-05-07 NOTE — ED PROVIDER NOTE - CLINICAL SUMMARY MEDICAL DECISION MAKING FREE TEXT BOX
72-year-old male with PMH of COPD not on home O2, DM 2, HLD, diverticulitis presenting with intermittent bright red blood per rectum. 72-year-old male with PMH of COPD not on home O2, DM 2, HLD, diverticulitis presenting with intermittent bright red blood per rectum. Occult stool positive. Patient hemodynamically stable. Patient seen by GI Dr. Alonzo. Recommendation is CT abd/pel to evaluate for diverticulitis. If patient without diverticulitis will follow-up in 2 weeks for colonoscopy. If patient with diverticulitis will need antibiotics and colonoscopy in ~6 weeks.

## 2023-05-07 NOTE — ED PROVIDER NOTE - CARE PLAN
1 Principal Discharge DX:	Rectal bleeding   Principal Discharge DX:	Rectal bleeding  Secondary Diagnosis:	Diverticulitis

## 2023-05-07 NOTE — CONSULT NOTE ADULT - PROBLEM SELECTOR RECOMMENDATION 9
no overt bleeding on my rectal exam   occult blood +, hx of colon polyps  possibly due to hemorrhoids  hemoglobin stable

## 2023-05-08 ENCOUNTER — NON-APPOINTMENT (OUTPATIENT)
Age: 73
End: 2023-05-08

## 2023-05-08 ENCOUNTER — APPOINTMENT (OUTPATIENT)
Dept: UROLOGY | Facility: CLINIC | Age: 73
End: 2023-05-08

## 2023-05-09 ENCOUNTER — APPOINTMENT (OUTPATIENT)
Dept: UROLOGY | Facility: CLINIC | Age: 73
End: 2023-05-09
Payer: MEDICARE

## 2023-05-09 VITALS — SYSTOLIC BLOOD PRESSURE: 107 MMHG | DIASTOLIC BLOOD PRESSURE: 65 MMHG | HEART RATE: 78 BPM

## 2023-05-09 PROCEDURE — 99203 OFFICE O/P NEW LOW 30 MIN: CPT

## 2023-05-09 NOTE — LETTER BODY
[Dear  ___] : Dear  [unfilled], [Courtesy Letter:] : I had the pleasure of seeing your patient, [unfilled], in my office today. [Please see my note below.] : Please see my note below. [Sincerely,] : Sincerely, [FreeTextEntry3] : Ed\par \par Joni Martinez MD\par [facility]\par  of Urology\par [employer] [facility]\par

## 2023-05-09 NOTE — ASSESSMENT
[FreeTextEntry1] : Impression:\par \par elevated PSA, doubt significant\par \par Plan:'\par \par repeat free and total PSA in one month. \par \par If still elevated will get MRI. \par Age adjusted rates are normal.

## 2023-05-09 NOTE — HISTORY OF PRESENT ILLNESS
[FreeTextEntry1] : Patient presents with elevated PSA. \par \par He had had intercourse around the time of his PSA.  he had some hematospermia. \par \par No family history of prostate cancer or prostate enlargement. No prior elevated PSA except the last tow.  \par \par no bone pain or joint pain.

## 2023-05-15 ENCOUNTER — RX RENEWAL (OUTPATIENT)
Age: 73
End: 2023-05-15

## 2023-05-23 LAB
PSA FREE FLD-MCNC: 24 %
PSA FREE SERPL-MCNC: 0.7 NG/ML
PSA SERPL-MCNC: 2.98 NG/ML

## 2023-05-30 ENCOUNTER — RX RENEWAL (OUTPATIENT)
Age: 73
End: 2023-05-30

## 2023-06-01 ENCOUNTER — APPOINTMENT (OUTPATIENT)
Dept: PULMONOLOGY | Facility: CLINIC | Age: 73
End: 2023-06-01
Payer: MEDICARE

## 2023-06-01 VITALS
SYSTOLIC BLOOD PRESSURE: 132 MMHG | OXYGEN SATURATION: 93 % | RESPIRATION RATE: 16 BRPM | DIASTOLIC BLOOD PRESSURE: 72 MMHG | HEART RATE: 74 BPM

## 2023-06-01 VITALS — OXYGEN SATURATION: 95 %

## 2023-06-01 VITALS — HEIGHT: 67 IN | BODY MASS INDEX: 39.24 KG/M2 | WEIGHT: 250 LBS

## 2023-06-01 DIAGNOSIS — Z87.891 PERSONAL HISTORY OF NICOTINE DEPENDENCE: ICD-10-CM

## 2023-06-01 PROCEDURE — 94010 BREATHING CAPACITY TEST: CPT

## 2023-06-01 PROCEDURE — 94727 GAS DIL/WSHOT DETER LNG VOL: CPT

## 2023-06-01 PROCEDURE — 85018 HEMOGLOBIN: CPT | Mod: QW

## 2023-06-01 PROCEDURE — 99215 OFFICE O/P EST HI 40 MIN: CPT | Mod: 25

## 2023-06-01 PROCEDURE — 94729 DIFFUSING CAPACITY: CPT

## 2023-06-01 NOTE — PROCEDURE
[FreeTextEntry1] : CT 11/22 stable reticular abn, peripheral and subpleural, traction bronchiectasis- prob IPF\par \par CT abd/pelvis 5/23 , no change peripheral scarring RLL\par \par PFts worsening air flow obstruction, mod restrictive impairment and severely impaired DLCO- worsened from 11/21\par

## 2023-06-01 NOTE — HISTORY OF PRESENT ILLNESS
[Former] : former [TextBox_4] : at baseline  no sig sputum\par  no sig change in status\par  chronic exertional  dyspnea, no change\par  using spiriva  respimat\par had sleep study, mod NJ ( AHI 20) non compliant with  oral appliance\par     25 pack yrs smoker, quit 25 yrs ago [facility]\par had covid booster, Pfizer, vax x 4\par \par \par 6/1/23\par some cough, with sputum- not sure of color\par no fever, chill, chest pain\par worsening ALDRICH, not very active per pt\par Oral appliance - not wearing\par Spiriva Respimat daily\par for surgery R thigh growth\par just finished abx for diverticulitis\par  [YearQuit] : 2000 [FreeTextEntry1] : \par

## 2023-06-01 NOTE — PHYSICAL EXAM
[General Appearance - Well Developed] : well developed [Normal Appearance] : normal appearance [General Appearance - Well Nourished] : well nourished [No Deformities] : no deformities [III] : III [Neck Appearance] : the appearance of the neck was normal [Neck Cervical Mass (___cm)] : no neck mass was observed [Heart Rate And Rhythm] : heart rate and rhythm were normal [Heart Sounds] : normal S1 and S2 [Murmurs] : no murmurs present [Edema] : no peripheral edema present [Respiration, Rhythm And Depth] : normal respiratory rhythm and effort [Exaggerated Use Of Accessory Muscles For Inspiration] : no accessory muscle use [Lungs Percussion] : the lungs were normal to percussion [Abnormal Walk] : normal gait [Nail Clubbing] : no clubbing of the fingernails [No Focal Deficits] : no focal deficits [Impaired Insight] : insight and judgment were intact [Oriented To Time, Place, And Person] : oriented to person, place, and time [Affect] : the affect was normal [Memory Recent] : recent memory was not impaired [] : no rash [FreeTextEntry1] : no chest wall abn

## 2023-06-01 NOTE — DISCUSSION/SUMMARY
[FreeTextEntry1] : COPD Gold grade III complicated by interstitial lung disease component /clinical UIP\par connective tissue serologies are negative, was tolerating pirfenidone but denies benefit and stopped, saw second opinion who felt more asbestosis related, pt getting legal assistance for that \par My review of pulmonary ILD with areas of ossification were less clear and included favor UIP\par Last  CT 11/22  stable, Recent CT abd/pelvis lung bases stable scarring\par Clinically worsening dyspnea and PFts with both worsening air flow obstruction and restriction\par Will rx as exacerbation, prednisone taper ( advised to check glucose with PMD), abx \par Change from Spiriva to Trelegy 100 and technique reviewed\par Repeat CT chest for any progression of ILD, refused another anti fibrotic trial in past\par Hold on thigh mass surgery for few weeks, will reach out to Dr Jimenez\par obesity with moderate  obstructive sleep apnea - ( AHI 20) intolerant to CPAP , not using oral appliance\par 2 weeks or sooner if needed\par

## 2023-06-01 NOTE — CONSULT LETTER
[Dear  ___] : Dear  [unfilled], [Please see my note below.] : Please see my note below. [Consult Letter:] : I had the pleasure of evaluating your patient, [unfilled]. [Consult Closing:] : Thank you very much for allowing me to participate in the care of this patient.  If you have any questions, please do not hesitate to contact me. [Sincerely,] : Sincerely, [DrIsaias  ___] : Dr. CHAPA [Fede Wilkinson DO, [facility]] : Fede Wilkinson DO, [facility] [Director, Respiratory Care] : Director, Respiratory Care [[facility]] : [facility] [FreeTextEntry3] : [facility] Wilkinson DO [facility]\par Pulmonary Critical Care\par Director Pulmonary Division\par Medical Director Respiratory Therapy\par [facility]\par \par

## 2023-06-06 ENCOUNTER — APPOINTMENT (OUTPATIENT)
Dept: UROLOGY | Facility: CLINIC | Age: 73
End: 2023-06-06
Payer: MEDICARE

## 2023-06-06 VITALS — HEART RATE: 61 BPM | DIASTOLIC BLOOD PRESSURE: 72 MMHG | SYSTOLIC BLOOD PRESSURE: 129 MMHG

## 2023-06-06 DIAGNOSIS — R97.20 ELEVATED PROSTATE, SPECIFIC ANTIGEN [PSA]: ICD-10-CM

## 2023-06-06 PROCEDURE — 99212 OFFICE O/P EST SF 10 MIN: CPT

## 2023-06-06 RX ORDER — PREDNISONE 10 MG/1
10 TABLET ORAL
Qty: 30 | Refills: 3 | Status: DISCONTINUED | COMMUNITY
Start: 2023-06-01 | End: 2023-06-06

## 2023-06-06 RX ORDER — DOXYCYCLINE HYCLATE 100 MG/1
100 CAPSULE ORAL
Qty: 10 | Refills: 2 | Status: DISCONTINUED | COMMUNITY
Start: 2023-06-01 | End: 2023-06-06

## 2023-06-06 RX ORDER — TIOTROPIUM BROMIDE INHALATION SPRAY 3.12 UG/1
2.5 SPRAY, METERED RESPIRATORY (INHALATION) DAILY
Qty: 1 | Refills: 5 | Status: DISCONTINUED | COMMUNITY
Start: 2019-04-29 | End: 2023-06-06

## 2023-06-06 NOTE — LETTER BODY
[Dear  ___] : Dear  [unfilled], [Courtesy Letter:] : I had the pleasure of seeing your patient, [unfilled], in my office today. [Please see my note below.] : Please see my note below. [Sincerely,] : Sincerely, [FreeTextEntry3] : Ed\par \par Joni Martinez MD\par [facility]\par  of Urology\par [employer] [facility]\par

## 2023-06-06 NOTE — HISTORY OF PRESENT ILLNESS
[FreeTextEntry1] : patient presents with repeat free and total PSA\par no significant symptoms. \par no hematuria or frequency or dysuria. \par Appetite good.

## 2023-06-07 ENCOUNTER — APPOINTMENT (OUTPATIENT)
Dept: GASTROENTEROLOGY | Facility: CLINIC | Age: 73
End: 2023-06-07
Payer: MEDICARE

## 2023-06-07 VITALS
OXYGEN SATURATION: 95 % | HEART RATE: 71 BPM | BODY MASS INDEX: 38.92 KG/M2 | DIASTOLIC BLOOD PRESSURE: 76 MMHG | RESPIRATION RATE: 16 BRPM | SYSTOLIC BLOOD PRESSURE: 130 MMHG | HEIGHT: 67 IN | WEIGHT: 248 LBS | TEMPERATURE: 97.6 F

## 2023-06-07 DIAGNOSIS — K57.32 DIVERTICULITIS OF LARGE INTESTINE W/OUT PERFORATION OR ABSCESS W/OUT BLEEDING: ICD-10-CM

## 2023-06-07 PROCEDURE — 99214 OFFICE O/P EST MOD 30 MIN: CPT

## 2023-06-07 RX ORDER — MUPIROCIN 20 MG/G
2 OINTMENT TOPICAL
Qty: 22 | Refills: 0 | Status: DISCONTINUED | COMMUNITY
Start: 2022-09-03 | End: 2023-06-07

## 2023-06-07 NOTE — HISTORY OF PRESENT ILLNESS
[FreeTextEntry1] : Patient is a 72 year old male, with PMH of COPD, ILD, DM, HTN, HLD who presents for follow up after recent ER visit. \par \par Pt states he was having rectal bleeding and LLQ pain, went to [facility] ER and found to have possible diverticulitis vs cystitis. He was treated with Augmentin x 10 days. He states pain has since resolved. This is his 3rd episode of diverticulitis. He has a h/o diverticulitis many years ago. His last colonoscopy was >20 years ago, no family h/o colon cancer or colon polyps. \par \par Overall, BMs are q3days and usually loose, watery when he has a BM. Positive FIT test in April 2023. \par \par Pt planned to have surgical excision of mass in right thigh but this was cancelled bc he was not cleared by pulmonology due to worsening PFTs. \par \par Patient denies any significant cardiac or pulmonary conditions. \par \par Patient denies pyrosis, dysphagia, abdominal pain, change in BMs, rectal bleeding, nausea, vomiting, or unexplained weight loss.\par  [de-identified] : \par \par \par ACC: [id number]     EXAM:  CT ABDOMEN AND PELVIS IC   ORDERED BY: PATSY MEJÍA\par \par PROCEDURE DATE:  05/07/2023\par \par \par \par INTERPRETATION:  CLINICAL INFORMATION: Lower abdominal pain.\par \par COMPARISON: 1/24/2023.\par \par CONTRAST/COMPLICATIONS:\par IV Contrast: Omnipaque 350  90 cc administered   10 cc discarded\par Oral Contrast: NONE\par Complications: None reported at time of study completion\par \par PROCEDURE:\par CT of the Abdomen and Pelvis was performed.\par Sagittal and coronal reformats were performed.\par \par FINDINGS:\par LOWER CHEST: Fibrosis at the lung bases, unchanged. Calcifications at the aortic valve leaflets.\par \par LIVER: Hepatic steatosis.\par BILE DUCTS: Normal caliber.\par GALLBLADDER: Within normal limits.\par SPLEEN: Within normal limits.\par PANCREAS: Within normal limits.\par ADRENALS: Within normal limits.\par KIDNEYS/URETERS: Kidneys enhance symmetrically without hydronephrosis. Bilateral renal cysts and subcentimeter low-attenuation lesions that are too small to characterize. Mild nonspecific bilateral perinephric stranding.\par \par BLADDER: Distended.\par REPRODUCTIVE ORGANS: Prostate gland is mildly enlarged with median hypertrophy.\par \par BOWEL: No bowel obstruction or overt bowel wall thickening. Appendix is not visualized. Colonic diverticulosis. Slight infiltrative changes of the mesenteric fat adjacent to the fundus of the bladder and mid sigmoid colon.\par PERITONEUM: No ascites, pneumoperitoneum, or loculated collection. No mesenteric lymphadenopathy.\par VESSELS: Atherosclerotic calcifications of the aortoiliac tree. Normal caliber abdominal aorta.\par RETROPERITONEUM/LYMPH NODES: No lymphadenopathy.\par ABDOMINAL WALL: Small fat-containing umbilical hernia and left inguinal hernia.\par BONES: Degenerative changes of the spine.\par \par IMPRESSION:\par Distended urinary bladder. Colonic diverticulosis. Slight infiltrative changes of the mesenteric fat adjacent to the fundus of the bladder and mid sigmoid colon which may be reflective of a cystitis or mild uncomplicated diverticulitis.\par \par \par \par --- End of Report ---\par \par \par \par \par \par MARLENA SMITH DO; Attending Radiologist\par This document has been electronically signed. May  7 2023 10:40PM\par

## 2023-06-07 NOTE — ASSESSMENT
[FreeTextEntry1] : Patient is a 72 year old male, with PMH of COPD, ILD, DM, HTN, HLD who presents for follow up after recent ER visit for uncomplicated diverticulitis on 5/7/23. Completed antibiotics, symptoms resolved. H/O diverticulitis x 2 in the past, this is his 3rd episode.\par \par Pt will need colonoscopy but will not schedule until he is cleared by pulmonology. When we plan for colonoscopy, will arrange for procedure to be done at [facility]. \par \par I, Dr. Ruth Alonzo, was present during the history and physical\par She was seen during last admission for rectal bleeding.\par CAT scan showed diverticulitis versus cystitis.  Reviewed the images.  He was treated with antibiotics and rectal bleeding resolved\par  he still has continued constipation.\par Abdominal exam positive bowel soft nontender\par \par Assessment and plan\par Possible mild diverticulitis.\par Answered patient high-fiber diet/MiraLAX as needed\par We will schedule patient for colonoscopy when cleared from a pulmonary standpoint.  I agree with the above assessment and plan\par

## 2023-06-07 NOTE — ADDENDUM
[FreeTextEntry1] : I, Deidra Moore PA-C, acted as a scribe for the services dictated to me by REJI Sevilla in this document on Jun 07, 2023 for LIA Esparza\par

## 2023-06-09 ENCOUNTER — OUTPATIENT (OUTPATIENT)
Dept: OUTPATIENT SERVICES | Facility: HOSPITAL | Age: 73
LOS: 1 days | End: 2023-06-09

## 2023-06-09 ENCOUNTER — APPOINTMENT (OUTPATIENT)
Dept: CT IMAGING | Facility: CLINIC | Age: 73
End: 2023-06-09
Payer: MEDICARE

## 2023-06-09 DIAGNOSIS — Z90.49 ACQUIRED ABSENCE OF OTHER SPECIFIED PARTS OF DIGESTIVE TRACT: Chronic | ICD-10-CM

## 2023-06-09 DIAGNOSIS — J84.9 INTERSTITIAL PULMONARY DISEASE, UNSPECIFIED: ICD-10-CM

## 2023-06-09 DIAGNOSIS — Z98.890 OTHER SPECIFIED POSTPROCEDURAL STATES: Chronic | ICD-10-CM

## 2023-06-09 PROCEDURE — 71250 CT THORAX DX C-: CPT | Mod: 26

## 2023-06-12 ENCOUNTER — APPOINTMENT (OUTPATIENT)
Dept: SURGERY | Facility: AMBULATORY SURGERY CENTER | Age: 73
End: 2023-06-12

## 2023-06-15 ENCOUNTER — APPOINTMENT (OUTPATIENT)
Dept: PULMONOLOGY | Facility: CLINIC | Age: 73
End: 2023-06-15
Payer: MEDICARE

## 2023-06-15 VITALS — OXYGEN SATURATION: 95 %

## 2023-06-15 VITALS — HEART RATE: 71 BPM | RESPIRATION RATE: 16 BRPM | OXYGEN SATURATION: 94 %

## 2023-06-15 VITALS — SYSTOLIC BLOOD PRESSURE: 120 MMHG | DIASTOLIC BLOOD PRESSURE: 60 MMHG | WEIGHT: 246 LBS | BODY MASS INDEX: 38.53 KG/M2

## 2023-06-15 DIAGNOSIS — G47.33 OBSTRUCTIVE SLEEP APNEA (ADULT) (PEDIATRIC): ICD-10-CM

## 2023-06-15 DIAGNOSIS — Z01.811 ENCOUNTER FOR PREPROCEDURAL RESPIRATORY EXAMINATION: ICD-10-CM

## 2023-06-15 PROCEDURE — 99214 OFFICE O/P EST MOD 30 MIN: CPT | Mod: 25

## 2023-06-15 PROCEDURE — 94010 BREATHING CAPACITY TEST: CPT

## 2023-06-15 RX ORDER — FLUTICASONE FUROATE, UMECLIDINIUM BROMIDE AND VILANTEROL TRIFENATATE 100; 62.5; 25 UG/1; UG/1; UG/1
100-62.5-25 POWDER RESPIRATORY (INHALATION)
Qty: 1 | Refills: 0 | Status: COMPLETED | COMMUNITY
Start: 2023-06-06 | End: 2023-06-15

## 2023-06-15 NOTE — DISCUSSION/SUMMARY
[FreeTextEntry1] : COPD Gold grade III complicated by interstitial lung disease component /clinical UIP\par connective tissue serologies are negative, was tolerating pirfenidone but denies benefit and stopped, saw second opinion who felt more asbestosis related, pt getting legal assistance for that \par My review of pulmonary ILD with areas of ossification were less clear and included favor UIP\par Has refused retrial of anti fibrotic\par Clinically back to baseline, spirometry much improved- will change to Anoro daily and samples given\par CT chest 6/23 stable UIP like pattern from 6/20\par Obesity with moderate  obstructive sleep apnea - ( AHI 20) intolerant to CPAP , not using oral appliance\par No pulmonary contraindications to surgical/GI procedure, at increased risk of post operative pulmonary complications, risk/benefit favors\par Anoro- AM of procedure, Duo neb q 4-6 hrs\par Incentive spirometry, DVT prophylaxis\par Pt advised to bring in oral appliance, but has been on Cpap 11- which should be used post operatively\par Will follow in 3 months

## 2023-06-15 NOTE — CONSULT LETTER
[Dear  ___] : Dear  [unfilled], [Consult Letter:] : I had the pleasure of evaluating your patient, [unfilled]. [Please see my note below.] : Please see my note below. [Consult Closing:] : Thank you very much for allowing me to participate in the care of this patient.  If you have any questions, please do not hesitate to contact me. [Sincerely,] : Sincerely, [DrIsaias  ___] : Dr. CHAPA [[facility]ede Wilkinson DO, [facility]] : Fede Wilkinson DO, [facility] [Director, Respiratory Care] : Director, Respiratory Care [[facility]] : [facility] [FreeTextEntry3] : Feed Wilkinson DO [facility]\par Pulmonary Critical Care\par Director Pulmonary Division\par Medical Director Respiratory Therapy\par [facility]\par \par

## 2023-06-15 NOTE — HISTORY OF PRESENT ILLNESS
[Former] : former [TextBox_4] : Feels better\par less dyspnea\par no cp or sputum\par Problems with Trelegy- sore throat\par for surgery R thigh growth\par just finished abx for diverticulitis\par  [YearQuit] : 2000 [FreeTextEntry1] : \par

## 2023-06-15 NOTE — PROCEDURE
[FreeTextEntry1] : CT scan\par 6/9/23- peripheral /basilar reticular with traction bronchiolectasis from 11/22, 10/21,6/20\par Spirometry today  improved , mild obstruction, sig improved FVC and FEV1 from 6/1/23\par

## 2023-06-22 DIAGNOSIS — R19.5 OTHER FECAL ABNORMALITIES: ICD-10-CM

## 2023-06-22 RX ORDER — TIOTROPIUM BROMIDE AND OLODATEROL 3.124; 2.736 UG/1; UG/1
2.5-2.5 SPRAY, METERED RESPIRATORY (INHALATION)
Qty: 3 | Refills: 3 | Status: ACTIVE | COMMUNITY
Start: 2023-06-22 | End: 1900-01-01

## 2023-06-22 RX ORDER — UMECLIDINIUM BROMIDE AND VILANTEROL TRIFENATATE 62.5; 25 UG/1; UG/1
62.5-25 POWDER RESPIRATORY (INHALATION) DAILY
Qty: 3 | Refills: 2 | Status: DISCONTINUED | COMMUNITY
Start: 2023-06-15 | End: 2023-06-22

## 2023-06-28 PROBLEM — R19.5 POSITIVE FIT (FECAL IMMUNOCHEMICAL TEST): Status: ACTIVE | Noted: 2023-05-03

## 2023-07-13 ENCOUNTER — OUTPATIENT (OUTPATIENT)
Dept: OUTPATIENT SERVICES | Facility: HOSPITAL | Age: 73
LOS: 1 days | End: 2023-07-13
Payer: MEDICARE

## 2023-07-13 DIAGNOSIS — Z98.890 OTHER SPECIFIED POSTPROCEDURAL STATES: Chronic | ICD-10-CM

## 2023-07-13 DIAGNOSIS — Z90.49 ACQUIRED ABSENCE OF OTHER SPECIFIED PARTS OF DIGESTIVE TRACT: Chronic | ICD-10-CM

## 2023-07-13 DIAGNOSIS — Z01.818 ENCOUNTER FOR OTHER PREPROCEDURAL EXAMINATION: ICD-10-CM

## 2023-07-13 LAB
A1C WITH ESTIMATED AVERAGE GLUCOSE RESULT: 6.3 % — HIGH (ref 4–5.6)
ANION GAP SERPL CALC-SCNC: 11 MMOL/L — SIGNIFICANT CHANGE UP (ref 5–17)
APTT BLD: 29 SEC — SIGNIFICANT CHANGE UP (ref 27.5–35.5)
BASOPHILS # BLD AUTO: 0.06 K/UL — SIGNIFICANT CHANGE UP (ref 0–0.2)
BASOPHILS NFR BLD AUTO: 0.5 % — SIGNIFICANT CHANGE UP (ref 0–2)
BUN SERPL-MCNC: 22.8 MG/DL — HIGH (ref 8–20)
CALCIUM SERPL-MCNC: 9.7 MG/DL — SIGNIFICANT CHANGE UP (ref 8.4–10.5)
CHLORIDE SERPL-SCNC: 101 MMOL/L — SIGNIFICANT CHANGE UP (ref 96–108)
CO2 SERPL-SCNC: 26 MMOL/L — SIGNIFICANT CHANGE UP (ref 22–29)
CREAT SERPL-MCNC: 1.49 MG/DL — HIGH (ref 0.5–1.3)
EGFR: 49 ML/MIN/1.73M2 — LOW
EOSINOPHIL # BLD AUTO: 0.22 K/UL — SIGNIFICANT CHANGE UP (ref 0–0.5)
EOSINOPHIL NFR BLD AUTO: 1.8 % — SIGNIFICANT CHANGE UP (ref 0–6)
ESTIMATED AVERAGE GLUCOSE: 134 MG/DL — HIGH (ref 68–114)
GLUCOSE SERPL-MCNC: 92 MG/DL — SIGNIFICANT CHANGE UP (ref 70–99)
HCT VFR BLD CALC: 41.2 % — SIGNIFICANT CHANGE UP (ref 39–50)
HGB BLD-MCNC: 12.9 G/DL — LOW (ref 13–17)
IMM GRANULOCYTES NFR BLD AUTO: 0.5 % — SIGNIFICANT CHANGE UP (ref 0–0.9)
INR BLD: 1.09 RATIO — SIGNIFICANT CHANGE UP (ref 0.88–1.16)
LYMPHOCYTES # BLD AUTO: 17.8 % — SIGNIFICANT CHANGE UP (ref 13–44)
LYMPHOCYTES # BLD AUTO: 2.24 K/UL — SIGNIFICANT CHANGE UP (ref 1–3.3)
MCHC RBC-ENTMCNC: 29.4 PG — SIGNIFICANT CHANGE UP (ref 27–34)
MCHC RBC-ENTMCNC: 31.3 GM/DL — LOW (ref 32–36)
MCV RBC AUTO: 93.8 FL — SIGNIFICANT CHANGE UP (ref 80–100)
MONOCYTES # BLD AUTO: 0.85 K/UL — SIGNIFICANT CHANGE UP (ref 0–0.9)
MONOCYTES NFR BLD AUTO: 6.8 % — SIGNIFICANT CHANGE UP (ref 2–14)
NEUTROPHILS # BLD AUTO: 9.13 K/UL — HIGH (ref 1.8–7.4)
NEUTROPHILS NFR BLD AUTO: 72.6 % — SIGNIFICANT CHANGE UP (ref 43–77)
PLATELET # BLD AUTO: 309 K/UL — SIGNIFICANT CHANGE UP (ref 150–400)
POTASSIUM SERPL-MCNC: 4.6 MMOL/L — SIGNIFICANT CHANGE UP (ref 3.5–5.3)
POTASSIUM SERPL-SCNC: 4.6 MMOL/L — SIGNIFICANT CHANGE UP (ref 3.5–5.3)
PROTHROM AB SERPL-ACNC: 12.6 SEC — SIGNIFICANT CHANGE UP (ref 10.5–13.4)
RBC # BLD: 4.39 M/UL — SIGNIFICANT CHANGE UP (ref 4.2–5.8)
RBC # FLD: 14 % — SIGNIFICANT CHANGE UP (ref 10.3–14.5)
SODIUM SERPL-SCNC: 138 MMOL/L — SIGNIFICANT CHANGE UP (ref 135–145)
WBC # BLD: 12.56 K/UL — HIGH (ref 3.8–10.5)
WBC # FLD AUTO: 12.56 K/UL — HIGH (ref 3.8–10.5)

## 2023-07-13 PROCEDURE — 85025 COMPLETE CBC W/AUTO DIFF WBC: CPT

## 2023-07-13 PROCEDURE — G0463: CPT

## 2023-07-13 PROCEDURE — 85610 PROTHROMBIN TIME: CPT

## 2023-07-13 PROCEDURE — 85730 THROMBOPLASTIN TIME PARTIAL: CPT

## 2023-07-13 PROCEDURE — 36415 COLL VENOUS BLD VENIPUNCTURE: CPT

## 2023-07-13 PROCEDURE — 80048 BASIC METABOLIC PNL TOTAL CA: CPT

## 2023-07-13 PROCEDURE — 83036 HEMOGLOBIN GLYCOSYLATED A1C: CPT

## 2023-07-13 PROCEDURE — 93010 ELECTROCARDIOGRAM REPORT: CPT

## 2023-07-13 PROCEDURE — 93005 ELECTROCARDIOGRAM TRACING: CPT

## 2023-07-21 ENCOUNTER — APPOINTMENT (OUTPATIENT)
Dept: FAMILY MEDICINE | Facility: CLINIC | Age: 73
End: 2023-07-21
Payer: MEDICARE

## 2023-07-21 VITALS
SYSTOLIC BLOOD PRESSURE: 121 MMHG | WEIGHT: 243 LBS | DIASTOLIC BLOOD PRESSURE: 73 MMHG | HEART RATE: 76 BPM | HEIGHT: 67 IN | BODY MASS INDEX: 38.14 KG/M2 | OXYGEN SATURATION: 98 %

## 2023-07-21 DIAGNOSIS — Z01.818 ENCOUNTER FOR OTHER PREPROCEDURAL EXAMINATION: ICD-10-CM

## 2023-07-21 PROCEDURE — 99214 OFFICE O/P EST MOD 30 MIN: CPT

## 2023-07-21 RX ORDER — PEG-3350, SODIUM SULFATE, SODIUM CHLORIDE, POTASSIUM CHLORIDE, SODIUM ASCORBATE AND ASCORBIC ACID 7.5-2.691G
100 KIT ORAL
Qty: 1 | Refills: 0 | Status: DISCONTINUED | COMMUNITY
Start: 2023-06-30 | End: 2023-07-21

## 2023-07-21 NOTE — ASSESSMENT
[Patient Optimized for Surgery] : Patient optimized for surgery [FreeTextEntry4] : Preop labs reviewed and are stable\par EKG: NSR, No ST or T wave abnormalities compared to prior\par \par Patient to avoid all NSAIDs, fish oil and other herbal supplements 5 to 7 days prior to procedure.\par \par Patient is intermediate risk for planned procedure. Patient is medically optimized at the time of this visit with no modifiable risk factors. Patient to receive cardiac clearance from Dr. Villalta

## 2023-07-21 NOTE — REVIEW OF SYSTEMS
[Fever] : no fever [Chills] : no chills [Chest Pain] : no chest pain [Palpitations] : no palpitations [Abdominal Pain] : no abdominal pain [Nausea] : no nausea [Vomiting] : no vomiting [Headache] : no headache [Dizziness] : no dizziness

## 2023-07-21 NOTE — HEALTH RISK ASSESSMENT
Static Sitting: GOOD at EOB, SBA (15 min for endurance and upright positioning)  Dynamic Sitting:GOOD (-) at EOB  Static Standing: NT  Dynamic Standing:NT    Only agreeable to EOB with max encouragement, \"I have this diarrhea and can't do much right now\"    Bed mobility:    Supine to sit: MOD A x 2  Sit to supine: MIN A  Scooting to head of bed: NT  Scooting in sitting: CGA seated scoot  Rolling: MIN A  Bridging: NT    Transfers:    Did not tolerate OOB this date    Dressing:   NT    Bathing:  NT    Eating:  NT    Positioning Needs: left in bed with needs in reach and nursing aware of status    Exercise / Activities Initiated: Encouraged upright posture, varying position    Patient/Family Education: role of OT,importance of mobility/OOB    Assessment of Deficits:  Self Care [id number]  Pt demonstrated decreased activity tolerance, decreased safety awareness, decreased strength, decreased ROM, decreased balance, and decreased bed mobility, transfers, dressing, and bathing. Patient functioning below baseline and will likely benefit from skilled OT to maximize safety and independence. Pt. Limited during evaluation by Jn Killian .fatigue     . At end of evaluation, pt. In bed, with call light and needs within reach. Goal(s) :   Self Care [id number]  To be met in 3 Visits:  1). Indep with UE ex x 10 reps    To be met in 5 Visits:  1). Supine to Sit MIN A  2). Bed to Chair/BSC MIN A  3). Upper Body Bathing MIN A  4). Lower Body Bathing MOD A  5). Upper Body Dressing MIN A  6). Lower Body Dressing MOD A    Rehabilitation Potential:  Good for goals listed above. Strengths for achieving goals include: PLOF, motivation  Barriers to achieving goals include: fatigue, medical status     Plan: To be seen 5x/week while in acute care setting for therapeutic exercises, bed mobility, transfers, dressing, bathing,family/patient education with adaptive equipment, breathing technique instruction.      Timed Code Treatment Minutes: [Former] : Former [20 or more] : 20 or more [de-identified] : quit 35 years ago

## 2023-07-21 NOTE — HISTORY OF PRESENT ILLNESS
[COPD] : COPD [Sleep Apnea] : sleep apnea [Aortic Stenosis] : no aortic stenosis [Atrial Fibrillation] : no atrial fibrillation [Coronary Artery Disease] : no coronary artery disease [Recent Myocardial Infarction] : no recent myocardial infarction [Implantable Device/Pacemaker] : no implantable device/pacemaker [Asthma] : no asthma [Smoker] : not a smoker [Chronic Anticoagulation] : no chronic anticoagulation [Chronic Kidney Disease] : no chronic kidney disease [Diabetes] : diabetes [FreeTextEntry4] : 73-year-old male with past medical history of type 2 diabetes, hyperlipidemia, hypertension, COPD presents for preop evaluation.  Patient will be having a right upper thigh lipoma removal on Monday 7/24/23 with Dr. Jimenez at [facility].  [FreeTextEntry6] : Denies chest pain, palpitations. Denies any chest pain on exertion. Admits to some shortness of breath due to history of copd [FreeTextEntry7] : EKG: NSR, no ST or T wave changes compared to prior

## 2023-07-24 ENCOUNTER — RESULT REVIEW (OUTPATIENT)
Age: 73
End: 2023-07-24

## 2023-07-24 ENCOUNTER — APPOINTMENT (OUTPATIENT)
Dept: SURGERY | Facility: AMBULATORY SURGERY CENTER | Age: 73
End: 2023-07-24
Payer: MEDICARE

## 2023-07-24 PROCEDURE — 27339 EXC THIGH/KNEE TUM DEP 5CM/>: CPT | Mod: RT

## 2023-07-28 ENCOUNTER — TRANSCRIPTION ENCOUNTER (OUTPATIENT)
Age: 73
End: 2023-07-28

## 2023-07-28 ENCOUNTER — OUTPATIENT (OUTPATIENT)
Dept: OUTPATIENT SERVICES | Facility: HOSPITAL | Age: 73
LOS: 1 days | End: 2023-07-28
Payer: MEDICARE

## 2023-07-28 ENCOUNTER — RESULT REVIEW (OUTPATIENT)
Age: 73
End: 2023-07-28

## 2023-07-28 ENCOUNTER — APPOINTMENT (OUTPATIENT)
Dept: GASTROENTEROLOGY | Facility: HOSPITAL | Age: 73
End: 2023-07-28

## 2023-07-28 DIAGNOSIS — Z98.890 OTHER SPECIFIED POSTPROCEDURAL STATES: Chronic | ICD-10-CM

## 2023-07-28 DIAGNOSIS — Z90.49 ACQUIRED ABSENCE OF OTHER SPECIFIED PARTS OF DIGESTIVE TRACT: Chronic | ICD-10-CM

## 2023-07-28 DIAGNOSIS — R19.5 OTHER FECAL ABNORMALITIES: ICD-10-CM

## 2023-07-28 LAB — GLUCOSE BLDC GLUCOMTR-MCNC: 104 MG/DL — HIGH (ref 70–99)

## 2023-07-28 PROCEDURE — 45378 DIAGNOSTIC COLONOSCOPY: CPT

## 2023-07-28 PROCEDURE — 88342 IMHCHEM/IMCYTCHM 1ST ANTB: CPT

## 2023-07-28 PROCEDURE — 82962 GLUCOSE BLOOD TEST: CPT

## 2023-07-28 PROCEDURE — 43239 EGD BIOPSY SINGLE/MULTIPLE: CPT | Mod: 59

## 2023-07-28 PROCEDURE — 88342 IMHCHEM/IMCYTCHM 1ST ANTB: CPT | Mod: 26

## 2023-07-28 PROCEDURE — 88305 TISSUE EXAM BY PATHOLOGIST: CPT | Mod: 26

## 2023-07-28 PROCEDURE — 88305 TISSUE EXAM BY PATHOLOGIST: CPT

## 2023-07-28 PROCEDURE — 45385 COLONOSCOPY W/LESION REMOVAL: CPT

## 2023-07-28 PROCEDURE — 43239 EGD BIOPSY SINGLE/MULTIPLE: CPT

## 2023-07-28 DEVICE — NAIL OSTEO 1.5X16MM STRL: Type: IMPLANTABLE DEVICE | Status: FUNCTIONAL

## 2023-07-28 NOTE — ASSESSMENT
[FreeTextEntry1] : A/P\par occult + stool, diverticulosis \par  I discussed the risks and benefits of colonoscopy and patient was given opportunity to ask questions. Colonoscopy to r/o colon cancer, polyps, AVM's. Patient is medically optimized for the procedure\par \par \par  I discussed the risks and benefits of EGD and patient was given opportunity to ask questions. EGD to r/o Stern's  esophagus, PUD, mass, AVM'S. Pt is medically optimized for EGD\par

## 2023-07-28 NOTE — REASON FOR VISIT
[Follow-up] : a follow-up of an existing diagnosis [FreeTextEntry1] : diverticullitis, occult + stool

## 2023-08-03 LAB — SURGICAL PATHOLOGY STUDY: SIGNIFICANT CHANGE UP

## 2023-08-10 ENCOUNTER — APPOINTMENT (OUTPATIENT)
Dept: SURGERY | Facility: CLINIC | Age: 73
End: 2023-08-10
Payer: MEDICARE

## 2023-08-10 VITALS
HEIGHT: 67 IN | BODY MASS INDEX: 38.45 KG/M2 | WEIGHT: 245 LBS | RESPIRATION RATE: 16 BRPM | HEART RATE: 67 BPM | DIASTOLIC BLOOD PRESSURE: 75 MMHG | OXYGEN SATURATION: 95 % | SYSTOLIC BLOOD PRESSURE: 122 MMHG | TEMPERATURE: 97.3 F

## 2023-08-10 PROCEDURE — 99024 POSTOP FOLLOW-UP VISIT: CPT

## 2023-08-13 NOTE — ASSESSMENT
LVM for patient to callback to schedule appointment  
[FreeTextEntry1] : Mr. REA  is a 73 year old man status post excision of right thigh mass, doing well

## 2023-08-13 NOTE — PHYSICAL EXAM
[JVD] : no jugular venous distention  [de-identified] : No acute distress [de-identified] : No respiratory distress [de-identified] : Regular rate [de-identified] : soft, nontender. no rebound or guarding. [de-identified] : right thigh incision c/d/i

## 2023-08-13 NOTE — HISTORY OF PRESENT ILLNESS
[de-identified] : Mr. ÁLVARO GATES is a 73 year old man status post excision of right thigh mass who comes in today for postop visit. He is doing well. Denies pain. Denies fever/chills. No redness or pain at incision site. Tolerating diet. Normal bowel movements.  Pathology pending

## 2023-08-21 ENCOUNTER — APPOINTMENT (OUTPATIENT)
Dept: FAMILY MEDICINE | Facility: CLINIC | Age: 73
End: 2023-08-21
Payer: MEDICARE

## 2023-08-21 VITALS
OXYGEN SATURATION: 94 % | WEIGHT: 244 LBS | HEIGHT: 67 IN | RESPIRATION RATE: 16 BRPM | HEART RATE: 66 BPM | BODY MASS INDEX: 38.3 KG/M2 | DIASTOLIC BLOOD PRESSURE: 70 MMHG | SYSTOLIC BLOOD PRESSURE: 146 MMHG

## 2023-08-21 DIAGNOSIS — H53.9 UNSPECIFIED VISUAL DISTURBANCE: ICD-10-CM

## 2023-08-21 PROCEDURE — 99214 OFFICE O/P EST MOD 30 MIN: CPT | Mod: 25

## 2023-08-21 PROCEDURE — 36415 COLL VENOUS BLD VENIPUNCTURE: CPT

## 2023-08-21 NOTE — ASSESSMENT
[FreeTextEntry1] : DM2-repeat A1c today, continue metformin 500 mg twice daily for now Hyperlipidemia-continue atorvastatin 40 mg daily, repeat lipid panel today Hypertension-acceptable for age, continue losartan 25 mg daily Visual changes-referral to retinal specialist given, as requested by optometrist Anxiety-denies symptoms of depression, states he is not feeling much of a difference in regards to anxiety on the Lexapro 10 mg.  Will increase to 20 mg daily and-have him follow-up in 2 months for reevaluation. RTC in 2 months

## 2023-08-21 NOTE — HISTORY OF PRESENT ILLNESS
[FreeTextEntry1] : 3 month check up  [de-identified] : 73-year-old male with past medical history of type 2 diabetes, hyperlipidemia, hypertension, COPD who presents today for follow-up.  Since since his last visit, he had a right upper thigh lipoma removed last month, states it went well.   For diabetes, last A1c 6.5% on 4/28/2023.  Currently takes metformin 500 mg twice daily.  For hypertension, he takes losartan 25 mg daily.  For hyperlipidemia he takes atorvastatin 40 mg daily.  He is still taking the esctialopram 10 mg daily but is not sure that this is making a difference for his anxiety.  Recently hadcolonoscopy - 7/28/23, told to repeat in 5 years with Dr. 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  He was told that the diabetes was okay but was told to see a retinal specialist.

## 2023-08-22 LAB
ALBUMIN SERPL ELPH-MCNC: 4.4 G/DL
ALP BLD-CCNC: 94 U/L
ALT SERPL-CCNC: 14 U/L
ANION GAP SERPL CALC-SCNC: 12 MMOL/L
AST SERPL-CCNC: 13 U/L
BILIRUB SERPL-MCNC: 0.6 MG/DL
BUN SERPL-MCNC: 15 MG/DL
CALCIUM SERPL-MCNC: 9.6 MG/DL
CHLORIDE SERPL-SCNC: 107 MMOL/L
CHOLEST SERPL-MCNC: 126 MG/DL
CO2 SERPL-SCNC: 25 MMOL/L
CREAT SERPL-MCNC: 1.46 MG/DL
EGFR: 50 ML/MIN/1.73M2
ESTIMATED AVERAGE GLUCOSE: 140 MG/DL
GLUCOSE SERPL-MCNC: 108 MG/DL
HBA1C MFR BLD HPLC: 6.5 %
HDLC SERPL-MCNC: 41 MG/DL
LDLC SERPL CALC-MCNC: 65 MG/DL
NONHDLC SERPL-MCNC: 85 MG/DL
POTASSIUM SERPL-SCNC: 5.1 MMOL/L
PROT SERPL-MCNC: 6.8 G/DL
SODIUM SERPL-SCNC: 145 MMOL/L
TRIGL SERPL-MCNC: 109 MG/DL

## 2023-09-14 ENCOUNTER — APPOINTMENT (OUTPATIENT)
Dept: PULMONOLOGY | Facility: CLINIC | Age: 73
End: 2023-09-14
Payer: MEDICARE

## 2023-09-14 VITALS
SYSTOLIC BLOOD PRESSURE: 130 MMHG | RESPIRATION RATE: 16 BRPM | BODY MASS INDEX: 38.45 KG/M2 | HEART RATE: 66 BPM | OXYGEN SATURATION: 95 % | HEIGHT: 67 IN | WEIGHT: 245 LBS | DIASTOLIC BLOOD PRESSURE: 62 MMHG

## 2023-09-14 PROCEDURE — 99213 OFFICE O/P EST LOW 20 MIN: CPT

## 2023-10-14 ENCOUNTER — NON-APPOINTMENT (OUTPATIENT)
Age: 73
End: 2023-10-14

## 2023-10-20 ENCOUNTER — OFFICE (OUTPATIENT)
Dept: URBAN - METROPOLITAN AREA CLINIC 63 | Facility: CLINIC | Age: 73
Setting detail: OPHTHALMOLOGY
End: 2023-10-20
Payer: MEDICARE

## 2023-10-20 DIAGNOSIS — D31.32: ICD-10-CM

## 2023-10-20 DIAGNOSIS — E11.9: ICD-10-CM

## 2023-10-20 PROCEDURE — 92134 CPTRZ OPH DX IMG PST SGM RTA: CPT | Performed by: SPECIALIST

## 2023-10-20 PROCEDURE — 92014 COMPRE OPH EXAM EST PT 1/>: CPT | Performed by: SPECIALIST

## 2023-10-20 PROCEDURE — 92235 FLUORESCEIN ANGRPH MLTIFRAME: CPT | Performed by: SPECIALIST

## 2023-10-20 ASSESSMENT — PACHYMETRY
OD_CT_UM: 588
OD_CT_CORRECTION: -3
OS_CT_CORRECTION: -3
OS_CT_UM: 583

## 2023-10-20 ASSESSMENT — LID EXAM ASSESSMENTS
OD_BLEPHARITIS: RLL RUL 2+
OS_BLEPHARITIS: LLL LUL 2+

## 2023-10-20 ASSESSMENT — CONFRONTATIONAL VISUAL FIELD TEST (CVF)
OD_FINDINGS: FULL
OS_FINDINGS: FULL

## 2023-10-20 ASSESSMENT — TONOMETRY
OS_IOP_MMHG: 20
OD_IOP_MMHG: 19

## 2023-10-20 ASSESSMENT — VISUAL ACUITY
OD_BCVA: 20/25-1
OS_BCVA: 20/20

## 2023-10-26 ENCOUNTER — APPOINTMENT (OUTPATIENT)
Dept: FAMILY MEDICINE | Facility: CLINIC | Age: 73
End: 2023-10-26
Payer: MEDICARE

## 2023-10-26 VITALS
WEIGHT: 246 LBS | OXYGEN SATURATION: 93 % | SYSTOLIC BLOOD PRESSURE: 132 MMHG | TEMPERATURE: 97.1 F | HEART RATE: 68 BPM | BODY MASS INDEX: 38.53 KG/M2 | DIASTOLIC BLOOD PRESSURE: 70 MMHG | RESPIRATION RATE: 14 BRPM

## 2023-10-26 PROCEDURE — 99214 OFFICE O/P EST MOD 30 MIN: CPT | Mod: 25

## 2023-10-26 PROCEDURE — 36415 COLL VENOUS BLD VENIPUNCTURE: CPT

## 2023-10-27 LAB
ANION GAP SERPL CALC-SCNC: 11 MMOL/L
BUN SERPL-MCNC: 18 MG/DL
CALCIUM SERPL-MCNC: 10.1 MG/DL
CHLORIDE SERPL-SCNC: 105 MMOL/L
CO2 SERPL-SCNC: 27 MMOL/L
CREAT SERPL-MCNC: 1.5 MG/DL
EGFR: 48 ML/MIN/1.73M2
ESTIMATED AVERAGE GLUCOSE: 126 MG/DL
GLUCOSE SERPL-MCNC: 107 MG/DL
HBA1C MFR BLD HPLC: 6 %
POTASSIUM SERPL-SCNC: 5.1 MMOL/L
SODIUM SERPL-SCNC: 143 MMOL/L

## 2023-12-12 ENCOUNTER — APPOINTMENT (OUTPATIENT)
Dept: FAMILY MEDICINE | Facility: CLINIC | Age: 73
End: 2023-12-12
Payer: MEDICARE

## 2023-12-12 VITALS
HEART RATE: 82 BPM | WEIGHT: 247.6 LBS | OXYGEN SATURATION: 94 % | DIASTOLIC BLOOD PRESSURE: 64 MMHG | BODY MASS INDEX: 38.78 KG/M2 | SYSTOLIC BLOOD PRESSURE: 138 MMHG | RESPIRATION RATE: 14 BRPM | TEMPERATURE: 97.1 F

## 2023-12-12 DIAGNOSIS — I10 ESSENTIAL (PRIMARY) HYPERTENSION: ICD-10-CM

## 2023-12-12 PROCEDURE — 99214 OFFICE O/P EST MOD 30 MIN: CPT

## 2023-12-12 RX ORDER — ESCITALOPRAM OXALATE 10 MG/1
10 TABLET ORAL
Qty: 90 | Refills: 0 | Status: DISCONTINUED | COMMUNITY
Start: 2023-10-15 | End: 2023-12-12

## 2024-01-05 ENCOUNTER — APPOINTMENT (OUTPATIENT)
Dept: PULMONOLOGY | Facility: CLINIC | Age: 74
End: 2024-01-05
Payer: MEDICARE

## 2024-01-05 VITALS — OXYGEN SATURATION: 94 % | RESPIRATION RATE: 16 BRPM | HEART RATE: 61 BPM

## 2024-01-05 VITALS — WEIGHT: 247 LBS | DIASTOLIC BLOOD PRESSURE: 70 MMHG | SYSTOLIC BLOOD PRESSURE: 120 MMHG | BODY MASS INDEX: 38.69 KG/M2

## 2024-01-05 VITALS — OXYGEN SATURATION: 96 %

## 2024-01-05 PROCEDURE — 99213 OFFICE O/P EST LOW 20 MIN: CPT

## 2024-01-05 NOTE — CONSULT LETTER
[Dear  ___] : Dear  [unfilled], [Consult Letter:] : I had the pleasure of evaluating your patient, [unfilled]. [Please see my note below.] : Please see my note below. [Consult Closing:] : Thank you very much for allowing me to participate in the care of this patient.  If you have any questions, please do not hesitate to contact me. [Sincerely,] : Sincerely, [FreeTextEntry3] : Fede Wilkinson DO [facility]\par  Pulmonary Critical Care\par  Director Pulmonary Division\par  Medical Director Respiratory Therapy\par  [facility]\par  \par   [DrIsaias  ___] : Dr. CHAPA [Fede Wilkinson DO, [facility]] : Fede Wilkinson DO, [facility] [Director, Respiratory Care] : Director, Respiratory Care [[facility]] : [facility]

## 2024-01-05 NOTE — DISCUSSION/SUMMARY
[FreeTextEntry1] : COPD Gold grade III complicated by interstitial lung disease component /clinical UIP connective tissue serologies are negative, was tolerating pirfenidone but denies benefit and stopped, saw second opinion who felt more asbestosis related, pt getting legal assistance for that  My review of pulmonary ILD with areas of ossification were less clear and included favor UIP Discussed trial of  anti fibrotic, not interested currently Clinically back to baseline, spirometry much improved- will change to Stiolto daily and samples given CT chest 6/23 stable UIP like pattern from 6/20 Obesity with moderate  obstructive sleep apnea - ( AHI 20) intolerant to CPAP , not using oral appliance Weight loss encouraged vaccinations reviewed 5 months or sooner if needed with zi

## 2024-01-05 NOTE — HISTORY OF PRESENT ILLNESS
[Former] : former [TextBox_4] : Feels better less dyspnea no cp or sputum Stiolto daily no fever, chill, chest pain    [YearQuit] : 2000 [FreeTextEntry1] : \par

## 2024-01-25 ENCOUNTER — APPOINTMENT (OUTPATIENT)
Dept: FAMILY MEDICINE | Facility: CLINIC | Age: 74
End: 2024-01-25

## 2024-01-29 ENCOUNTER — LABORATORY RESULT (OUTPATIENT)
Age: 74
End: 2024-01-29

## 2024-01-29 ENCOUNTER — APPOINTMENT (OUTPATIENT)
Dept: FAMILY MEDICINE | Facility: CLINIC | Age: 74
End: 2024-01-29
Payer: MEDICARE

## 2024-01-29 VITALS
DIASTOLIC BLOOD PRESSURE: 68 MMHG | BODY MASS INDEX: 38.69 KG/M2 | TEMPERATURE: 97.4 F | OXYGEN SATURATION: 94 % | WEIGHT: 247 LBS | SYSTOLIC BLOOD PRESSURE: 112 MMHG | RESPIRATION RATE: 16 BRPM | HEART RATE: 76 BPM

## 2024-01-29 PROCEDURE — 36415 COLL VENOUS BLD VENIPUNCTURE: CPT

## 2024-01-29 PROCEDURE — G2211 COMPLEX E/M VISIT ADD ON: CPT

## 2024-01-29 PROCEDURE — 99214 OFFICE O/P EST MOD 30 MIN: CPT

## 2024-01-29 RX ORDER — CITALOPRAM HYDROBROMIDE 20 MG/1
20 TABLET, FILM COATED ORAL
Qty: 30 | Refills: 1 | Status: DISCONTINUED | COMMUNITY
Start: 2023-10-26 | End: 2024-01-29

## 2024-01-29 NOTE — ASSESSMENT
[FreeTextEntry1] : Anxiety- uncontrolled, will discontinue celexa at this time and start sertraline, patient to take celexa 10mg tonight and then stop the medication and take sertraline 50mg starting tomorrow night, follow up in 1 month  Type 2 diabetes-we will continue metformin 500 mg twice daily Will check hemoglobin A1c today  and albumin/creatinine ratio patient up to date with annual eye exam  HLD - will continue atorvastatin 40mg daily, will check lipid panel and cmp  COPD - continue to follow with pulm, continue stiolto inhaler

## 2024-01-29 NOTE — HISTORY OF PRESENT ILLNESS
[FreeTextEntry1] : Pt is here for three months f/u on cholesterol. [de-identified] : 73-year-old male with past medical history of type 2 diabetes, hyperlipidemia, hypertension, COPD presents for follow-up visit.    Patient has been taking Celexa 20 mg daily for his anxiety. Patient states he has not noticed any improvement in his symptoms.  States that he is still feeling very jumpy.  He is interested in trying a different medication for anxiety.  For type 2 diabetes, patient has been stable on metformin 500 mg twice daily  For HLD, patient currently on atorvastatin 40 mg daily.

## 2024-01-30 LAB
ALBUMIN SERPL ELPH-MCNC: 4.5 G/DL
ALP BLD-CCNC: 105 U/L
ALT SERPL-CCNC: 13 U/L
ANION GAP SERPL CALC-SCNC: 13 MMOL/L
AST SERPL-CCNC: 13 U/L
BASOPHILS # BLD AUTO: 0.09 K/UL
BASOPHILS NFR BLD AUTO: 0.8 %
BILIRUB SERPL-MCNC: 0.9 MG/DL
BUN SERPL-MCNC: 19 MG/DL
CALCIUM SERPL-MCNC: 10 MG/DL
CHLORIDE SERPL-SCNC: 100 MMOL/L
CHOLEST SERPL-MCNC: 134 MG/DL
CO2 SERPL-SCNC: 26 MMOL/L
CREAT SERPL-MCNC: 1.59 MG/DL
CREAT SPEC-SCNC: 408 MG/DL
EGFR: 45 ML/MIN/1.73M2
EOSINOPHIL # BLD AUTO: 0.2 K/UL
EOSINOPHIL NFR BLD AUTO: 1.7 %
ESTIMATED AVERAGE GLUCOSE: 128 MG/DL
GLUCOSE SERPL-MCNC: 88 MG/DL
HBA1C MFR BLD HPLC: 6.1 %
HCT VFR BLD CALC: 43.1 %
HDLC SERPL-MCNC: 39 MG/DL
HGB BLD-MCNC: 13.5 G/DL
IMM GRANULOCYTES NFR BLD AUTO: 0.3 %
LDLC SERPL CALC-MCNC: 73 MG/DL
LYMPHOCYTES # BLD AUTO: 2.23 K/UL
LYMPHOCYTES NFR BLD AUTO: 19.3 %
MAN DIFF?: NORMAL
MCHC RBC-ENTMCNC: 30.1 PG
MCHC RBC-ENTMCNC: 31.3 GM/DL
MCV RBC AUTO: 96.2 FL
MICROALBUMIN 24H UR DL<=1MG/L-MCNC: 4.3 MG/DL
MICROALBUMIN/CREAT 24H UR-RTO: 11 MG/G
MONOCYTES # BLD AUTO: 0.82 K/UL
MONOCYTES NFR BLD AUTO: 7.1 %
NEUTROPHILS # BLD AUTO: 8.16 K/UL
NEUTROPHILS NFR BLD AUTO: 70.8 %
NONHDLC SERPL-MCNC: 96 MG/DL
PLATELET # BLD AUTO: 268 K/UL
POTASSIUM SERPL-SCNC: 4.7 MMOL/L
PROT SERPL-MCNC: 7.3 G/DL
RBC # BLD: 4.48 M/UL
RBC # FLD: 14.4 %
SODIUM SERPL-SCNC: 139 MMOL/L
TRIGL SERPL-MCNC: 123 MG/DL
TSH SERPL-ACNC: 4.31 UIU/ML
WBC # FLD AUTO: 11.53 K/UL

## 2024-02-26 ENCOUNTER — EMERGENCY (EMERGENCY)
Facility: HOSPITAL | Age: 74
LOS: 1 days | Discharge: HOME | End: 2024-02-26
Attending: STUDENT IN AN ORGANIZED HEALTH CARE EDUCATION/TRAINING PROGRAM
Payer: MEDICARE

## 2024-02-26 VITALS — OXYGEN SATURATION: 98 %

## 2024-02-26 VITALS
OXYGEN SATURATION: 93 % | DIASTOLIC BLOOD PRESSURE: 79 MMHG | HEIGHT: 67 IN | WEIGHT: 235.89 LBS | HEART RATE: 84 BPM | TEMPERATURE: 98 F | RESPIRATION RATE: 20 BRPM | SYSTOLIC BLOOD PRESSURE: 159 MMHG

## 2024-02-26 DIAGNOSIS — Z98.890 OTHER SPECIFIED POSTPROCEDURAL STATES: Chronic | ICD-10-CM

## 2024-02-26 DIAGNOSIS — Z90.49 ACQUIRED ABSENCE OF OTHER SPECIFIED PARTS OF DIGESTIVE TRACT: Chronic | ICD-10-CM

## 2024-02-26 LAB
ANION GAP SERPL CALC-SCNC: 13 MMOL/L — SIGNIFICANT CHANGE UP (ref 5–17)
BASOPHILS # BLD AUTO: 0.06 K/UL — SIGNIFICANT CHANGE UP (ref 0–0.2)
BASOPHILS NFR BLD AUTO: 0.5 % — SIGNIFICANT CHANGE UP (ref 0–2)
BUN SERPL-MCNC: 14.7 MG/DL — SIGNIFICANT CHANGE UP (ref 8–20)
CALCIUM SERPL-MCNC: 9.7 MG/DL — SIGNIFICANT CHANGE UP (ref 8.4–10.5)
CHLORIDE SERPL-SCNC: 99 MMOL/L — SIGNIFICANT CHANGE UP (ref 96–108)
CO2 SERPL-SCNC: 24 MMOL/L — SIGNIFICANT CHANGE UP (ref 22–29)
CREAT SERPL-MCNC: 1.5 MG/DL — HIGH (ref 0.5–1.3)
EGFR: 48 ML/MIN/1.73M2 — LOW
EOSINOPHIL # BLD AUTO: 0.09 K/UL — SIGNIFICANT CHANGE UP (ref 0–0.5)
EOSINOPHIL NFR BLD AUTO: 0.7 % — SIGNIFICANT CHANGE UP (ref 0–6)
GLUCOSE SERPL-MCNC: 97 MG/DL — SIGNIFICANT CHANGE UP (ref 70–99)
HCT VFR BLD CALC: 42.6 % — SIGNIFICANT CHANGE UP (ref 39–50)
HGB BLD-MCNC: 14.2 G/DL — SIGNIFICANT CHANGE UP (ref 13–17)
IMM GRANULOCYTES NFR BLD AUTO: 0.4 % — SIGNIFICANT CHANGE UP (ref 0–0.9)
LYMPHOCYTES # BLD AUTO: 1.14 K/UL — SIGNIFICANT CHANGE UP (ref 1–3.3)
LYMPHOCYTES # BLD AUTO: 9.3 % — LOW (ref 13–44)
MCHC RBC-ENTMCNC: 30.7 PG — SIGNIFICANT CHANGE UP (ref 27–34)
MCHC RBC-ENTMCNC: 33.3 GM/DL — SIGNIFICANT CHANGE UP (ref 32–36)
MCV RBC AUTO: 92.2 FL — SIGNIFICANT CHANGE UP (ref 80–100)
MONOCYTES # BLD AUTO: 1.18 K/UL — HIGH (ref 0–0.9)
MONOCYTES NFR BLD AUTO: 9.6 % — SIGNIFICANT CHANGE UP (ref 2–14)
NEUTROPHILS # BLD AUTO: 9.77 K/UL — HIGH (ref 1.8–7.4)
NEUTROPHILS NFR BLD AUTO: 79.5 % — HIGH (ref 43–77)
PLATELET # BLD AUTO: 213 K/UL — SIGNIFICANT CHANGE UP (ref 150–400)
POTASSIUM SERPL-MCNC: 5.2 MMOL/L — SIGNIFICANT CHANGE UP (ref 3.5–5.3)
POTASSIUM SERPL-SCNC: 5.2 MMOL/L — SIGNIFICANT CHANGE UP (ref 3.5–5.3)
RBC # BLD: 4.62 M/UL — SIGNIFICANT CHANGE UP (ref 4.2–5.8)
RBC # FLD: 13.3 % — SIGNIFICANT CHANGE UP (ref 10.3–14.5)
SODIUM SERPL-SCNC: 136 MMOL/L — SIGNIFICANT CHANGE UP (ref 135–145)
WBC # BLD: 12.29 K/UL — HIGH (ref 3.8–10.5)
WBC # FLD AUTO: 12.29 K/UL — HIGH (ref 3.8–10.5)

## 2024-02-26 PROCEDURE — 99284 EMERGENCY DEPT VISIT MOD MDM: CPT

## 2024-02-26 PROCEDURE — 36415 COLL VENOUS BLD VENIPUNCTURE: CPT

## 2024-02-26 PROCEDURE — 94640 AIRWAY INHALATION TREATMENT: CPT

## 2024-02-26 PROCEDURE — 71045 X-RAY EXAM CHEST 1 VIEW: CPT

## 2024-02-26 PROCEDURE — 99284 EMERGENCY DEPT VISIT MOD MDM: CPT | Mod: 25

## 2024-02-26 PROCEDURE — 80048 BASIC METABOLIC PNL TOTAL CA: CPT

## 2024-02-26 PROCEDURE — 71045 X-RAY EXAM CHEST 1 VIEW: CPT | Mod: 26

## 2024-02-26 PROCEDURE — 85025 COMPLETE CBC W/AUTO DIFF WBC: CPT

## 2024-02-26 PROCEDURE — 96374 THER/PROPH/DIAG INJ IV PUSH: CPT

## 2024-02-26 RX ORDER — SODIUM CHLORIDE 9 MG/ML
500 INJECTION INTRAMUSCULAR; INTRAVENOUS; SUBCUTANEOUS ONCE
Refills: 0 | Status: COMPLETED | OUTPATIENT
Start: 2024-02-26 | End: 2024-02-26

## 2024-02-26 RX ORDER — ACETAMINOPHEN 500 MG
1000 TABLET ORAL ONCE
Refills: 0 | Status: COMPLETED | OUTPATIENT
Start: 2024-02-26 | End: 2024-02-26

## 2024-02-26 RX ORDER — IPRATROPIUM/ALBUTEROL SULFATE 18-103MCG
3 AEROSOL WITH ADAPTER (GRAM) INHALATION ONCE
Refills: 0 | Status: COMPLETED | OUTPATIENT
Start: 2024-02-26 | End: 2024-02-26

## 2024-02-26 RX ADMIN — Medication 400 MILLIGRAM(S): at 11:09

## 2024-02-26 RX ADMIN — SODIUM CHLORIDE 500 MILLILITER(S): 9 INJECTION INTRAMUSCULAR; INTRAVENOUS; SUBCUTANEOUS at 11:09

## 2024-02-26 RX ADMIN — Medication 3 MILLILITER(S): at 11:10

## 2024-02-26 NOTE — ED PROVIDER NOTE - CLINICAL SUMMARY MEDICAL DECISION MAKING FREE TEXT BOX
73 yom pmh copd not on O2, DM here with generalized body aches and cough x 2 days. Found to be COVID positive. Went to urgent care and rx'd paxlovid but questionable pna on xr and sent to ED. Denies fever, sob, cp, abd pain, leg swelling. Denies PE history. Denies cardiac history. No meds prior to arrival  Ap - not hypoxic. nontoxic appearing. symptoms likely 2/2 to covid. will repeat xr. check labs, supportive care. 73 yom pmh copd not on O2, DM here with generalized body aches and cough x 2 days. Found to be COVID positive. Went to urgent care and rx'd paxlovid but questionable pna on xr and sent to ED. Denies fever, sob, cp, abd pain, leg swelling. Denies PE history. Denies cardiac history. No meds prior to arrival  Ap - not hypoxic. nontoxic appearing. symptoms likely 2/2 to covid. will repeat xr. check labs, supportive care.    Progress: patient feels well. comfortable with dc plan. return precautions discussed

## 2024-02-26 NOTE — ED ADULT TRIAGE NOTE - CHIEF COMPLAINT QUOTE
Pt  sent from   for SOB/ coughing , chills symptoms started two days ago - pt tested positive foe COVID 19  and possible PN -  was sent to ED for further eval

## 2024-02-26 NOTE — ED PROVIDER NOTE - PATIENT PORTAL LINK FT
You can access the FollowMyHealth Patient Portal offered by [facility] by registering at the following website: [email or web address]. By joining [employer]’s FollowMyHealth portal, you will also be able to view your health information using other applications (apps) compatible with our system.

## 2024-02-26 NOTE — ED PROVIDER NOTE - PRO INTERPRETER NEED 2
"Formula Mixing Education    Diet Education: Formula Mixing - Similac Advance / 360 Total Care 22 kcal/oz  Time Spent: 10 min  Learners: Mom    Recipe: 1.7 oz (50 mL) water + 1 scoop formula powder  Recipe: 3.5 oz (105 mL) water + 2 scoops formula powder  Recipe: 16 oz (480 mL) water + 9 scoops formula powder  Recipe: 21 oz (630 mL) water + 12 scoops formula powder    Comments: Consult for formula mixing education. Patient receiving 2-3 oz q3h. Provided multiple recipes with goal of 540-630 mL daily (18-21 oz). RD provided formula mixing fortification education to mom. Provided larger sizes for when baby grows and shows more hunger queues. Told mom she can make up a large batch to keep in the fridge for a maximum of 24 hrs to use throughout the day. Mixing instructions noted on handout and reviewed with [facility] [place] physically copy provided. RD showed lines on home bottles to fill the water to. All questions and concerns answered. RD contact info provided.    Follow-Up: Yes    Thanks!    Nalini Chaney (Gabby), MS, RD, LDN   " English

## 2024-02-26 NOTE — ED ADULT NURSE NOTE - OBJECTIVE STATEMENT
Pt c/o chills, HA, cough, SOB x 2 days. States he went to urgent care this morning and got d/x with COVID and PNA. Denies fevers, cp, N/V/D.

## 2024-02-26 NOTE — ED ADULT NURSE NOTE - NSFALLUNIVINTERV_ED_ALL_ED
Bed/Stretcher in lowest position, wheels locked, appropriate side rails in place/Call bell, personal items and telephone in reach/Instruct patient to call for assistance before getting out of bed/chair/stretcher/Non-slip footwear applied when patient is off stretcher/[place] to call system/Physically safe environment - no spills, clutter or unnecessary equipment/Purposeful proactive rounding/Room/bathroom lighting operational, light cord in reach

## 2024-02-26 NOTE — ED PROVIDER NOTE - OBJECTIVE STATEMENT
73 yom pmh copd not on O2, DM here with generalized body aches and cough x 2 days. Found to be COVID positive. Went to urgent care and rx'd paxlovid but questionable pna on xr and sent to ED. Denies fever, sob, cp, abd pain, leg swelling. Denies PE history. Denies cardiac history.

## 2024-02-26 NOTE — ED ADULT TRIAGE NOTE - MEANS OF ARRIVAL
Colposcopy Note    Past History:     Patient's last menstrual period was 2020 (exact date).  Patient is not pregnant.     Previous Abnormal Pap Hx:  Abnormal Pap dated:  Pap-LGSIL; 2019 - Pap WNL, Pos HPV 16  Prior Colposcopy dated: 2011 - At [facility] - DIPIKA 1  Prior Treatment dated: N/A    Indications:  Encounter Diagnoses   Name Primary?     Cervical high risk HPV (human papillomavirus) test positive Yes     Pre-procedure lab exam        PROCEDURE:  The procedure was explained, and informed consent obtained. The patient was placed in the dorsal lithotomy position. A vaginal speculum was placed. A GC/Chlamydia culture was obtained. Dilute acetic acid was applied to cervix. The exocervix was visualized. The transformation zone was visualized satisfactorily. Colposcopy was done with white light and with the Abelardo light. Endocervical curettage was done. Biopsy done at the 6 o clock position(s) Colposcopy of vagina revealed: normal. The patient tolerated the procedure well. At the completion of the procedure, only scant bleeding was present. Hemostasis was achieved with Monsel's solution.    FINDINGS:  White epithelium @ 6 o clock position(s).  Punctation: absent  Mosaicism: absent    Physical Exam  Genitourinary:              ASSESSMENT:  Encounter Diagnoses   Name Primary?     Cervical high risk HPV (human papillomavirus) test positive Yes     Pre-procedure lab exam        PLAN:  If Bx shows DIPIKA 1 or less, follow-up with Ob/Gyn per [facility]P Guideline in 1 year for Pap & HPV cotest at next annual exam.     Procedure Planned:   If HGSIL, consider Laser vaporization.    Domenic Leonard MD    
ambulatory

## 2024-02-28 ENCOUNTER — APPOINTMENT (OUTPATIENT)
Dept: FAMILY MEDICINE | Facility: CLINIC | Age: 74
End: 2024-02-28

## 2024-03-04 ENCOUNTER — RX RENEWAL (OUTPATIENT)
Age: 74
End: 2024-03-04

## 2024-03-06 NOTE — REASON FOR VISIT
Patient: Lolis Cunningham Date: 3/6/2024   : 1990    34 [phone] pager  Available on Epic Chat Mon, Wed, Thurs, Friday 8-5     [Follow-Up] : a follow-up visit [Chest Pain] : chest Pain [COPD] : COPD [ILD] : ILD [Shortness of Breath] : shortness of Breath

## 2024-03-08 ENCOUNTER — RX RENEWAL (OUTPATIENT)
Age: 74
End: 2024-03-08

## 2024-04-25 ENCOUNTER — RX RENEWAL (OUTPATIENT)
Age: 74
End: 2024-04-25

## 2024-04-29 ENCOUNTER — LABORATORY RESULT (OUTPATIENT)
Age: 74
End: 2024-04-29

## 2024-04-29 ENCOUNTER — APPOINTMENT (OUTPATIENT)
Dept: INTERNAL MEDICINE | Facility: CLINIC | Age: 74
End: 2024-04-29
Payer: MEDICARE

## 2024-04-29 VITALS
HEART RATE: 78 BPM | RESPIRATION RATE: 16 BRPM | DIASTOLIC BLOOD PRESSURE: 64 MMHG | HEIGHT: 67 IN | WEIGHT: 245 LBS | SYSTOLIC BLOOD PRESSURE: 110 MMHG | BODY MASS INDEX: 38.45 KG/M2

## 2024-04-29 DIAGNOSIS — E11.9 TYPE 2 DIABETES MELLITUS W/OUT COMPLICATIONS: ICD-10-CM

## 2024-04-29 DIAGNOSIS — F41.9 ANXIETY DISORDER, UNSPECIFIED: ICD-10-CM

## 2024-04-29 DIAGNOSIS — E78.5 HYPERLIPIDEMIA, UNSPECIFIED: ICD-10-CM

## 2024-04-29 DIAGNOSIS — E11.49 TYPE 2 DIABETES MELLITUS WITH OTHER DIABETIC NEUROLOGICAL COMPLICATION: ICD-10-CM

## 2024-04-29 PROCEDURE — 36415 COLL VENOUS BLD VENIPUNCTURE: CPT

## 2024-04-29 PROCEDURE — 99214 OFFICE O/P EST MOD 30 MIN: CPT

## 2024-04-29 RX ORDER — ESCITALOPRAM OXALATE 20 MG/1
20 TABLET ORAL
Qty: 30 | Refills: 1 | Status: DISCONTINUED | COMMUNITY
Start: 2024-03-08 | End: 2024-04-29

## 2024-04-29 RX ORDER — LOSARTAN POTASSIUM 25 MG/1
25 TABLET, FILM COATED ORAL
Qty: 1 | Refills: 1 | Status: ACTIVE | COMMUNITY
Start: 2022-12-05 | End: 1900-01-01

## 2024-04-29 RX ORDER — ATORVASTATIN CALCIUM 40 MG/1
40 TABLET, FILM COATED ORAL
Qty: 90 | Refills: 1 | Status: ACTIVE | COMMUNITY
Start: 2019-08-26 | End: 1900-01-01

## 2024-04-29 RX ORDER — METFORMIN HYDROCHLORIDE 500 MG/1
500 TABLET, COATED ORAL
Qty: 180 | Refills: 1 | Status: ACTIVE | COMMUNITY
Start: 2019-06-10 | End: 1900-01-01

## 2024-04-29 RX ORDER — SILDENAFIL 100 MG/1
100 TABLET, FILM COATED ORAL
Qty: 15 | Refills: 0 | Status: ACTIVE | COMMUNITY
Start: 2021-04-26 | End: 1900-01-01

## 2024-04-29 RX ORDER — SERTRALINE HYDROCHLORIDE 50 MG/1
50 TABLET, FILM COATED ORAL
Qty: 90 | Refills: 1 | Status: ACTIVE | COMMUNITY
Start: 2024-01-29 | End: 1900-01-01

## 2024-04-29 NOTE — ASSESSMENT
[FreeTextEntry1] : Anxiety- well controlled, will continue sertraline 50mg daily  Type 2 diabetes-we will continue metformin 500 mg twice daily Will check hemoglobin A1c today patient up to date with annual eye exam  HLD - will continue atorvastatin 40mg daily, will check lipid panel and cmp  COPD - continue to follow with pulm, continue stiolto inhaler

## 2024-04-29 NOTE — HEALTH RISK ASSESSMENT
[0] : 2) Feeling down, depressed, or hopeless: Not at all (0) [PHQ-2 Negative - No further assessment needed] : PHQ-2 Negative - No further assessment needed [Former] : Former [20 or more] : 20 or more [> 15 Years] : > 15 Years [[id number]] : 0 [de-identified] : quit 35 years ago

## 2024-04-29 NOTE — HISTORY OF PRESENT ILLNESS
[FreeTextEntry1] : follow up [de-identified] : 73-year-old male with past medical history of type 2 diabetes, hyperlipidemia, hypertension, COPD presents for follow-up visit.    Patient had been taking celexa for anxiety, however did not feel it was helping, he switched to sertaline 50mg daily at last visit. Patient doing well on sertaline 50mg  For type 2 diabetes, patient has been stable on metformin 500 mg twice daily  For HLD, patient currently on atorvastatin 40 mg daily.

## 2024-04-30 ENCOUNTER — NON-APPOINTMENT (OUTPATIENT)
Age: 74
End: 2024-04-30

## 2024-04-30 LAB
ALBUMIN SERPL ELPH-MCNC: 4.4 G/DL
ALP BLD-CCNC: 101 U/L
ALT SERPL-CCNC: 15 U/L
ANION GAP SERPL CALC-SCNC: 11 MMOL/L
AST SERPL-CCNC: 14 U/L
BASOPHILS # BLD AUTO: 0.06 K/UL
BASOPHILS NFR BLD AUTO: 0.5 %
BILIRUB SERPL-MCNC: 0.6 MG/DL
BUN SERPL-MCNC: 16 MG/DL
CALCIUM SERPL-MCNC: 9.9 MG/DL
CHLORIDE SERPL-SCNC: 105 MMOL/L
CHOLEST SERPL-MCNC: 135 MG/DL
CO2 SERPL-SCNC: 26 MMOL/L
CREAT SERPL-MCNC: 1.63 MG/DL
EGFR: 44 ML/MIN/1.73M2
EOSINOPHIL # BLD AUTO: 0.39 K/UL
EOSINOPHIL NFR BLD AUTO: 3.5 %
ESTIMATED AVERAGE GLUCOSE: 137 MG/DL
GLUCOSE SERPL-MCNC: 98 MG/DL
HBA1C MFR BLD HPLC: 6.4 %
HCT VFR BLD CALC: 43.6 %
HDLC SERPL-MCNC: 40 MG/DL
HGB BLD-MCNC: 13.4 G/DL
IMM GRANULOCYTES NFR BLD AUTO: 0.4 %
LDLC SERPL CALC-MCNC: 74 MG/DL
LYMPHOCYTES # BLD AUTO: 2.43 K/UL
LYMPHOCYTES NFR BLD AUTO: 21.9 %
MAN DIFF?: NORMAL
MCHC RBC-ENTMCNC: 29.7 PG
MCHC RBC-ENTMCNC: 30.7 GM/DL
MCV RBC AUTO: 96.7 FL
MONOCYTES # BLD AUTO: 0.79 K/UL
MONOCYTES NFR BLD AUTO: 7.1 %
NEUTROPHILS # BLD AUTO: 7.4 K/UL
NEUTROPHILS NFR BLD AUTO: 66.6 %
NONHDLC SERPL-MCNC: 95 MG/DL
PLATELET # BLD AUTO: 292 K/UL
POTASSIUM SERPL-SCNC: 5 MMOL/L
PROT SERPL-MCNC: 6.7 G/DL
RBC # BLD: 4.51 M/UL
RBC # FLD: 14.2 %
SODIUM SERPL-SCNC: 142 MMOL/L
TRIGL SERPL-MCNC: 113 MG/DL
TSH SERPL-ACNC: 4.6 UIU/ML
WBC # FLD AUTO: 11.12 K/UL

## 2024-05-02 ENCOUNTER — APPOINTMENT (OUTPATIENT)
Dept: PULMONOLOGY | Facility: CLINIC | Age: 74
End: 2024-05-02
Payer: MEDICARE

## 2024-05-02 VITALS
DIASTOLIC BLOOD PRESSURE: 64 MMHG | OXYGEN SATURATION: 93 % | HEART RATE: 74 BPM | SYSTOLIC BLOOD PRESSURE: 120 MMHG | RESPIRATION RATE: 16 BRPM

## 2024-05-02 VITALS — BODY MASS INDEX: 37.67 KG/M2 | HEIGHT: 67 IN | WEIGHT: 240 LBS

## 2024-05-02 DIAGNOSIS — J84.9 INTERSTITIAL PULMONARY DISEASE, UNSPECIFIED: ICD-10-CM

## 2024-05-02 DIAGNOSIS — J44.9 CHRONIC OBSTRUCTIVE PULMONARY DISEASE, UNSPECIFIED: ICD-10-CM

## 2024-05-02 DIAGNOSIS — R06.09 OTHER FORMS OF DYSPNEA: ICD-10-CM

## 2024-05-02 PROCEDURE — 99214 OFFICE O/P EST MOD 30 MIN: CPT | Mod: 25

## 2024-05-02 PROCEDURE — 94010 BREATHING CAPACITY TEST: CPT

## 2024-05-02 RX ORDER — PREDNISONE 10 MG/1
10 TABLET ORAL
Qty: 40 | Refills: 0 | Status: ACTIVE | COMMUNITY
Start: 2024-05-02 | End: 1900-01-01

## 2024-05-02 NOTE — DISCUSSION/SUMMARY
[FreeTextEntry1] : COPD Gold grade III complicated by interstitial lung disease component /prob UIP/mosaic areas of emphysema noted connective tissue serologies are negative, was tolerating pirfenidone but denies benefit and stopped, saw second opinion who felt more asbestosis related  Discussed trial of  anti fibrotic, not interested currently, will try short course of prednisone ( will discuss glucose mgmt with PMD) Needs repeat CT scan chest, last 6/23 stable from 6/20 Continue Stiolto, no bronchospasm on exam, no sputum Spirometry with mild symmetrical decrease FVC and FEV1, will repeat at follow up Obesity with moderate  obstructive sleep apnea - ( AHI 20) intolerant to CPAP , not using oral appliance Weight loss encouraged Cardiology following 2 months or sooner if needed with zi and CT, will re discuss trial of OFEV pending status

## 2024-05-02 NOTE — HISTORY OF PRESENT ILLNESS
[Former] : former [TextBox_4] : Feels better increasing dyspnea no cp or sputum Stiolto daily no fever, chill, chest pain    [YearQuit] : 2000 [FreeTextEntry1] : \par

## 2024-05-02 NOTE — CONSULT LETTER
[Dear  ___] : Dear  [unfilled], [Consult Letter:] : I had the pleasure of evaluating your patient, [unfilled]. [Please see my note below.] : Please see my note below. [Consult Closing:] : Thank you very much for allowing me to participate in the care of this patient.  If you have any questions, please do not hesitate to contact me. [Sincerely,] : Sincerely, [DrIsaias  ___] : Dr. CHAPA [[facility]ede Wilkinson DO, [facility]] : Fede Wilkinson DO, [facility] [Director, Respiratory Care] : Director, Respiratory Care [[facility]] : [facility] [FreeTextEntry3] : Fede Wilkinson DO [facility]\par  Pulmonary Critical Care\par  Director Pulmonary Division\par  Medical Director Respiratory Therapy\par  [facility]\par  \par

## 2024-05-04 ENCOUNTER — OUTPATIENT (OUTPATIENT)
Dept: OUTPATIENT SERVICES | Facility: HOSPITAL | Age: 74
LOS: 1 days | End: 2024-05-04

## 2024-05-04 ENCOUNTER — APPOINTMENT (OUTPATIENT)
Dept: CT IMAGING | Facility: CLINIC | Age: 74
End: 2024-05-04
Payer: MEDICARE

## 2024-05-04 DIAGNOSIS — R06.09 OTHER FORMS OF DYSPNEA: ICD-10-CM

## 2024-05-04 DIAGNOSIS — Z98.890 OTHER SPECIFIED POSTPROCEDURAL STATES: Chronic | ICD-10-CM

## 2024-05-04 DIAGNOSIS — Z90.49 ACQUIRED ABSENCE OF OTHER SPECIFIED PARTS OF DIGESTIVE TRACT: Chronic | ICD-10-CM

## 2024-05-04 DIAGNOSIS — J84.9 INTERSTITIAL PULMONARY DISEASE, UNSPECIFIED: ICD-10-CM

## 2024-05-04 PROCEDURE — 71250 CT THORAX DX C-: CPT | Mod: 26

## 2024-07-08 ENCOUNTER — OFFICE (OUTPATIENT)
Dept: URBAN - METROPOLITAN AREA CLINIC 63 | Facility: CLINIC | Age: 74
Setting detail: OPHTHALMOLOGY
End: 2024-07-08
Payer: MEDICARE

## 2024-07-08 DIAGNOSIS — D31.32: ICD-10-CM

## 2024-07-08 DIAGNOSIS — E11.9: ICD-10-CM

## 2024-07-08 PROCEDURE — 92250 FUNDUS PHOTOGRAPHY W/I&R: CPT | Performed by: SPECIALIST

## 2024-07-08 PROCEDURE — 92014 COMPRE OPH EXAM EST PT 1/>: CPT | Performed by: SPECIALIST

## 2024-07-08 ASSESSMENT — LID EXAM ASSESSMENTS
OS_BLEPHARITIS: LLL LUL 2+
OD_BLEPHARITIS: RLL RUL 2+

## 2024-07-08 ASSESSMENT — CONFRONTATIONAL VISUAL FIELD TEST (CVF)
OD_FINDINGS: FULL
OS_FINDINGS: FULL

## 2024-07-29 ENCOUNTER — APPOINTMENT (OUTPATIENT)
Dept: INTERNAL MEDICINE | Facility: CLINIC | Age: 74
End: 2024-07-29
Payer: MEDICARE

## 2024-07-29 VITALS
HEART RATE: 74 BPM | OXYGEN SATURATION: 92 % | HEIGHT: 67 IN | BODY MASS INDEX: 39.24 KG/M2 | DIASTOLIC BLOOD PRESSURE: 65 MMHG | SYSTOLIC BLOOD PRESSURE: 132 MMHG | WEIGHT: 250 LBS

## 2024-07-29 DIAGNOSIS — F41.9 ANXIETY DISORDER, UNSPECIFIED: ICD-10-CM

## 2024-07-29 DIAGNOSIS — J44.9 CHRONIC OBSTRUCTIVE PULMONARY DISEASE, UNSPECIFIED: ICD-10-CM

## 2024-07-29 DIAGNOSIS — E78.5 HYPERLIPIDEMIA, UNSPECIFIED: ICD-10-CM

## 2024-07-29 DIAGNOSIS — E11.49 TYPE 2 DIABETES MELLITUS WITH OTHER DIABETIC NEUROLOGICAL COMPLICATION: ICD-10-CM

## 2024-07-29 DIAGNOSIS — R79.89 OTHER SPECIFIED ABNORMAL FINDINGS OF BLOOD CHEMISTRY: ICD-10-CM

## 2024-07-29 PROCEDURE — 99214 OFFICE O/P EST MOD 30 MIN: CPT

## 2024-07-29 PROCEDURE — 36415 COLL VENOUS BLD VENIPUNCTURE: CPT

## 2024-07-29 NOTE — HISTORY OF PRESENT ILLNESS
[de-identified] : 74-year-old male with past medical history of type 2 diabetes, hyperlipidemia, hypertension, COPD presents for follow-up visit.    For type 2 diabetes, patient has been stable on metformin 500 mg twice daily  For HLD, patient currently on atorvastatin 40 mg daily.

## 2024-07-29 NOTE — ASSESSMENT
[FreeTextEntry1] : Anxiety- well controlled, will continue sertraline 50mg daily  Type 2 diabetes-we will continue metformin 500 mg twice daily Will check hemoglobin A1c today patient up to date with annual eye exam  HLD - will continue atorvastatin 40mg daily  COPD - continue to follow with pulm, continue stiolto inhaler  Elevated TSH on most recent blood work will repeat tsh and free t4

## 2024-08-01 LAB
ESTIMATED AVERAGE GLUCOSE: 137 MG/DL
HBA1C MFR BLD HPLC: 6.4 %
T4 FREE SERPL-MCNC: 1.1 NG/DL
TSH SERPL-ACNC: 3.87 UIU/ML

## 2024-08-12 NOTE — ED PROVIDER NOTE - IV ALTEPLASE DOOR HIDDEN
WOUND CARE NOTE    History:  Past Medical History:    Acute kidney insufficiency    Anemia    Arrhythmia    Back problem    CHF (congestive heart failure) (HCC)    CKD (chronic kidney disease) stage 3, GFR 30-59 ml/min (HCC)    Deep vein thrombosis (HCC)    on B.T for only a month, possibly Magen    Essential hypertension    High blood pressure    History of blood transfusion    Rheumatoid arthritis (HCC)    Seizure disorder (HCC)    Pt denied at screening call 6/28/24     Past Surgical History:   Procedure Laterality Date    Cabg      Cardiac pacemaker placement      Colonoscopy N/A 01/17/2024    Dr. Rios    Colonoscopy N/A 01/17/2024    Procedure: COLONOSCOPY;  Surgeon: Zoraida Rios MD;  Location: [facility] ENDOSCOPY    Egd N/A 01/17/2024    Dr. Rios    Fracture surgery      Other surgical history  2017    Heart Surgery     Other surgical history  2020    Bilateral shoulder replacement       Social History     Socioeconomic History    Marital status:    Tobacco Use    Smoking status: Former     Current packs/day: 0.00     Types: Cigarettes     Quit date: 2014     Years since quitting: 10.6    Smokeless tobacco: Never   Vaping Use    Vaping status: Never Used   Substance and Sexual Activity    Alcohol use: Never    Drug use: Never     Social Determinants of Health     Financial Resource Strain: Low Risk  (6/4/2024)    Financial Resource Strain     Difficulty of Paying Living Expenses: Not very hard     Med Affordability: No   Food Insecurity: Unknown (8/12/2024)    Food Insecurity     Food Insecurity: Patient declined   Transportation Needs: Unknown (8/12/2024)    Transportation Needs     Lack of Transportation: Patient declined   Housing Stability: Unknown (8/12/2024)    Housing Stability     Housing Instability: Patient declined       PLAN   Recommendations:  Dr. Mckeon currently on consult  Dietary consult for recommendations for nutrition to optimize wound healing  Turn schedules  Specialty bed: Air pump is on  the Isotour gel mattress  Heels elevated using pillows, heel wedge or heel boots to offload heels  Use of lift equipment  To prevent sliding: decrease head of bed and elevate foot of bed as medical condition tolerates  Prompt incontinence care  Moisture barrier for incontinence. May consider Zinc skin barrier to left buttock  Glucose control to help promote wound healing    Wound(s)  Location: Right buttock  Cleansing  Saline   Topical 1/4 St Dakins damp on gauze to right buttock wound  Dressings gauze  Secure with Border foam  Frequency Q 12 hrs and prn if soiled   Location: Left buttock  Cleansing  Pamper wipes  Topical Zinc skin barrier  Dressings Sacral foam  Frequency Q 12 Hrs and prn     Discharge Recommendations: The pt. is home with  care 2 times weekly.            OBJECTIVE   MD Consult new      ASSESSMENT   Boston Score:  Boston Scale Score: 14    Chart Reviewed: yes    Wound(s):  The pt. is resting on her side in the bed, air pump is on the mattress. Her family is at the bedside. She is agreeable for wound assessments. Bilateral heels are intact. See the wound assessment, right buttock is unstageable with maceration, left buttock stage 2's and also r/t friction and shear. Injuries were cleansed and redressed. Educated the pt. on each step and dressings, turning in bed, elevate heels. Call light in reach. She was repositioned in bed. Per MD note the pt. has not been eating, she has been having diarrhea for 2 weeks and she is bed bound. Spoke with the nurse. Per MD orders, the pt. will have an I and D of the buttock injuries tomorrow by Dr. Mckeon. No questions at this time from the pt. or family.        Allergies: Lisinopril    Labs:   Lab Results   Component Value Date    WBC 1.3 (L) 08/12/2024    HGB 7.4 (L) 08/12/2024    HCT 23.6 (L) 08/12/2024    .0 (L) 08/12/2024    CREATSERUM 2.69 (H) 08/12/2024    BUN 59 (H) 08/12/2024     (L) 08/12/2024    K 4.2 08/12/2024     08/12/2024    CO2  25.0 08/12/2024    GLU 92 08/12/2024    CA 9.1 08/12/2024    ALB 4.0 07/01/2024    ALKPHO 328 (H) 06/18/2024    BILT 0.8 06/18/2024    TP 7.7 06/18/2024    AST 23 06/18/2024    ALT 11 06/18/2024    LIP 32 01/13/2024    MG 2.0 07/20/2024    PHOS 3.8 07/01/2024     No results found for: \"PREALBUMIN\"      Time Spent 15 Minutes.    María Elena Chaney RN  [facility] IP Wound Care  [facility]  [phone]     08/12/24 1521   Wound 08/12/24 Buttocks Right;Left   Date First Assessed/Time First Assessed: 08/12/24 1502   Present on Original Admission: Yes  Primary Wound Type: Pressure Injury  Location: Buttocks  Wound Location Orientation: Right;Left  Wound Description (Comments): unstageable & maceration to R b...   Wound Image    Site Assessment Fragile;Moist;Painful;Tan;White;Yellow;Pale;Pink   Drainage Amount Scant   Drainage Description Yellow;Serous;Odor   Treatments Cleansed;Saline;Site Care;Topical (Barrier/Moisturizer/Ointment)  (zinc to left buttock injuries)   Dressing 4x4s;Aquacel Foam  (damp saline gauze & border foam to right buttock, will obtain orders)   Dressing Changed Changed   Dressing Status Clean;Dry;Intact;Dressing Changed   Wound Length (cm)   (traced on admit)   Wound Depth (cm)   (right buttock- necrotic tissue, left butt- 0.1 cm)   Rochelle-wound Assessment Maceration;Induration;Moist;Pink;White   Wound Granulation Tissue Pink;Red  (left buttock only)   State of Healing Non-healing   Wound Odor Strong  (right buttock)   Shape   (irregular)   Pressure Injury Stage U  (and stage 2 and shear to left buttock)   Wound Follow Up   Follow up needed Yes        show

## 2024-08-14 ENCOUNTER — APPOINTMENT (OUTPATIENT)
Dept: PULMONOLOGY | Facility: CLINIC | Age: 74
End: 2024-08-14
Payer: MEDICARE

## 2024-08-14 VITALS
DIASTOLIC BLOOD PRESSURE: 62 MMHG | RESPIRATION RATE: 16 BRPM | OXYGEN SATURATION: 94 % | SYSTOLIC BLOOD PRESSURE: 144 MMHG | HEART RATE: 95 BPM

## 2024-08-14 VITALS — HEIGHT: 67 IN | BODY MASS INDEX: 38.3 KG/M2 | WEIGHT: 244 LBS

## 2024-08-14 VITALS — OXYGEN SATURATION: 95 %

## 2024-08-14 DIAGNOSIS — J44.9 CHRONIC OBSTRUCTIVE PULMONARY DISEASE, UNSPECIFIED: ICD-10-CM

## 2024-08-14 DIAGNOSIS — R06.09 OTHER FORMS OF DYSPNEA: ICD-10-CM

## 2024-08-14 PROCEDURE — 94010 BREATHING CAPACITY TEST: CPT

## 2024-08-14 PROCEDURE — 99213 OFFICE O/P EST LOW 20 MIN: CPT | Mod: 25

## 2024-08-14 NOTE — CONSULT LETTER
[Dear  ___] : Dear  [unfilled], [Consult Letter:] : I had the pleasure of evaluating your patient, [unfilled]. [Please see my note below.] : Please see my note below. [Consult Closing:] : Thank you very much for allowing me to participate in the care of this patient.  If you have any questions, please do not hesitate to contact me. [Sincerely,] : Sincerely, [DrIsaias  ___] : Dr. CHAPA [[facility]kinson DO, [facility]] : Fede Wilkinson DO, [facility] [Director, Respiratory Care] : Director, Respiratory Care [[facility]] : [facility] [FreeTextEntry3] : Fede [facility] DO [facility]\par  Pulmonary Critical Care\par  Director Pulmonary Division\par  Medical Director Respiratory Therapy\par  [facility]\par  \par

## 2024-08-14 NOTE — PROCEDURE
[FreeTextEntry1] : CT 5/24 stable ILD spirometry moderate air flow obstruction- no sig change from 5/24

## 2024-08-14 NOTE — HISTORY OF PRESENT ILLNESS
[Former] : former [TextBox_4] : Feels better dyspnea at baseline no cp or sputum Stiolto daily no fever, chill, chest pain    [YearQuit] : 2000 [FreeTextEntry1] : \par

## 2024-08-14 NOTE — DISCUSSION/SUMMARY
[FreeTextEntry1] : COPD Gold grade III complicated by interstitial lung disease component /prob UIP/mosaic areas of emphysema noted connective tissue serologies are negative, was tolerating pirfenidone but denies benefit and stopped,  Discussed trial of  anti fibrotic, not interested currently,   CT scan chest 5/24 and current spirometry stable Denies benefit of recent prednisone taper Continue Stiolto, no bronchospasm on exam, no sputum Obesity with moderate  obstructive sleep apnea - ( AHI 20) intolerant to CPAP , not using oral appliance Weight loss encouraged Cardiology following, needs updated stress test duplex LE 4 months or sooner if needed

## 2024-10-02 ENCOUNTER — INPATIENT (INPATIENT)
Facility: HOSPITAL | Age: 74
LOS: 0 days | Discharge: HOME | DRG: 311 | End: 2024-10-03
Attending: STUDENT IN AN ORGANIZED HEALTH CARE EDUCATION/TRAINING PROGRAM | Admitting: STUDENT IN AN ORGANIZED HEALTH CARE EDUCATION/TRAINING PROGRAM
Payer: MEDICARE

## 2024-10-02 VITALS
DIASTOLIC BLOOD PRESSURE: 78 MMHG | TEMPERATURE: 98 F | SYSTOLIC BLOOD PRESSURE: 146 MMHG | WEIGHT: 244.93 LBS | HEART RATE: 92 BPM | RESPIRATION RATE: 22 BRPM | OXYGEN SATURATION: 98 %

## 2024-10-02 DIAGNOSIS — I10 ESSENTIAL (PRIMARY) HYPERTENSION: ICD-10-CM

## 2024-10-02 DIAGNOSIS — Z98.890 OTHER SPECIFIED POSTPROCEDURAL STATES: Chronic | ICD-10-CM

## 2024-10-02 DIAGNOSIS — I20.0 UNSTABLE ANGINA: ICD-10-CM

## 2024-10-02 DIAGNOSIS — Z90.49 ACQUIRED ABSENCE OF OTHER SPECIFIED PARTS OF DIGESTIVE TRACT: Chronic | ICD-10-CM

## 2024-10-02 DIAGNOSIS — E78.5 HYPERLIPIDEMIA, UNSPECIFIED: ICD-10-CM

## 2024-10-02 LAB
ALBUMIN SERPL ELPH-MCNC: 3.9 G/DL — SIGNIFICANT CHANGE UP (ref 3.3–5.2)
ALP SERPL-CCNC: 109 U/L — SIGNIFICANT CHANGE UP (ref 40–120)
ALT FLD-CCNC: 11 U/L — SIGNIFICANT CHANGE UP
ANION GAP SERPL CALC-SCNC: 9 MMOL/L — SIGNIFICANT CHANGE UP (ref 5–17)
APTT BLD: 28.4 SEC — SIGNIFICANT CHANGE UP (ref 24.5–35.6)
AST SERPL-CCNC: 17 U/L — SIGNIFICANT CHANGE UP
BASOPHILS # BLD AUTO: 0.06 K/UL — SIGNIFICANT CHANGE UP (ref 0–0.2)
BASOPHILS NFR BLD AUTO: 0.5 % — SIGNIFICANT CHANGE UP (ref 0–2)
BILIRUB SERPL-MCNC: 0.6 MG/DL — SIGNIFICANT CHANGE UP (ref 0.4–2)
BUN SERPL-MCNC: 26 MG/DL — HIGH (ref 8–20)
CALCIUM SERPL-MCNC: 10.2 MG/DL — SIGNIFICANT CHANGE UP (ref 8.4–10.5)
CHLORIDE SERPL-SCNC: 105 MMOL/L — SIGNIFICANT CHANGE UP (ref 96–108)
CO2 SERPL-SCNC: 28 MMOL/L — SIGNIFICANT CHANGE UP (ref 22–29)
CREAT SERPL-MCNC: 1.69 MG/DL — HIGH (ref 0.5–1.3)
EGFR: 42 ML/MIN/1.73M2 — LOW
EOSINOPHIL # BLD AUTO: 0.28 K/UL — SIGNIFICANT CHANGE UP (ref 0–0.5)
EOSINOPHIL NFR BLD AUTO: 2.2 % — SIGNIFICANT CHANGE UP (ref 0–6)
GLUCOSE SERPL-MCNC: 116 MG/DL — HIGH (ref 70–99)
HCT VFR BLD CALC: 41.5 % — SIGNIFICANT CHANGE UP (ref 39–50)
HGB BLD-MCNC: 13.4 G/DL — SIGNIFICANT CHANGE UP (ref 13–17)
IMM GRANULOCYTES NFR BLD AUTO: 0.4 % — SIGNIFICANT CHANGE UP (ref 0–0.9)
INR BLD: 1.03 RATIO — SIGNIFICANT CHANGE UP (ref 0.85–1.16)
LYMPHOCYTES # BLD AUTO: 16.1 % — SIGNIFICANT CHANGE UP (ref 13–44)
LYMPHOCYTES # BLD AUTO: 2.06 K/UL — SIGNIFICANT CHANGE UP (ref 1–3.3)
MAGNESIUM SERPL-MCNC: 2.2 MG/DL — SIGNIFICANT CHANGE UP (ref 1.8–2.6)
MCHC RBC-ENTMCNC: 29.7 PG — SIGNIFICANT CHANGE UP (ref 27–34)
MCHC RBC-ENTMCNC: 32.3 GM/DL — SIGNIFICANT CHANGE UP (ref 32–36)
MCV RBC AUTO: 92 FL — SIGNIFICANT CHANGE UP (ref 80–100)
MONOCYTES # BLD AUTO: 1.03 K/UL — HIGH (ref 0–0.9)
MONOCYTES NFR BLD AUTO: 8 % — SIGNIFICANT CHANGE UP (ref 2–14)
NEUTROPHILS # BLD AUTO: 9.35 K/UL — HIGH (ref 1.8–7.4)
NEUTROPHILS NFR BLD AUTO: 72.8 % — SIGNIFICANT CHANGE UP (ref 43–77)
NT-PROBNP SERPL-SCNC: 416 PG/ML — HIGH (ref 0–300)
PLATELET # BLD AUTO: 229 K/UL — SIGNIFICANT CHANGE UP (ref 150–400)
POTASSIUM SERPL-MCNC: 4.8 MMOL/L — SIGNIFICANT CHANGE UP (ref 3.5–5.3)
POTASSIUM SERPL-SCNC: 4.8 MMOL/L — SIGNIFICANT CHANGE UP (ref 3.5–5.3)
PROT SERPL-MCNC: 6.9 G/DL — SIGNIFICANT CHANGE UP (ref 6.6–8.7)
PROTHROM AB SERPL-ACNC: 11.6 SEC — SIGNIFICANT CHANGE UP (ref 9.9–13.4)
RBC # BLD: 4.51 M/UL — SIGNIFICANT CHANGE UP (ref 4.2–5.8)
RBC # FLD: 13.6 % — SIGNIFICANT CHANGE UP (ref 10.3–14.5)
SODIUM SERPL-SCNC: 142 MMOL/L — SIGNIFICANT CHANGE UP (ref 135–145)
TROPONIN T, HIGH SENSITIVITY RESULT: 26 NG/L — SIGNIFICANT CHANGE UP (ref 0–51)
TROPONIN T, HIGH SENSITIVITY RESULT: 27 NG/L — SIGNIFICANT CHANGE UP (ref 0–51)
WBC # BLD: 12.83 K/UL — HIGH (ref 3.8–10.5)
WBC # FLD AUTO: 12.83 K/UL — HIGH (ref 3.8–10.5)

## 2024-10-02 PROCEDURE — 99285 EMERGENCY DEPT VISIT HI MDM: CPT

## 2024-10-02 PROCEDURE — 71045 X-RAY EXAM CHEST 1 VIEW: CPT | Mod: 26

## 2024-10-02 PROCEDURE — 93010 ELECTROCARDIOGRAM REPORT: CPT

## 2024-10-02 PROCEDURE — 71275 CT ANGIOGRAPHY CHEST: CPT | Mod: 26,MC

## 2024-10-02 RX ORDER — ASPIRIN 325 MG
324 TABLET ORAL ONCE
Refills: 0 | Status: COMPLETED | OUTPATIENT
Start: 2024-10-02 | End: 2024-10-02

## 2024-10-02 RX ORDER — FUROSEMIDE 10 MG/ML
40 INJECTION INTRAVENOUS ONCE
Refills: 0 | Status: COMPLETED | OUTPATIENT
Start: 2024-10-02 | End: 2024-10-02

## 2024-10-02 RX ADMIN — Medication 324 MILLIGRAM(S): at 16:38

## 2024-10-02 RX ADMIN — FUROSEMIDE 40 MILLIGRAM(S): 10 INJECTION INTRAVENOUS at 20:08

## 2024-10-02 NOTE — CONSULT NOTE ADULT - PROBLEM SELECTOR RECOMMENDATION 9
Admit and monitor on Tele overnight for acute arrhythmias or recurrent angina symptoms  - check labs including CBC, BMP, hsT-T, FLP and A1C in AM, check ECG and CXR tonight  - continue home ASA, BB, Atorvastatin and low cholesterol diet, monitor LFTs  - schedule for TTE to assess functional/structural status  - Nuclear Stress Test in AM  - keep NPO after midnight  - pain management as needed (NTG/MSO4)

## 2024-10-02 NOTE — CONSULT NOTE ADULT - PROBLEM SELECTOR RECOMMENDATION 3
Low cholesterol diet, daily exercise and optimal weight management reinforced  - continue home Statin therapy, follow LFTs  - check FLP in AM    case d/w Dr. Casillas

## 2024-10-02 NOTE — ED ADULT TRIAGE NOTE - CHIEF COMPLAINT QUOTE
Patient presents to ER C/O CP/SOB since last night, HX of COPD, HTN, patient currently on O2 2 lts, denies cardiac HX, resp even/labored.

## 2024-10-02 NOTE — ED ADULT NURSE NOTE - OBJECTIVE STATEMENT
BIBEMS, presents to ED c/o midsternal/left sided chest pressure and ALDRICH. states chest pressure "woke me from my sleep last night @ two in the morning" and "it feels like people are stepping on my chest". denies taking medication for pain @ home. states he has a similar episode x10 years ago and had a collapsed lung. denies fever, chills, cough. weakness, n/v, headache, dizziness, hx htn and copd currently on 2L nasal canula- denies using o@ @ baseline.

## 2024-10-02 NOTE — ED PROVIDER NOTE - BIRTH SEX
Return to hospital for...  Uterine contractions that are 2-3 minutes apart and do not go away with change of activity.  Vaginal bleeding.  Leaking of fluid or your water breaks.  Decreased fetal movement.  If spotting continues [facility] to [facility] an appointment with Dr Colon as soon as possible  Take all medications as prescribed and keep your follow up appointment with your provider.     Male

## 2024-10-02 NOTE — CONSULT NOTE ADULT - PROBLEM SELECTOR RECOMMENDATION 2
Assess for target organ damage (CKD, papilledema, encephalopathy) BP goal<130/80mmHg  - lifestyle modifications (DASH diet, daily exercise encouraged, weight loss, limit ETOH intake, avoid NSAIDs)   - VS as per unit protocol  - pharmacologic options: Thiazide (Chlorthalidone) with normal GFR, (Loops w/ GFR<30ml/min), ARBs, BB

## 2024-10-02 NOTE — CONSULT NOTE ADULT - ASSESSMENT
72yo M w/ PMHx of HTN, HLD, NJ, COPD, T2DM presents to [facility] after developing shortness of breath and chest heaviness this morning.  Patient states he woke this morning with acute onset shortness of breath and chest heaviness.  Pain was dull, intermittent, substernal, pressure like, non- radiating and it came on at rest.  Patient had associated diaphoresis.  Symptoms lasted for few minutes, subsided then came back again.  Patient stayed at rest and then came in for evaluation.  Denies HA, lightheadedness, palpitations, N/V, cough, fever, chills, abdominal pain or leg edema.  Cardiology called for unstable angina.  ECG: NSR, new TWI in anterolateral leads.  hsT-T: 29-> 28

## 2024-10-02 NOTE — ED PROVIDER NOTE - CLINICAL SUMMARY MEDICAL DECISION MAKING FREE TEXT BOX
73-year-old male; with PMH significant for HTN, HLD, NJ, COPD, DM; now presenting with shortness of breath.  Patient reports awakening this morning with acute onset shortness of breath and chest pressure.  Patient reports awakening with shortness of breath with chest pressure–substernal, pressure, nonradiating, nonexertional, with mild diaphoresis. ekg reveals diffuse twi @ II,III,aVF, v3-v6. initial trop 26 with elevated bnp. cxr with congestion.

## 2024-10-02 NOTE — ED PROVIDER NOTE - OBJECTIVE STATEMENT
73-year-old male; with PMH significant for HTN, HLD, NJ, COPD, DM; now presenting with shortness of breath.  Patient reports awakening this morning with acute onset shortness of breath and chest pressure.  Patient reports awakening with shortness of breath with chest pressure–substernal, pressure, nonradiating, nonexertional, with mild diaphoresis.  Denies lightheadedness.  Denies palpitations.  Denies nausea or vomiting.  Denies cough.  Denies fever, chills, sweats.

## 2024-10-02 NOTE — CONSULT NOTE ADULT - NS ATTEND AMEND GEN_ALL_CORE FT
examined at bed side, feels fine now, had cp suspicious for angina, had cath years ago with non-obstr CAD, rest NST images spo far ok, TTE ok, neg trops, ecg no ischemia.   Will follow up on stress NST images.

## 2024-10-03 ENCOUNTER — RESULT REVIEW (OUTPATIENT)
Age: 74
End: 2024-10-03

## 2024-10-03 ENCOUNTER — TRANSCRIPTION ENCOUNTER (OUTPATIENT)
Age: 74
End: 2024-10-03

## 2024-10-03 VITALS
RESPIRATION RATE: 20 BRPM | HEART RATE: 81 BPM | SYSTOLIC BLOOD PRESSURE: 135 MMHG | DIASTOLIC BLOOD PRESSURE: 74 MMHG | TEMPERATURE: 98 F | OXYGEN SATURATION: 90 %

## 2024-10-03 DIAGNOSIS — I20.9 ANGINA PECTORIS, UNSPECIFIED: ICD-10-CM

## 2024-10-03 DIAGNOSIS — I20.0 UNSTABLE ANGINA: ICD-10-CM

## 2024-10-03 LAB
A1C WITH ESTIMATED AVERAGE GLUCOSE RESULT: 6.3 % — HIGH (ref 4–5.6)
ANION GAP SERPL CALC-SCNC: 11 MMOL/L — SIGNIFICANT CHANGE UP (ref 5–17)
APPEARANCE UR: CLEAR — SIGNIFICANT CHANGE UP
BACTERIA # UR AUTO: NEGATIVE /HPF — SIGNIFICANT CHANGE UP
BASOPHILS # BLD AUTO: 0.06 K/UL — SIGNIFICANT CHANGE UP (ref 0–0.2)
BASOPHILS NFR BLD AUTO: 0.5 % — SIGNIFICANT CHANGE UP (ref 0–2)
BILIRUB UR-MCNC: NEGATIVE — SIGNIFICANT CHANGE UP
BUN SERPL-MCNC: 25.2 MG/DL — HIGH (ref 8–20)
CALCIUM SERPL-MCNC: 10.3 MG/DL — SIGNIFICANT CHANGE UP (ref 8.4–10.5)
CAST: 0 /LPF — SIGNIFICANT CHANGE UP (ref 0–4)
CHLORIDE SERPL-SCNC: 102 MMOL/L — SIGNIFICANT CHANGE UP (ref 96–108)
CHOLEST SERPL-MCNC: 134 MG/DL — SIGNIFICANT CHANGE UP
CO2 SERPL-SCNC: 29 MMOL/L — SIGNIFICANT CHANGE UP (ref 22–29)
COLOR SPEC: YELLOW — SIGNIFICANT CHANGE UP
CREAT SERPL-MCNC: 1.75 MG/DL — HIGH (ref 0.5–1.3)
DIFF PNL FLD: NEGATIVE — SIGNIFICANT CHANGE UP
EGFR: 40 ML/MIN/1.73M2 — LOW
EOSINOPHIL # BLD AUTO: 0.25 K/UL — SIGNIFICANT CHANGE UP (ref 0–0.5)
EOSINOPHIL NFR BLD AUTO: 2.1 % — SIGNIFICANT CHANGE UP (ref 0–6)
ESTIMATED AVERAGE GLUCOSE: 134 MG/DL — HIGH (ref 68–114)
FLUAV AG NPH QL: SIGNIFICANT CHANGE UP
FLUBV AG NPH QL: SIGNIFICANT CHANGE UP
GLUCOSE BLDC GLUCOMTR-MCNC: 103 MG/DL — HIGH (ref 70–99)
GLUCOSE BLDC GLUCOMTR-MCNC: 116 MG/DL — HIGH (ref 70–99)
GLUCOSE SERPL-MCNC: 112 MG/DL — HIGH (ref 70–99)
GLUCOSE UR QL: NEGATIVE MG/DL — SIGNIFICANT CHANGE UP
HCT VFR BLD CALC: 42.3 % — SIGNIFICANT CHANGE UP (ref 39–50)
HDLC SERPL-MCNC: 39 MG/DL — LOW
HGB BLD-MCNC: 13.5 G/DL — SIGNIFICANT CHANGE UP (ref 13–17)
IMM GRANULOCYTES NFR BLD AUTO: 0.5 % — SIGNIFICANT CHANGE UP (ref 0–0.9)
KETONES UR-MCNC: NEGATIVE MG/DL — SIGNIFICANT CHANGE UP
LACTATE SERPL-SCNC: 1.3 MMOL/L — SIGNIFICANT CHANGE UP (ref 0.5–2)
LEUKOCYTE ESTERASE UR-ACNC: NEGATIVE — SIGNIFICANT CHANGE UP
LIPID PNL WITH DIRECT LDL SERPL: 73 MG/DL — SIGNIFICANT CHANGE UP
LYMPHOCYTES # BLD AUTO: 1.97 K/UL — SIGNIFICANT CHANGE UP (ref 1–3.3)
LYMPHOCYTES # BLD AUTO: 16.2 % — SIGNIFICANT CHANGE UP (ref 13–44)
MCHC RBC-ENTMCNC: 29.7 PG — SIGNIFICANT CHANGE UP (ref 27–34)
MCHC RBC-ENTMCNC: 31.9 GM/DL — LOW (ref 32–36)
MCV RBC AUTO: 93 FL — SIGNIFICANT CHANGE UP (ref 80–100)
MONOCYTES # BLD AUTO: 0.9 K/UL — SIGNIFICANT CHANGE UP (ref 0–0.9)
MONOCYTES NFR BLD AUTO: 7.4 % — SIGNIFICANT CHANGE UP (ref 2–14)
NEUTROPHILS # BLD AUTO: 8.9 K/UL — HIGH (ref 1.8–7.4)
NEUTROPHILS NFR BLD AUTO: 73.3 % — SIGNIFICANT CHANGE UP (ref 43–77)
NITRITE UR-MCNC: NEGATIVE — SIGNIFICANT CHANGE UP
NON HDL CHOLESTEROL: 95 MG/DL — SIGNIFICANT CHANGE UP
PH UR: 6 — SIGNIFICANT CHANGE UP (ref 5–8)
PLATELET # BLD AUTO: 230 K/UL — SIGNIFICANT CHANGE UP (ref 150–400)
POTASSIUM SERPL-MCNC: 4.4 MMOL/L — SIGNIFICANT CHANGE UP (ref 3.5–5.3)
POTASSIUM SERPL-SCNC: 4.4 MMOL/L — SIGNIFICANT CHANGE UP (ref 3.5–5.3)
PROT UR-MCNC: 30 MG/DL
RBC # BLD: 4.55 M/UL — SIGNIFICANT CHANGE UP (ref 4.2–5.8)
RBC # FLD: 13.5 % — SIGNIFICANT CHANGE UP (ref 10.3–14.5)
RBC CASTS # UR COMP ASSIST: 2 /HPF — SIGNIFICANT CHANGE UP (ref 0–4)
RSV RNA NPH QL NAA+NON-PROBE: SIGNIFICANT CHANGE UP
SARS-COV-2 RNA SPEC QL NAA+PROBE: SIGNIFICANT CHANGE UP
SODIUM SERPL-SCNC: 142 MMOL/L — SIGNIFICANT CHANGE UP (ref 135–145)
SP GR SPEC: 1.03 — SIGNIFICANT CHANGE UP (ref 1–1.03)
SQUAMOUS # UR AUTO: 1 /HPF — SIGNIFICANT CHANGE UP (ref 0–5)
TRIGL SERPL-MCNC: 111 MG/DL — SIGNIFICANT CHANGE UP
UROBILINOGEN FLD QL: 0.2 MG/DL — SIGNIFICANT CHANGE UP (ref 0.2–1)
WBC # BLD: 12.14 K/UL — HIGH (ref 3.8–10.5)
WBC # FLD AUTO: 12.14 K/UL — HIGH (ref 3.8–10.5)
WBC UR QL: 3 /HPF — SIGNIFICANT CHANGE UP (ref 0–5)

## 2024-10-03 PROCEDURE — 93018 CV STRESS TEST I&R ONLY: CPT

## 2024-10-03 PROCEDURE — 80048 BASIC METABOLIC PNL TOTAL CA: CPT

## 2024-10-03 PROCEDURE — A9500: CPT

## 2024-10-03 PROCEDURE — 93005 ELECTROCARDIOGRAM TRACING: CPT

## 2024-10-03 PROCEDURE — 78452 HT MUSCLE IMAGE SPECT MULT: CPT | Mod: MC

## 2024-10-03 PROCEDURE — 82962 GLUCOSE BLOOD TEST: CPT

## 2024-10-03 PROCEDURE — 93306 TTE W/DOPPLER COMPLETE: CPT

## 2024-10-03 PROCEDURE — 76775 US EXAM ABDO BACK WALL LIM: CPT

## 2024-10-03 PROCEDURE — 99223 1ST HOSP IP/OBS HIGH 75: CPT | Mod: AI

## 2024-10-03 PROCEDURE — 83880 ASSAY OF NATRIURETIC PEPTIDE: CPT

## 2024-10-03 PROCEDURE — 80053 COMPREHEN METABOLIC PANEL: CPT

## 2024-10-03 PROCEDURE — 83605 ASSAY OF LACTIC ACID: CPT

## 2024-10-03 PROCEDURE — 87040 BLOOD CULTURE FOR BACTERIA: CPT

## 2024-10-03 PROCEDURE — 85730 THROMBOPLASTIN TIME PARTIAL: CPT

## 2024-10-03 PROCEDURE — 71045 X-RAY EXAM CHEST 1 VIEW: CPT

## 2024-10-03 PROCEDURE — 93306 TTE W/DOPPLER COMPLETE: CPT | Mod: 26

## 2024-10-03 PROCEDURE — 76775 US EXAM ABDO BACK WALL LIM: CPT | Mod: 26

## 2024-10-03 PROCEDURE — 81001 URINALYSIS AUTO W/SCOPE: CPT

## 2024-10-03 PROCEDURE — 83036 HEMOGLOBIN GLYCOSYLATED A1C: CPT

## 2024-10-03 PROCEDURE — 99285 EMERGENCY DEPT VISIT HI MDM: CPT | Mod: 25

## 2024-10-03 PROCEDURE — 94640 AIRWAY INHALATION TREATMENT: CPT

## 2024-10-03 PROCEDURE — 93016 CV STRESS TEST SUPVJ ONLY: CPT

## 2024-10-03 PROCEDURE — 84484 ASSAY OF TROPONIN QUANT: CPT

## 2024-10-03 PROCEDURE — 93017 CV STRESS TEST TRACING ONLY: CPT

## 2024-10-03 PROCEDURE — 78452 HT MUSCLE IMAGE SPECT MULT: CPT | Mod: 26

## 2024-10-03 PROCEDURE — 80061 LIPID PANEL: CPT

## 2024-10-03 PROCEDURE — 71275 CT ANGIOGRAPHY CHEST: CPT | Mod: MC

## 2024-10-03 PROCEDURE — 96374 THER/PROPH/DIAG INJ IV PUSH: CPT

## 2024-10-03 PROCEDURE — 85025 COMPLETE CBC W/AUTO DIFF WBC: CPT

## 2024-10-03 PROCEDURE — 36415 COLL VENOUS BLD VENIPUNCTURE: CPT

## 2024-10-03 PROCEDURE — 87637 SARSCOV2&INF A&B&RSV AMP PRB: CPT

## 2024-10-03 PROCEDURE — 85610 PROTHROMBIN TIME: CPT

## 2024-10-03 PROCEDURE — 83735 ASSAY OF MAGNESIUM: CPT

## 2024-10-03 RX ORDER — PANTOPRAZOLE SODIUM 40 MG/1
40 TABLET, DELAYED RELEASE ORAL
Refills: 0 | Status: DISCONTINUED | OUTPATIENT
Start: 2024-10-03 | End: 2024-10-03

## 2024-10-03 RX ORDER — ASPIRIN 325 MG
81 TABLET ORAL DAILY
Refills: 0 | Status: DISCONTINUED | OUTPATIENT
Start: 2024-10-03 | End: 2024-10-03

## 2024-10-03 RX ORDER — SODIUM CHLORIDE IRRIG SOLUTION 0.9 %
1000 SOLUTION, IRRIGATION IRRIGATION
Refills: 0 | Status: DISCONTINUED | OUTPATIENT
Start: 2024-10-03 | End: 2024-10-03

## 2024-10-03 RX ORDER — ALCOHOL ANTISEPTIC PADS
15 PADS, MEDICATED (EA) TOPICAL ONCE
Refills: 0 | Status: DISCONTINUED | OUTPATIENT
Start: 2024-10-03 | End: 2024-10-03

## 2024-10-03 RX ORDER — GLUCAGON INJECTION, SOLUTION 0.5 MG/.1ML
1 INJECTION, SOLUTION SUBCUTANEOUS ONCE
Refills: 0 | Status: DISCONTINUED | OUTPATIENT
Start: 2024-10-03 | End: 2024-10-03

## 2024-10-03 RX ORDER — SERTRALINE HYDROCHLORIDE 100 MG/1
50 TABLET, FILM COATED ORAL DAILY
Refills: 0 | Status: DISCONTINUED | OUTPATIENT
Start: 2024-10-03 | End: 2024-10-03

## 2024-10-03 RX ORDER — INSULIN LISPRO 100/ML
VIAL (ML) SUBCUTANEOUS
Refills: 0 | Status: DISCONTINUED | OUTPATIENT
Start: 2024-10-03 | End: 2024-10-03

## 2024-10-03 RX ORDER — ALCOHOL ANTISEPTIC PADS
25 PADS, MEDICATED (EA) TOPICAL ONCE
Refills: 0 | Status: DISCONTINUED | OUTPATIENT
Start: 2024-10-03 | End: 2024-10-03

## 2024-10-03 RX ORDER — ATORVASTATIN CALCIUM 10 MG/1
80 TABLET, FILM COATED ORAL AT BEDTIME
Refills: 0 | Status: DISCONTINUED | OUTPATIENT
Start: 2024-10-03 | End: 2024-10-03

## 2024-10-03 RX ORDER — LOSARTAN POTASSIUM 100 MG/1
25 TABLET, FILM COATED ORAL DAILY
Refills: 0 | Status: DISCONTINUED | OUTPATIENT
Start: 2024-10-03 | End: 2024-10-03

## 2024-10-03 RX ORDER — INFLUENZA VIRUS VACCINE 15; 15; 15; 15 UG/.5ML; UG/.5ML; UG/.5ML; UG/.5ML
0.5 SUSPENSION INTRAMUSCULAR ONCE
Refills: 0 | Status: DISCONTINUED | OUTPATIENT
Start: 2024-10-03 | End: 2024-10-03

## 2024-10-03 RX ORDER — ASPIRIN 325 MG
1 TABLET ORAL
Qty: 30 | Refills: 0
Start: 2024-10-03

## 2024-10-03 RX ORDER — TIOTROPIUM BROMIDE AND OLODATEROL 3.124; 2.736 UG/1; UG/1
2 SPRAY, METERED RESPIRATORY (INHALATION) DAILY
Refills: 0 | Status: DISCONTINUED | OUTPATIENT
Start: 2024-10-03 | End: 2024-10-03

## 2024-10-03 RX ORDER — ALCOHOL ANTISEPTIC PADS
12.5 PADS, MEDICATED (EA) TOPICAL ONCE
Refills: 0 | Status: DISCONTINUED | OUTPATIENT
Start: 2024-10-03 | End: 2024-10-03

## 2024-10-03 RX ORDER — TIOTROPIUM BROMIDE AND OLODATEROL 3.124; 2.736 UG/1; UG/1
2 SPRAY, METERED RESPIRATORY (INHALATION)
Refills: 0 | DISCHARGE

## 2024-10-03 RX ORDER — LOSARTAN POTASSIUM 100 MG/1
1 TABLET, FILM COATED ORAL
Refills: 0 | DISCHARGE

## 2024-10-03 RX ORDER — SERTRALINE HYDROCHLORIDE 100 MG/1
1 TABLET, FILM COATED ORAL
Refills: 0 | DISCHARGE

## 2024-10-03 RX ADMIN — LOSARTAN POTASSIUM 25 MILLIGRAM(S): 100 TABLET, FILM COATED ORAL at 13:15

## 2024-10-03 RX ADMIN — Medication 81 MILLIGRAM(S): at 13:06

## 2024-10-03 RX ADMIN — PANTOPRAZOLE SODIUM 40 MILLIGRAM(S): 40 TABLET, DELAYED RELEASE ORAL at 13:06

## 2024-10-03 RX ADMIN — Medication 5000 UNIT(S): at 13:06

## 2024-10-03 RX ADMIN — SERTRALINE HYDROCHLORIDE 50 MILLIGRAM(S): 100 TABLET, FILM COATED ORAL at 13:06

## 2024-10-03 RX ADMIN — TIOTROPIUM BROMIDE AND OLODATEROL 2 PUFF(S): 3.124; 2.736 SPRAY, METERED RESPIRATORY (INHALATION) at 13:41

## 2024-10-03 NOTE — DISCHARGE NOTE PROVIDER - CARE PROVIDER_API CALL
Galen Casillas  Cardiovascular Disease  [address] [place] [id number]  Phone: [phone]  Fax: [phone]  Follow Up Time: 2 weeks

## 2024-10-03 NOTE — H&P ADULT - NSHPLABSRESULTS_GEN_ALL_CORE
13.5   12.14 )-----------( 230      ( 03 Oct 2024 05:58 )             42.3         10-03    142  |  102  |  25.2[H]  ----------------------------<  112[H]  4.4   |  29.0  |  1.75[H]        Ca    10.3      03 Oct 2024 05:58  Mg     2.2     10-02    TPro  6.9  /  Alb  3.9  /  TBili  0.6  /  DBili  x   /  AST  17  /  ALT  11  /  AlkPhos  109  10-02 13.5   12.14 )-----------( 230      ( 03 Oct 2024 05:58 )             42.3         10-03    142  |  102  |  25.2[H]  ----------------------------<  112[H]  4.4   |  29.0  |  1.75[H]        Ca    10.3      03 Oct 2024 05:58  Mg     2.2     10-02    TPro  6.9  /  Alb  3.9  /  TBili  0.6  /  DBili  x   /  AST  17  /  ALT  11  /  AlkPhos  109  10-02      < from: CT Angio Chest PE Protocol w/ IV Cont (10.02.24 @ 20:48) >      1.  No pulmonary embolism.  2.  No change in the bilateral reticular opacities, calcified nodules are   mosaic attenuation of the lungs.      < end of copied text >

## 2024-10-03 NOTE — DISCHARGE NOTE NURSING/CASE MANAGEMENT/SOCIAL WORK - PATIENT PORTAL LINK FT
You can access the FollowMyHealth Patient Portal offered by [facility] by registering at the following website: [email or web address]. By joining [employer]’s FollowMyHealth portal, you will also be able to view your health information using other applications (apps) compatible with our system.

## 2024-10-03 NOTE — DISCHARGE NOTE PROVIDER - HOSPITAL COURSE
73 y/o M w/ PMH of COPD (former smoker; quit >20+ yrs ago; not on baseline O2), diverticulosis (w/ bleed 07/2023), spontaneous L-pneumothorax (>20 yrs ago), HTN, HLD, DMII, obesity, anxiet/depression presents c/o substernal chest pressure that woke him up from his sleep w/ associated w/ sob, palpitations.  Trops flat 26-->27 and .  EKG w/ no acute ischmic changes. CTA negative for PE and chronic b/l reic opacities, calcified nodules and mosaic attenuation of lungs unchanged.  TTE w/ pEF.  Stress test 10/03 negative.  Will switch losartan to Nifedipine XL for both better control and antianginal as per cardiology.  Will hold off on BB as HR can not tolerate.  c/w ASA 81 and high dose statin.  f/u w/ cardio in 10 days to reassess.  pt also noted to have DENIS on CKD.  No signs of retention or UTI.  Will f/u with nephro outpt in 1 week to reassess.  SIRS on admission but no WBC down trending and w/out L-shift.  Nontoxic appearing, UA and viral panel negative, CTA w/ chronic b/l retic opacities, calcified nodules and mosaic attenuation of lungs unchanged.  Pt medically stable for discharge.

## 2024-10-03 NOTE — PATIENT PROFILE ADULT - FALL HARM RISK - HARM RISK INTERVENTIONS

## 2024-10-03 NOTE — H&P ADULT - NSHPPHYSICALEXAM_GEN_ALL_CORE
GENERAL: pt examined bedside, laying comfortably in bed in NAD  HEENT: NC/AT, moist oral mucosa, clear conjunctiva, sclera nonicteric  RESPIRATORY: Normal respiratory effort; CTA b/l, no wheezing, rhonchi, rales  CARDIOVASCULAR: RRR, normal S1 and S2, no murmur/rub/gallop  ABDOMEN: soft, NT/ND, normoactive bowel sounds, no rebound/guarding  MSK: No joint deformities, edema, erythema  EXTREMITIES: No cynaosis, no clubbing, no lower extremity edema; Peripheral pulses are 2+ bilaterally  PSYCH: affect appropriate and cooperative  NEUROLOGY: A+O to person, place, and time, no focal neurologic deficits appreciated  SKIN: No rashes or no palpable lesions GENERAL: pt examined bedside, laying comfortably in bed in NAD  HEENT: NC/AT, moist oral mucosa, clear conjunctiva, sclera nonicteric  CHEST: L-chest pain reproducible to palpation  RESPIRATORY: Normal respiratory effort, no wheezing, rhonchi, rales  CARDIOVASCULAR: RRR, normal S1 and S2, no murmur/rub/gallop  ABDOMEN: soft, NT/ND, +bowel sounds, no rebound/guarding  MSK: No joint deformities, edema, erythema  EXTREMITIES: No cynaosis, no clubbing, no lower extremity edema  PSYCH: affect appropriate and cooperative  NEUROLOGY: A+O to person, place, and time, no focal neurologic deficits appreciated  SKIN: No rashes or no palpable lesions

## 2024-10-03 NOTE — H&P ADULT - ASSESSMENT
73 y/o M w/ PMH of COPD (former smoker; quit >20+ yrs ago; not on baseline O2), spontaneous L-pneumothorax (>20 yrs ago), HTN, HLD, DMII, obesity, anxiet/depression presents c/o substernal chest pressure that woke him up from his sleep 10/02 in AM.  Symptoms were associated w/ sob, palpitations but no diaphoresis,  On arrival pt was in sinus tachy in 120's, tachypnic in 20's and placed on O2 for comfort.  Clinically euvolemic w/ reproducible L-chest pain to palpation.  Initial w/u significant for leukocytosis w/ no L-shift, Cr 1.69, Trops flat 26-->27.  EKG w/out acute ischemic changes.  Will admit to r/o ACS.       Chest pain, r/o   - Trops flat 26-->27 and    - EKG w/ no acute ischemic changes   - Chest pressure is exacerbated by palpation   - CTA negative for PE and chronic b/l reic opacities, calcified nodules and mosaic attenuation of lungs unchanged   - s/p 324mg ASA and 40mg IV x1 dose in ED   - A1c 6.3 and lipid panel reviewed    - TTE ordered to assess valves, LVEF and for WMAs  - c/w low dose ASA and statin therapy  - Will go for stress test today as per cardio therefore keep NPO  - Maintain K>4 and Mg>2  - Monitor on telemetry  - Cardio consulted and recs noted       DENIS on CKD IIIb  - Baseline Cr 1.5 and currently 1.75  - Will check UA and renal US   - Bladder scan q4h for today to assess for retention   - Monitor i/O's, renal function and lytes   - If Cr notably worsens may need to hold ARB but for now ok to continue       SIRS w/ tachycardia, tachypnea and leukocytosis   - Fit SIRS criteria on arrival but tachycardia and tachypnea resolved  - Down trending on AM labs and still w/out L-shift  - Clinically nontoxic and afebrile   - Placed on O2 on arrival for comfort but no hypoxia reported   - Will order infectious w/u including UA, Bcxs and viral panel but will monitor off abx  - CTA w/ chronic b/l retic opacities, calcified nodules and mosaic attenuation of lungs unchanged   - Defer procal as unreliable in setting of CKD  - Trend CBC and monitor temperature       DM II  - A1c 6.3  - Hold metformin while hospitalized  - ISS and hypoglycemic protocol in place       COPD  - Not in acute exacerbation   - Not on O2 at baseline   - c/w inhaler regimen   - Duonebs if needed  - Maintain O2 88-92%      HTN / HLD  - Resume losartan   - c/w high intensity statin       Anxiet/depression   - Resume zoloft       VTE ppx: heparin sq    Dispo: Acute.  Pending stress test w/ cardio.  Ancipitate d/c in 1-3 days.   75 y/o M w/ PMH of COPD (former smoker; quit >20+ yrs ago; not on baseline O2), diverticulosis (w/ bleed in past), spontaneous L-pneumothorax (>20 yrs ago), HTN, HLD, DMII, obesity, anxiet/depression presents c/o substernal chest pressure that woke him up from his sleep 10/02 in AM.  Symptoms were associated w/ sob, palpitations but no diaphoresis,  On arrival pt was in sinus tachy in 120's, tachypnic in 20's and placed on O2 for comfort.  Clinically euvolemic w/ reproducible L-chest pain to palpation.  Initial w/u significant for leukocytosis w/ no L-shift, Cr 1.69, Trops flat 26-->27.  EKG w/out acute ischemic changes.  Will admit to r/o ACS.       Chest pain, r/o ACS  - Trops flat 26-->27 and    - EKG w/ no acute ischemic changes   - Chest pressure is exacerbated by palpation   - CTA negative for PE and chronic b/l reic opacities, calcified nodules and mosaic attenuation of lungs unchanged   - s/p 324mg ASA and 40mg IV x1 dose in ED   - A1c 6.3 and lipid panel reviewed    - TTE ordered to assess valves, LVEF and for WMAs  - c/w low dose ASA and statin therapy  - Will go for stress test today as per cardio therefore keep NPO  - Maintain K>4 and Mg>2  - Monitor on telemetry  - Cardio consulted and recs noted       DENIS on CKD IIIb  - Baseline Cr 1.5 and currently 1.75  - Will check UA and renal US   - Bladder scan q4h for today to assess for retention   - Monitor i/O's, renal function and lytes   - If Cr notably worsens may need to hold ARB but for now ok to continue       SIRS w/ tachycardia, tachypnea and leukocytosis   - Fit SIRS criteria on arrival but tachycardia and tachypnea resolved  - Down trending on AM labs and still w/out L-shift  - Clinically nontoxic and afebrile   - Placed on O2 on arrival for comfort but no hypoxia reported   - Will order infectious w/u including UA, Bcxs and viral panel but will monitor off abx  - CTA w/ chronic b/l retic opacities, calcified nodules and mosaic attenuation of lungs unchanged   - Defer procal as unreliable in setting of CKD  - Trend CBC and monitor temperature       DM II  - A1c 6.3  - Hold metformin while hospitalized  - ISS and hypoglycemic protocol in place       COPD  - Not in acute exacerbation   - Not on O2 at baseline   - c/w inhaler regimen   - Duonebs if needed  - Maintain O2 88-92%      HTN / HLD  - Resume losartan   - c/w high intensity statin       Anxiet/depression   - Resume zoloft       VTE ppx: heparin sq    Dispo: Acute.  Pending stress test w/ cardio.  Ancipitate d/c in 1-3 days.   75 y/o M w/ PMH of COPD (former smoker; quit >20+ yrs ago; not on baseline O2), diverticulosis (w/ bleed 07/2023), spontaneous L-pneumothorax (>20 yrs ago), HTN, HLD, DMII, obesity, anxiet/depression presents c/o substernal chest pressure that woke him up from his sleep 10/02 in AM.  Symptoms were associated w/ sob, palpitations but no diaphoresis,  On arrival pt was in sinus tachy in 120's, tachypnic in 20's and placed on O2 for comfort.  Clinically euvolemic w/ reproducible L-chest pain to palpation.  Initial w/u significant for leukocytosis w/ no L-shift, Cr 1.69, Trops flat 26-->27.  EKG w/out acute ischemic changes.  Will admit to r/o ACS.       Chest pain, r/o ACS  - Trops flat 26-->27 and    - EKG w/ no acute ischemic changes   - Chest pressure is exacerbated by palpation   - CTA negative for PE and chronic b/l reic opacities, calcified nodules and mosaic attenuation of lungs unchanged   - s/p 324mg ASA and 40mg IV x1 dose in ED   - A1c 6.3 and lipid panel reviewed    - TTE ordered to assess valves, LVEF and for WMAs  - c/w low dose ASA and statin therapy  - Will go for stress test today as per cardio therefore keep NPO  - Maintain K>4 and Mg>2  - Monitor on telemetry  - Cardio consulted and recs noted       DENIS on CKD IIIb  - Baseline Cr 1.5 and currently 1.75  - Will check UA and renal US   - Bladder scan q4h for today to assess for retention   - Monitor i/O's, renal function and lytes   - If Cr notably worsens may need to hold ARB but for now ok to continue       SIRS w/ tachycardia, tachypnea and leukocytosis   - Fit SIRS criteria on arrival but tachycardia and tachypnea resolved  - Down trending on AM labs and still w/out L-shift  - Clinically nontoxic and afebrile   - Placed on O2 on arrival for comfort but no hypoxia reported   - Will order infectious w/u including UA, Bcxs and viral panel but will monitor off abx  - CTA w/ chronic b/l retic opacities, calcified nodules and mosaic attenuation of lungs unchanged   - Defer procal as unreliable in setting of CKD  - Trend CBC and monitor temperature       DM II  - A1c 6.3  - Hold metformin while hospitalized  - ISS and hypoglycemic protocol in place       COPD  - Not in acute exacerbation   - Not on O2 at baseline   - c/w inhaler regimen   - Duonebs if needed  - Maintain O2 88-92%      HTN / HLD  - Resume losartan   - c/w high intensity statin       Anxiet/depression   - Resume zoloft       VTE ppx: heparin sq    Dispo: Acute.  Pending stress test w/ cardio.  Ancipitate d/c in 1-3 days.

## 2024-10-03 NOTE — DISCHARGE NOTE PROVIDER - NSDCMRMEDTOKEN_GEN_ALL_CORE_FT
aspirin 81 mg oral tablet, chewable: 1 tab(s) orally once a day  atorvastatin 40 mg oral tablet: 1 tab(s) orally once a day (at bedtime)  metFORMIN 500 mg oral tablet: 1 tab(s) orally 2 times a day  NIFEdipine (Eqv-Procardia XL) 30 mg oral tablet, extended release: 1 tab(s) orally once a day  sertraline 50 mg oral tablet: 1 tab(s) orally once a day  Stiolto Respimat 10 ACT 2.5 mcg-2.5 mcg/inh inhalation aerosol: 2 puff(s) inhaled once a day

## 2024-10-03 NOTE — CHART NOTE - NSCHARTNOTEFT_GEN_A_CORE
NST reviewed, negative for ischemia or infarction, TTE ok as well.   Trops neg, ecg only TWI.  Pain free, yet recent episode suspicious for angina.  Suggest switching losartan to Nifedipine XL for both better control and antianginal. Would love BB but his HR has been low during this hospital stay.   Cont ASA 81 and high dose statin.  Follow up with cardiology within 10 days to re assess.   Signing off.

## 2024-10-03 NOTE — H&P ADULT - HISTORY OF PRESENT ILLNESS
75 y/o M w/ PMH of COPD (former smoker; quit >20+ yrs ago; not on baseline O2), spontaneous L-pneumothorax (>20 yrs ago), HTN, HLD, DMII, obesity, anxiet/depression presents c/o substernal chest pressure that woke him up from his sleep 10/02 in AM.  Symptoms were associated w/ sob, palpitations but no diaphoresis,  Pts symptoms improved but did not resolve so he did not immediately come to hospital but eventually came in to hospital.  The day before symptoms started he was taking A/C's out of windows at home but reports they were relatively light.  Pain is reporducible to palpation on L-side.  He denies LE edema, orthopnea, recent travel, sick contacts, abd pain, N/V, diarrhea, fevers, chills.    75 y/o M w/ PMH of COPD (former smoker; quit >20+ yrs ago; not on baseline O2), diverticulosis (w/ bleed in past), spontaneous L-pneumothorax (>20 yrs ago), HTN, HLD, DMII, obesity, anxiet/depression presents c/o substernal chest pressure that woke him up from his sleep 10/02 in AM.  Symptoms were associated w/ sob, palpitations but no diaphoresis,  Pts symptoms improved but did not resolve so he did not immediately come to hospital but eventually came in to hospital.  The day before symptoms started he was taking A/C's out of windows at home but reports they were relatively light.  Pain is reproducible to palpation on L-side.  He denies LE edema, orthopnea, recent travel, sick contacts, abd pain, N/V, diarrhea, fevers, chills.    75 y/o M w/ PMH of COPD (former smoker; quit >20+ yrs ago; not on baseline O2), diverticulosis (w/ bleed 07/2023), spontaneous L-pneumothorax (>20 yrs ago), HTN, HLD, DMII, obesity, anxiet/depression presents c/o substernal chest pressure that woke him up from his sleep 10/02 in AM.  Symptoms were associated w/ sob, palpitations but no diaphoresis,  Pts symptoms improved but did not resolve so he did not immediately come to hospital but eventually came in to hospital.  The day before symptoms started he was taking A/C's out of windows at home but reports they were relatively light.  Pain is reproducible to palpation on L-side.  He denies LE edema, orthopnea, recent travel, sick contacts, abd pain, N/V, diarrhea, fevers, chills.

## 2024-10-03 NOTE — DISCHARGE NOTE PROVIDER - NSDCCPCAREPLAN_GEN_ALL_CORE_FT
PRINCIPAL DISCHARGE DIAGNOSIS  Diagnosis: Unstable angina pectoris  Assessment and Plan of Treatment: Nuclear stress test negative.  Echo w/ preserved ejection fraction.  Per cardiology recommendations please discontinue losartan and start Nifedipine XL.  Continue with ASA and high dose statin.  Please follow up with cardio in 10 days to reassess.          SECONDARY DISCHARGE DIAGNOSES  Diagnosis: Acute kidney injury superimposed on CKD  Assessment and Plan of Treatment: No evidence of retention or UTI.  Please follow up with nephrology or PMD in 1 week to have renal function rechecked.

## 2024-10-03 NOTE — DISCHARGE NOTE PROVIDER - NSDCFUSCHEDAPPT_GEN_ALL_CORE_FT
Cody Burton  [facility] Physician [facility]  [address] [facility]  Scheduled Appointment: 10/21/2024    Fede Wilkinson  [facility] Physician [facility]  [address]  Scheduled Appointment: 12/12/2024

## 2024-10-07 ENCOUNTER — OUTPATIENT (OUTPATIENT)
Dept: OUTPATIENT SERVICES | Facility: HOSPITAL | Age: 74
LOS: 1 days | End: 2024-10-07

## 2024-10-07 ENCOUNTER — APPOINTMENT (OUTPATIENT)
Dept: ULTRASOUND IMAGING | Facility: CLINIC | Age: 74
End: 2024-10-07
Payer: MEDICARE

## 2024-10-07 DIAGNOSIS — Z98.890 OTHER SPECIFIED POSTPROCEDURAL STATES: Chronic | ICD-10-CM

## 2024-10-07 DIAGNOSIS — R06.09 OTHER FORMS OF DYSPNEA: ICD-10-CM

## 2024-10-07 PROCEDURE — 93970 EXTREMITY STUDY: CPT | Mod: 26

## 2024-10-08 ENCOUNTER — APPOINTMENT (OUTPATIENT)
Dept: PULMONOLOGY | Facility: CLINIC | Age: 74
End: 2024-10-08
Payer: MEDICARE

## 2024-10-08 VITALS — OXYGEN SATURATION: 94 %

## 2024-10-08 VITALS
DIASTOLIC BLOOD PRESSURE: 62 MMHG | RESPIRATION RATE: 16 BRPM | BODY MASS INDEX: 37.67 KG/M2 | HEART RATE: 71 BPM | OXYGEN SATURATION: 93 % | HEIGHT: 67 IN | SYSTOLIC BLOOD PRESSURE: 140 MMHG | WEIGHT: 240 LBS

## 2024-10-08 DIAGNOSIS — R06.09 OTHER FORMS OF DYSPNEA: ICD-10-CM

## 2024-10-08 DIAGNOSIS — G47.33 OBSTRUCTIVE SLEEP APNEA (ADULT) (PEDIATRIC): ICD-10-CM

## 2024-10-08 DIAGNOSIS — E66.9 OBESITY, UNSPECIFIED: ICD-10-CM

## 2024-10-08 DIAGNOSIS — J44.9 CHRONIC OBSTRUCTIVE PULMONARY DISEASE, UNSPECIFIED: ICD-10-CM

## 2024-10-08 DIAGNOSIS — J84.9 INTERSTITIAL PULMONARY DISEASE, UNSPECIFIED: ICD-10-CM

## 2024-10-08 LAB
CULTURE RESULTS: SIGNIFICANT CHANGE UP
CULTURE RESULTS: SIGNIFICANT CHANGE UP
SPECIMEN SOURCE: SIGNIFICANT CHANGE UP
SPECIMEN SOURCE: SIGNIFICANT CHANGE UP

## 2024-10-08 PROCEDURE — 99215 OFFICE O/P EST HI 40 MIN: CPT

## 2024-10-08 PROCEDURE — G2211 COMPLEX E/M VISIT ADD ON: CPT

## 2024-10-08 RX ORDER — PREDNISONE 10 MG/1
10 TABLET ORAL
Qty: 30 | Refills: 3 | Status: ACTIVE | COMMUNITY
Start: 2024-10-08 | End: 1900-01-01

## 2024-10-08 RX ORDER — DOXYCYCLINE HYCLATE 100 MG/1
100 TABLET ORAL TWICE DAILY
Qty: 10 | Refills: 4 | Status: ACTIVE | COMMUNITY
Start: 2024-10-08 | End: 1900-01-01

## 2024-10-08 RX ORDER — NIFEDIPINE 30 MG/1
30 TABLET, EXTENDED RELEASE ORAL
Refills: 0 | Status: ACTIVE | COMMUNITY

## 2024-10-08 RX ORDER — KRILL/OM-3/DHA/EPA/PHOSPHO/AST 1000-230MG
CAPSULE ORAL
Refills: 0 | Status: ACTIVE | COMMUNITY

## 2024-10-09 ENCOUNTER — NON-APPOINTMENT (OUTPATIENT)
Age: 74
End: 2024-10-09

## 2024-10-21 ENCOUNTER — APPOINTMENT (OUTPATIENT)
Dept: INTERNAL MEDICINE | Facility: CLINIC | Age: 74
End: 2024-10-21
Payer: MEDICARE

## 2024-10-21 VITALS
DIASTOLIC BLOOD PRESSURE: 67 MMHG | WEIGHT: 240 LBS | SYSTOLIC BLOOD PRESSURE: 104 MMHG | BODY MASS INDEX: 37.67 KG/M2 | HEART RATE: 75 BPM | HEIGHT: 67 IN

## 2024-10-21 DIAGNOSIS — E11.49 TYPE 2 DIABETES MELLITUS WITH OTHER DIABETIC NEUROLOGICAL COMPLICATION: ICD-10-CM

## 2024-10-21 DIAGNOSIS — N18.31 CHRONIC KIDNEY DISEASE, STAGE 3A: ICD-10-CM

## 2024-10-21 DIAGNOSIS — I20.89 OTHER FORMS OF ANGINA PECTORIS: ICD-10-CM

## 2024-10-21 DIAGNOSIS — J84.9 INTERSTITIAL PULMONARY DISEASE, UNSPECIFIED: ICD-10-CM

## 2024-10-21 DIAGNOSIS — I10 ESSENTIAL (PRIMARY) HYPERTENSION: ICD-10-CM

## 2024-10-21 DIAGNOSIS — Z00.00 ENCOUNTER FOR GENERAL ADULT MEDICAL EXAMINATION W/OUT ABNORMAL FINDINGS: ICD-10-CM

## 2024-10-21 PROCEDURE — G0439: CPT

## 2024-10-21 PROCEDURE — 36415 COLL VENOUS BLD VENIPUNCTURE: CPT

## 2024-10-21 RX ORDER — RANOLAZINE 500 MG/1
500 TABLET, EXTENDED RELEASE ORAL
Qty: 180 | Refills: 1 | Status: ACTIVE | COMMUNITY
Start: 2024-10-21

## 2024-10-24 LAB
ANION GAP SERPL CALC-SCNC: 13 MMOL/L
BUN SERPL-MCNC: 23 MG/DL
CALCIUM SERPL-MCNC: 10.1 MG/DL
CHLORIDE SERPL-SCNC: 100 MMOL/L
CO2 SERPL-SCNC: 26 MMOL/L
CREAT SERPL-MCNC: 1.85 MG/DL
EGFR: 37 ML/MIN/1.73M2
GLUCOSE SERPL-MCNC: 106 MG/DL
POTASSIUM SERPL-SCNC: 5.2 MMOL/L
SODIUM SERPL-SCNC: 139 MMOL/L

## 2024-12-10 ENCOUNTER — APPOINTMENT (OUTPATIENT)
Dept: PULMONOLOGY | Facility: CLINIC | Age: 74
End: 2024-12-10
Payer: MEDICARE

## 2024-12-10 ENCOUNTER — INPATIENT (INPATIENT)
Facility: HOSPITAL | Age: 74
LOS: 6 days | Discharge: HOME | DRG: 189 | End: 2024-12-17
Attending: HOSPITALIST | Admitting: STUDENT IN AN ORGANIZED HEALTH CARE EDUCATION/TRAINING PROGRAM
Payer: MEDICARE

## 2024-12-10 VITALS
OXYGEN SATURATION: 86 % | TEMPERATURE: 98 F | HEART RATE: 72 BPM | RESPIRATION RATE: 17 BRPM | DIASTOLIC BLOOD PRESSURE: 70 MMHG | SYSTOLIC BLOOD PRESSURE: 146 MMHG

## 2024-12-10 VITALS — WEIGHT: 247 LBS | HEIGHT: 67.5 IN | BODY MASS INDEX: 38.32 KG/M2

## 2024-12-10 DIAGNOSIS — Z90.49 ACQUIRED ABSENCE OF OTHER SPECIFIED PARTS OF DIGESTIVE TRACT: Chronic | ICD-10-CM

## 2024-12-10 DIAGNOSIS — J96.01 ACUTE RESPIRATORY FAILURE WITH HYPOXIA: ICD-10-CM

## 2024-12-10 DIAGNOSIS — J84.9 INTERSTITIAL PULMONARY DISEASE, UNSPECIFIED: ICD-10-CM

## 2024-12-10 DIAGNOSIS — J44.9 CHRONIC OBSTRUCTIVE PULMONARY DISEASE, UNSPECIFIED: ICD-10-CM

## 2024-12-10 DIAGNOSIS — R06.09 OTHER FORMS OF DYSPNEA: ICD-10-CM

## 2024-12-10 DIAGNOSIS — Z98.890 OTHER SPECIFIED POSTPROCEDURAL STATES: Chronic | ICD-10-CM

## 2024-12-10 LAB
ALBUMIN SERPL ELPH-MCNC: 4.2 G/DL — SIGNIFICANT CHANGE UP (ref 3.3–5.2)
ALP SERPL-CCNC: 122 U/L — HIGH (ref 40–120)
ALT FLD-CCNC: 15 U/L — SIGNIFICANT CHANGE UP
ANION GAP SERPL CALC-SCNC: 13 MMOL/L — SIGNIFICANT CHANGE UP (ref 5–17)
APTT BLD: 27.3 SEC — SIGNIFICANT CHANGE UP (ref 24.5–35.6)
AST SERPL-CCNC: 16 U/L — SIGNIFICANT CHANGE UP
BASOPHILS # BLD AUTO: 0.07 K/UL — SIGNIFICANT CHANGE UP (ref 0–0.2)
BASOPHILS NFR BLD AUTO: 0.5 % — SIGNIFICANT CHANGE UP (ref 0–2)
BILIRUB SERPL-MCNC: 0.6 MG/DL — SIGNIFICANT CHANGE UP (ref 0.4–2)
BUN SERPL-MCNC: 26.7 MG/DL — HIGH (ref 8–20)
CALCIUM SERPL-MCNC: 10 MG/DL — SIGNIFICANT CHANGE UP (ref 8.4–10.5)
CHLORIDE SERPL-SCNC: 100 MMOL/L — SIGNIFICANT CHANGE UP (ref 96–108)
CO2 SERPL-SCNC: 25 MMOL/L — SIGNIFICANT CHANGE UP (ref 22–29)
CREAT SERPL-MCNC: 1.75 MG/DL — HIGH (ref 0.5–1.3)
EGFR: 40 ML/MIN/1.73M2 — LOW
EOSINOPHIL # BLD AUTO: 0.48 K/UL — SIGNIFICANT CHANGE UP (ref 0–0.5)
EOSINOPHIL NFR BLD AUTO: 3.5 % — SIGNIFICANT CHANGE UP (ref 0–6)
FLUAV AG NPH QL: SIGNIFICANT CHANGE UP
FLUBV AG NPH QL: SIGNIFICANT CHANGE UP
GLUCOSE SERPL-MCNC: 90 MG/DL — SIGNIFICANT CHANGE UP (ref 70–99)
HCT VFR BLD CALC: 40.9 % — SIGNIFICANT CHANGE UP (ref 39–50)
HGB BLD-MCNC: 13.1 G/DL — SIGNIFICANT CHANGE UP (ref 13–17)
IMM GRANULOCYTES NFR BLD AUTO: 0.7 % — SIGNIFICANT CHANGE UP (ref 0–0.9)
INR BLD: 0.99 RATIO — SIGNIFICANT CHANGE UP (ref 0.85–1.16)
LYMPHOCYTES # BLD AUTO: 2.84 K/UL — SIGNIFICANT CHANGE UP (ref 1–3.3)
LYMPHOCYTES # BLD AUTO: 20.7 % — SIGNIFICANT CHANGE UP (ref 13–44)
MCHC RBC-ENTMCNC: 29.9 PG — SIGNIFICANT CHANGE UP (ref 27–34)
MCHC RBC-ENTMCNC: 32 G/DL — SIGNIFICANT CHANGE UP (ref 32–36)
MCV RBC AUTO: 93.4 FL — SIGNIFICANT CHANGE UP (ref 80–100)
MONOCYTES # BLD AUTO: 1.09 K/UL — HIGH (ref 0–0.9)
MONOCYTES NFR BLD AUTO: 7.9 % — SIGNIFICANT CHANGE UP (ref 2–14)
NEUTROPHILS # BLD AUTO: 9.14 K/UL — HIGH (ref 1.8–7.4)
NEUTROPHILS NFR BLD AUTO: 66.7 % — SIGNIFICANT CHANGE UP (ref 43–77)
PLATELET # BLD AUTO: 289 K/UL — SIGNIFICANT CHANGE UP (ref 150–400)
POTASSIUM SERPL-MCNC: 4.3 MMOL/L — SIGNIFICANT CHANGE UP (ref 3.5–5.3)
POTASSIUM SERPL-SCNC: 4.3 MMOL/L — SIGNIFICANT CHANGE UP (ref 3.5–5.3)
PROT SERPL-MCNC: 7.4 G/DL — SIGNIFICANT CHANGE UP (ref 6.6–8.7)
PROTHROM AB SERPL-ACNC: 11.2 SEC — SIGNIFICANT CHANGE UP (ref 9.9–13.4)
RBC # BLD: 4.38 M/UL — SIGNIFICANT CHANGE UP (ref 4.2–5.8)
RBC # FLD: 14.6 % — HIGH (ref 10.3–14.5)
RSV RNA NPH QL NAA+NON-PROBE: SIGNIFICANT CHANGE UP
SARS-COV-2 RNA SPEC QL NAA+PROBE: SIGNIFICANT CHANGE UP
SODIUM SERPL-SCNC: 138 MMOL/L — SIGNIFICANT CHANGE UP (ref 135–145)
WBC # BLD: 13.72 K/UL — HIGH (ref 3.8–10.5)
WBC # FLD AUTO: 13.72 K/UL — HIGH (ref 3.8–10.5)

## 2024-12-10 PROCEDURE — 99291 CRITICAL CARE FIRST HOUR: CPT | Mod: GC

## 2024-12-10 PROCEDURE — 94729 DIFFUSING CAPACITY: CPT

## 2024-12-10 PROCEDURE — 71045 X-RAY EXAM CHEST 1 VIEW: CPT | Mod: 26

## 2024-12-10 PROCEDURE — 99223 1ST HOSP IP/OBS HIGH 75: CPT

## 2024-12-10 PROCEDURE — 99215 OFFICE O/P EST HI 40 MIN: CPT | Mod: 25

## 2024-12-10 PROCEDURE — 85018 HEMOGLOBIN: CPT | Mod: QW

## 2024-12-10 PROCEDURE — 71275 CT ANGIOGRAPHY CHEST: CPT | Mod: 26,MC

## 2024-12-10 PROCEDURE — 94010 BREATHING CAPACITY TEST: CPT

## 2024-12-10 PROCEDURE — 94727 GAS DIL/WSHOT DETER LNG VOL: CPT

## 2024-12-10 PROCEDURE — 99497 ADVNCD CARE PLAN 30 MIN: CPT | Mod: 25

## 2024-12-10 RX ORDER — METHYLPREDNISOLONE SOD SUCC 125 MG
125 VIAL (EA) INJECTION ONCE
Refills: 0 | Status: COMPLETED | OUTPATIENT
Start: 2024-12-10 | End: 2024-12-10

## 2024-12-10 RX ORDER — MAGNESIUM, ALUMINUM HYDROXIDE 200-225/5
30 SUSPENSION, ORAL (FINAL DOSE FORM) ORAL EVERY 4 HOURS
Refills: 0 | Status: DISCONTINUED | OUTPATIENT
Start: 2024-12-10 | End: 2024-12-17

## 2024-12-10 RX ORDER — ONDANSETRON HYDROCHLORIDE 4 MG/1
4 TABLET, FILM COATED ORAL EVERY 8 HOURS
Refills: 0 | Status: DISCONTINUED | OUTPATIENT
Start: 2024-12-10 | End: 2024-12-17

## 2024-12-10 RX ORDER — IPRATROPIUM BROMIDE AND ALBUTEROL SULFATE 2.5; .5 MG/3ML; MG/3ML
3 SOLUTION RESPIRATORY (INHALATION)
Refills: 0 | Status: COMPLETED | OUTPATIENT
Start: 2024-12-10 | End: 2024-12-10

## 2024-12-10 RX ORDER — ACETAMINOPHEN 500MG 500 MG/1
650 TABLET, COATED ORAL EVERY 6 HOURS
Refills: 0 | Status: DISCONTINUED | OUTPATIENT
Start: 2024-12-10 | End: 2024-12-17

## 2024-12-10 RX ORDER — ACETAMINOPHEN, DIPHENHYDRAMINE HCL, PHENYLEPHRINE HCL 325; 25; 5 MG/1; MG/1; MG/1
3 TABLET ORAL AT BEDTIME
Refills: 0 | Status: DISCONTINUED | OUTPATIENT
Start: 2024-12-10 | End: 2024-12-17

## 2024-12-10 RX ADMIN — IPRATROPIUM BROMIDE AND ALBUTEROL SULFATE 3 MILLILITER(S): 2.5; .5 SOLUTION RESPIRATORY (INHALATION) at 16:35

## 2024-12-10 RX ADMIN — IPRATROPIUM BROMIDE AND ALBUTEROL SULFATE 3 MILLILITER(S): 2.5; .5 SOLUTION RESPIRATORY (INHALATION) at 16:20

## 2024-12-10 RX ADMIN — IPRATROPIUM BROMIDE AND ALBUTEROL SULFATE 3 MILLILITER(S): 2.5; .5 SOLUTION RESPIRATORY (INHALATION) at 15:55

## 2024-12-10 RX ADMIN — Medication 125 MILLIGRAM(S): at 15:55

## 2024-12-10 RX ADMIN — Medication 150 GRAM(S): at 15:54

## 2024-12-10 NOTE — ED PROVIDER NOTE - OBJECTIVE STATEMENT
74 year old male w/PMHx COPD, emphysema presents to the ED with SOB. Patient states he has been feeling SOB since october, went to see Dr. Ramírez this AM and was told he his "oxygen level was low", sent to the ED for further eval. Patient denies any chest pain, headache, vision changes, neck pain, numbness, tingling or weakness. C/o cough x 1month. SOB unrelieved with inhaler. Does not use home O2, never intubated or hospitalized in the past for COPD. Not  a smoker.

## 2024-12-10 NOTE — H&P ADULT - CONVERSATION DETAILS
Advance care directive discussed with patient. He named his wife as the health care proxy. Patient said he has discussed this topic with his family in the past. He will like a trial of CPR and intubation if needed. He will, however not want to be kept for a prolong period on ventilation. Patient is FULL CODE.

## 2024-12-10 NOTE — ED ADULT NURSE NOTE - CCCP TRG CHIEF CMPLNT
[facility] ENDOCRINOLOGY    Diabetes Note 1/10/2023    Corinna Farias, 1959, [id number]          Reason for visit      1. Morbid obesity (H)    2. Type 1 diabetes mellitus with other specified complication (H)        HPI     Corinna Farias is a very pleasant 63 year old old female who presents for follow up.  SUMMARY:    Corinna is here today in f/u for DM 1 and obesity. Her current A1c is 7.3 and very similar to her previous one of 7.2. She works hard to keep her BG well managed. She is using the [employer]ar CGM and her last two weeks show that she was within range 50% of the time. She had no hypoglycemia demonstrated. She remains on 65 units in the morning and 45 units in the evening. She takes Novolog with meals, according to what she is eating. She remains on Lipitor and Lisinopril protectively. Current Lipid profile is very good. Last BMP showed Renal function within normal range.     Pt has been considering meeting with Bariatrics. She does have a hx of GB and is concerned regarding another procedure. Assured that she can go and get information and then consider whatever they suggest.       Blood glucose data:      Past Medical History     Patient Active Problem List   Diagnosis     Bariatric surgery status (GASTRIC BYPASS)     Vitamin D deficiency     Essential hypertension, benign     GERD (gastroesophageal reflux disease)     Hypothyroidism     Microalbuminuric diabetic nephropathy (H)     Morbid obesity (H)     GIOVANI (obstructive sleep apnea)     Chronic kidney disease, stage 3     Cervical cancer screening     Encounter for long-term (current) use of insulin (H)     Female climacteric state     Insomnia     Insulin pump status     Pseudophakia     Type 1 diabetes mellitus (H)        Family History       family history includes Diabetes in her father and mother.    Social History      reports that she has never smoked. She has never used smokeless tobacco. She reports that she does not drink alcohol  and does not use drugs.      Review of Systems     Patient has no polyuria or polydipsia, no chest pain, dyspnea or TIA's, no numbness, tingling or pain in extremities  Remainder negative except as noted in HPI.    Answers for HPI/ROS submitted by the patient on 1/9/2023  General Symptoms: No  Skin Symptoms: No  HENT Symptoms: No  EYE SYMPTOMS: No  HEART SYMPTOMS: No  LUNG SYMPTOMS: No  INTESTINAL SYMPTOMS: No  URINARY SYMPTOMS: No  GYNECOLOGIC SYMPTOMS: No  BREAST SYMPTOMS: No  SKELETAL SYMPTOMS: No  BLOOD SYMPTOMS: No  NERVOUS SYSTEM SYMPTOMS: No  MENTAL HEALTH SYMPTOMS: No        Vital Signs     /76 (BP Location: Right arm, Patient Position: Sitting, Cuff Size: Adult Large)   Pulse 96   Wt (!) 173.3 kg (382 lb)   LMP  (LMP Unknown)   BMI 63.57 kg/m    Wt Readings from Last 3 Encounters:   01/10/23 (!) 173.3 kg (382 lb)   07/12/22 (!) 169.8 kg (374 lb 4.8 oz)   05/06/22 (!) 171.9 kg (379 lb)       Physical Exam     Constitutional:  Well developed, Well nourished, obese  HENT:  Normocephalic,   Neck: Thyroid normal, No lymph nodes, Supple  Eyes:  PERRL, Conjunctiva pink  Respiratory:  Normal breath sounds, No respiratory distress  Cardiovascular:  Normal heart rate, Normal rhythm, No murmurs  GI:  Bowel sounds normal, Soft, No tenderness  Musculoskeletal:  No gross deformity or lesions, normal dorsalis pedis pulses  Skin: No acanthosis nigricans,   Neurologic:  Alert & oriented x 3, nonfocal  Psychiatric:  Affect, Mood, Insight appropriate  Diabetic foot exam: no ulcers, charcot's or high risk calluses,         Assessment     1. Morbid obesity (H)    2. Type 1 diabetes mellitus with other specified complication (H)        Plan     Pt's DM control is stable. No changes warranted today. She will f/u with me in 6 months.    Pt agreed to referral to Bariatrics for non-surgical intervention.       Rossy Baig NP  [facility]  1/10/2023  8:20 AM        Lab Results     Microalbumin Urine mg/dL   Date  Value Ref Range Status   10/15/2020 6.40 (H) 0.00 - 1.99 mg/dL Final       Cholesterol   Date Value Ref Range Status   01/02/2023 146 <200 mg/dL Final     Direct Measure HDL   Date Value Ref Range Status   01/02/2023 50 >=50 mg/dL Final     Triglycerides   Date Value Ref Range Status   01/02/2023 108 <150 mg/dL Final             Current Medications     Outpatient Medications Prior to Visit   Medication Sig Dispense Refill     ASPIRIN PO Take 81 mg by mouth daily       atorvastatin (LIPITOR) 20 MG tablet Take 1 tablet (20 mg) by mouth daily 90 tablet 0     Continuous Blood Gluc Sensor ([employer]YLE [place] 14 DAY SENSOR) MISC CHANGE SENSOR EVERY 14 DAYS AS DIRECTED 2 each 3     insulin glargine (BASAGLAR KWIKPEN) 100 UNIT/ML pen INJECT 65 UNITS SUBCUTANEOUS EVERY MORNING AND 45 UNITS EVERY EVENING 100 mL 1     insulin lispro (HUMALOG KWIKPEN) 100 UNIT/ML (1 unit dial) KWIKPEN Use three times daily with meals, up to 100 units daily 75 mL 3     levothyroxine (SYNTHROID/LEVOTHROID) 75 MCG tablet TAKE ONE TABLET AT LEAST ONE HOUR BEFORE OR TWO HOURS AFTER A MEAL 90 tablet 2     lisinopril (ZESTRIL) 20 MG tablet Take 1 tablet (20 mg) by mouth daily 90 tablet 2     Multiple Vitamin (DAILY-MICHAEL MULTIVITAMIN) TABS TAKE 1 TABLET BY MOUTH DAILY 90 tablet 2     pantoprazole (PROTONIX) 40 MG EC tablet TAKE 1 TABLET(40 MG) BY MOUTH DAILY 90 tablet 1     traZODone (DESYREL) 100 MG tablet TAKE 1 TABLET BY MOUTH AT BEDTIME AS NEEDED FOR SLEEP 90 tablet 3     VITAMIN D3 25 MCG (1000 UT) tablet TAKE 2 TABLETS BY MOUTH EVERY  tablet 3     insulin glargine (LANTUS PEN) 100 UNIT/ML pen INJECT 65 UNITS SUBCUTANEOUS EVERY MORNING AND 45 UNITS EVERY EVENING 30 mL 0     NOVOLOG FLEXPEN 100 UNIT/ML soln ADMINISTER UP  UNITS UNDER THE SKIN DAILY PER SLIDING SCALE AS DIRECTED 90 mL 1     No facility-administered medications prior to visit.                            shortness of breath

## 2024-12-10 NOTE — ED ADULT TRIAGE NOTE - CHIEF COMPLAINT QUOTE
PT sent to ED by Pulm for hypoxia. hx COPD. Worsening SOB since october. C/O chest tightness EKG obtained

## 2024-12-10 NOTE — H&P ADULT - HISTORY OF PRESENT ILLNESS
75 y/o male with PMH of COPD, HTN, HLD, DM-2, obesity, depression, anxiety came to the ED complaining of shortness of breath.  Patient admitted before midnight.       75 y/o male with PMH of COPD, HTN, HLD, DM-2, obesity, depression, anxiety was sent to the ED from pulmonary office for hypoxia. Patient said he has been having shortness of breath and chest pressure for about 2 months. He was admitted in October for similar thing. CTA chest done and stress test done: negative; his medications were adjusted. Patient continue to have shortness of breath with exertion, noted periodic cough mostly dry. He has no fever, chills, nausea, vomiting, palpitation, abdominal pain, change in bowel/urinary, calf pain, LE edema.

## 2024-12-10 NOTE — ED ADULT TRIAGE NOTE - AVIAN FLU SYMPTOMS
Feeling well.  No complaints.   No contra/lof/vb  GCT today.   TDAP next visit.   COVID hesitant for vaccine.   Discussed pre registration.  Likes to bike for exercise.   RTC 4 w  ANGELINA Simon, TORIBIOM    
No

## 2024-12-10 NOTE — ED ADULT NURSE REASSESSMENT NOTE - NS ED NURSE REASSESS COMMENT FT1
Assumed care of pt from GISELE Ryan RN at 1930. Pt is resting comfortably in stretcher. NAD. Pt is A&Ox4. Respirations are even and unlabored. Pt NSR on cardiac monitor and stating 96% on 2L. Pt awaiting CTA results. Plan on going.

## 2024-12-10 NOTE — ED ADULT NURSE NOTE - CAS TRG GEN SKIN CONDITION
Line Placement    Procedure Information: Peripheral IV    Reason for insertion: intravenous access  Current IV access: peripheral IV      Staffing  CRNA: SONYA DILL  Other anesthesia staff: KRANTHI AMADOR    Preanesthetic Checklist  Completed: patient identified, surgical consent, IV checked and monitors and equipment checked    Insertion Related Data  Aseptic technique utilized  Insertion date/time: 7/25/2018 10:25 AM   Skin prep: alcohol  Technique: direct puncture  Dressing: transparent dressing     Peripheral IV Insertion  Placement site: left forearm  Catheter Size: 20 G  Attempts: 2    Patient tolerated procedure: well       Warm/Dry

## 2024-12-10 NOTE — H&P ADULT - ASSESSMENT
Acute respiratory failure with hypoxia   Admit to medical floor with    CTA chest: no PE, consistent with fibrotic changes   Steroid and neb given in the ED, will continue   Pulmonology consulted     HTN/HLD   Aspirin 81mg   Nifedipine XL 30mg   Atorvastatin 40mg   Ranolazine ER 500mg bid     Depression/anxiety   Sertraline 50mg     DM-2   Hold Metformin 500mg bid   Insulin sliding scale     CKD-3   Stable   Monitor renal function     Supportive   DVT prophylaxis: Heparin sc   Diet:      Plan of care discussed with patient and ER nurse     Dispo: when stable, home.    75 y/o male with PMH of COPD, HTN, HLD, DM-2, obesity, depression, anxiety was sent to the ED from pulmonary office for hypoxia. Patient said he has been having shortness of breath and chest pressure for about 2 months. He was admitted in October for similar thing. CTA chest done and stress test done: negative; his medications were adjusted. Patient continue to have shortness of breath with exertion, noted periodic cough mostly dry.      Acute respiratory failure with hypoxia   Admit to medical floor with    CTA chest: no PE, consistent with fibrotic changes   Steroid and neb given in the ED  Will continue Prednisone 40mg x 3 days, if needed can extend   Pulmonology consulted     Leukocytosis   WBC: 13.72, no left shift   Patient recently treated with steroid   No clear source of infection, patient non-toxic appearing   Will watch without antibiotic     HTN/HLD   Aspirin 81mg   Nifedipine XL 30mg   Atorvastatin 40mg   Ranolazine ER 500mg bid     Depression/anxiety   Sertraline 50mg     DM-2   Hold Metformin 500mg bid   Insulin sliding scale     CKD-3   Stable   Monitor renal function     Supportive   DVT prophylaxis: Heparin sc   Diet: cardiac/CHO     Plan of care discussed with patient and ER nurse     Dispo: when stable, home.    75 y/o male with PMH of COPD, HTN, HLD, DM-2, obesity, depression, anxiety was sent to the ED from pulmonary office for hypoxia. Patient said he has been having shortness of breath and chest pressure for about 2 months. He was admitted in October for similar thing. CTA chest done and stress test done: negative; his medications were adjusted. Patient continue to have shortness of breath with exertion, noted periodic cough mostly dry.      Acute respiratory failure with hypoxia   Admit to medical floor with    CTA chest: no PE, consistent with fibrotic changes   Steroid and neb given in the ED  Will continue Prednisone 40mg x 3 days, if needed can extend   Pulmonology consulted     Leukocytosis   WBC: 13.72, no left shift   Patient recently treated with steroid   No clear source of infection, patient non-toxic appearing   Will watch without antibiotic     Hx of COPD   Continue neb treatment   Oxygen therapy as needed   Follows with pulmonology (Dr. Wilkinson)     HTN/HLD   Aspirin 81mg   Nifedipine XL 30mg   Atorvastatin 40mg   Ranolazine ER 500mg bid     Depression/anxiety   Sertraline 50mg     DM-2   Hold Metformin 500mg bid   Insulin sliding scale     CKD-3   Stable   Monitor renal function     Supportive   DVT prophylaxis: Heparin sc   Diet: cardiac/CHO     Plan of care discussed with patient and ER nurse     Dispo: when stable, home.

## 2024-12-10 NOTE — ED PROVIDER NOTE - ATTENDING CONTRIBUTION TO CARE
On arrival SpO2 80% on RA. Placed on 3L NC with improvement to 96%. EKG without ischemic changes. Will obtain blood work, chest xray. CTA to assess for PE. Given solumedrol, duoneb, mag.    I, Kevin Hernandez, performed the initial face to face bedside interview with this patient regarding history of present illness, review of symptoms and relevant past medical, social and family history.  I completed an independent physical examination.  I was the initial provider who evaluated this patient. I have signed out the follow up of any pending tests (i.e. labs, radiological studies) to the resident.  I have communicated the patient’s plan of care and disposition with the resident

## 2024-12-10 NOTE — H&P ADULT - MUSCULOSKELETAL
ROM intact/no joint swelling/no joint erythema/no joint warmth/no calf tenderness ROM intact/no joint swelling/no joint erythema/no joint warmth/no calf tenderness/strength 5/5 bilateral upper extremities/strength 5/5 bilateral lower extremities

## 2024-12-10 NOTE — H&P ADULT - NSHPPHYSICALEXAM_GEN_ALL_CORE
Vital Signs Last 24 Hrs  T(C): 36.7 (10 Dec 2024 15:24), Max: 36.7 (10 Dec 2024 15:24)  T(F): 98 (10 Dec 2024 15:24), Max: 98 (10 Dec 2024 15:24)  HR: 70 (10 Dec 2024 18:55) (65 - 72)  BP: 125/82 (10 Dec 2024 18:55) (125/82 - 157/84)  BP(mean): 102 (10 Dec 2024 15:37) (102 - 102)  RR: 18 (10 Dec 2024 18:55) (17 - 18)  SpO2: 92% (10 Dec 2024 20:48) (83% - 100%)    Parameters below as of 10 Dec 2024 20:48  Patient On (Oxygen Delivery Method): nasal cannula  O2 Flow (L/min): 2

## 2024-12-10 NOTE — ED ADULT NURSE NOTE - OBJECTIVE STATEMENT
Pt presents to the ED A&Ox4 sent in by pulmonology for hypoxia. Pt 79% on room air, pt placed on 5 L/min NC. Pt following up routine appointment at pulmonologist. Pt denies fever/chills, N/V. Pt reports a cough x a month or so. Pt reports worsening dyspnea upon exertion for a while now. Pt reports PMH of COPD not on O2, DM, and  diverticulosis.

## 2024-12-10 NOTE — ED PROVIDER NOTE - PHYSICAL EXAMINATION
Gen: NAD, AOx3  Head: NCAT  HEENT: EOMI, oral mucosa moist, normal conjunctiva, neck supple  Lung: decreased breath sounds b/l  CV: rrr,  Normal perfusion  Abd: soft, NTND  MSK: No edema, no visible deformities  Neuro: No focal neurologic deficits  Skin: No rash   Psych: normal affect

## 2024-12-10 NOTE — H&P ADULT - NSHPSOCIALHISTORY_GEN_ALL_CORE
Former cigarette use, smoked for about 25 years stopped about 35 years ago. No alcohol or illicit drug use. , lives with family.

## 2024-12-11 LAB
GLUCOSE BLDC GLUCOMTR-MCNC: 163 MG/DL — HIGH (ref 70–99)
GLUCOSE BLDC GLUCOMTR-MCNC: 164 MG/DL — HIGH (ref 70–99)
GLUCOSE BLDC GLUCOMTR-MCNC: 175 MG/DL — HIGH (ref 70–99)
GLUCOSE BLDC GLUCOMTR-MCNC: 184 MG/DL — HIGH (ref 70–99)
RAPID RVP RESULT: SIGNIFICANT CHANGE UP
SARS-COV-2 RNA SPEC QL NAA+PROBE: SIGNIFICANT CHANGE UP

## 2024-12-11 PROCEDURE — 99232 SBSQ HOSP IP/OBS MODERATE 35: CPT

## 2024-12-11 PROCEDURE — 99222 1ST HOSP IP/OBS MODERATE 55: CPT

## 2024-12-11 PROCEDURE — 93970 EXTREMITY STUDY: CPT | Mod: 26

## 2024-12-11 RX ORDER — NIFEDIPINE 10 MG
30 CAPSULE ORAL DAILY
Refills: 0 | Status: DISCONTINUED | OUTPATIENT
Start: 2024-12-11 | End: 2024-12-17

## 2024-12-11 RX ORDER — RANOLAZINE 1000 MG/1
500 TABLET, FILM COATED, EXTENDED RELEASE ORAL
Refills: 0 | Status: DISCONTINUED | OUTPATIENT
Start: 2024-12-11 | End: 2024-12-17

## 2024-12-11 RX ORDER — HEPARIN SODIUM,PORCINE 1000/ML
5000 VIAL (ML) INJECTION EVERY 8 HOURS
Refills: 0 | Status: DISCONTINUED | OUTPATIENT
Start: 2024-12-11 | End: 2024-12-17

## 2024-12-11 RX ORDER — 0.9 % SODIUM CHLORIDE 0.9 %
1000 INTRAVENOUS SOLUTION INTRAVENOUS
Refills: 0 | Status: DISCONTINUED | OUTPATIENT
Start: 2024-12-11 | End: 2024-12-17

## 2024-12-11 RX ORDER — TIOTROPIUM BROMIDE AND OLODATEROL 3.124; 2.736 UG/1; UG/1
2 SPRAY, METERED RESPIRATORY (INHALATION) DAILY
Refills: 0 | Status: DISCONTINUED | OUTPATIENT
Start: 2024-12-11 | End: 2024-12-17

## 2024-12-11 RX ORDER — GLUCAGON INJECTION, SOLUTION 0.5 MG/.1ML
1 INJECTION, SOLUTION SUBCUTANEOUS ONCE
Refills: 0 | Status: DISCONTINUED | OUTPATIENT
Start: 2024-12-11 | End: 2024-12-17

## 2024-12-11 RX ORDER — PREDNISONE 20 MG/1
40 TABLET ORAL DAILY
Refills: 0 | Status: DISCONTINUED | OUTPATIENT
Start: 2024-12-11 | End: 2024-12-12

## 2024-12-11 RX ORDER — IPRATROPIUM BROMIDE AND ALBUTEROL SULFATE 2.5; .5 MG/3ML; MG/3ML
3 SOLUTION RESPIRATORY (INHALATION) EVERY 6 HOURS
Refills: 0 | Status: DISCONTINUED | OUTPATIENT
Start: 2024-12-11 | End: 2024-12-17

## 2024-12-11 RX ORDER — INFLUENZA VIRUS VACCINE 15; 15; 15; 15 UG/.5ML; UG/.5ML; UG/.5ML; UG/.5ML
0.5 SUSPENSION INTRAMUSCULAR ONCE
Refills: 0 | Status: DISCONTINUED | OUTPATIENT
Start: 2024-12-11 | End: 2024-12-17

## 2024-12-11 RX ORDER — SERTRALINE HYDROCHLORIDE 100 MG/1
50 TABLET, FILM COATED ORAL DAILY
Refills: 0 | Status: DISCONTINUED | OUTPATIENT
Start: 2024-12-11 | End: 2024-12-17

## 2024-12-11 RX ORDER — RANOLAZINE 1000 MG/1
1 TABLET, FILM COATED, EXTENDED RELEASE ORAL
Refills: 0 | DISCHARGE

## 2024-12-11 RX ADMIN — PREDNISONE 40 MILLIGRAM(S): 20 TABLET ORAL at 05:37

## 2024-12-11 RX ADMIN — SERTRALINE HYDROCHLORIDE 50 MILLIGRAM(S): 100 TABLET, FILM COATED ORAL at 12:03

## 2024-12-11 RX ADMIN — Medication 5000 UNIT(S): at 05:37

## 2024-12-11 RX ADMIN — ACETAMINOPHEN, DIPHENHYDRAMINE HCL, PHENYLEPHRINE HCL 3 MILLIGRAM(S): 325; 25; 5 TABLET ORAL at 21:16

## 2024-12-11 RX ADMIN — IPRATROPIUM BROMIDE AND ALBUTEROL SULFATE 3 MILLILITER(S): 2.5; .5 SOLUTION RESPIRATORY (INHALATION) at 07:42

## 2024-12-11 RX ADMIN — Medication 5000 UNIT(S): at 14:17

## 2024-12-11 RX ADMIN — RANOLAZINE 500 MILLIGRAM(S): 1000 TABLET, FILM COATED, EXTENDED RELEASE ORAL at 05:37

## 2024-12-11 RX ADMIN — Medication 40 MILLIGRAM(S): at 21:16

## 2024-12-11 RX ADMIN — Medication 81 MILLIGRAM(S): at 12:03

## 2024-12-11 RX ADMIN — RANOLAZINE 500 MILLIGRAM(S): 1000 TABLET, FILM COATED, EXTENDED RELEASE ORAL at 17:35

## 2024-12-11 RX ADMIN — Medication 30 MILLIGRAM(S): at 05:37

## 2024-12-11 RX ADMIN — Medication 1: at 12:09

## 2024-12-11 RX ADMIN — Medication 1: at 08:53

## 2024-12-11 RX ADMIN — IPRATROPIUM BROMIDE AND ALBUTEROL SULFATE 3 MILLILITER(S): 2.5; .5 SOLUTION RESPIRATORY (INHALATION) at 14:00

## 2024-12-11 RX ADMIN — IPRATROPIUM BROMIDE AND ALBUTEROL SULFATE 3 MILLILITER(S): 2.5; .5 SOLUTION RESPIRATORY (INHALATION) at 22:00

## 2024-12-11 RX ADMIN — Medication 1: at 17:39

## 2024-12-11 RX ADMIN — Medication 5000 UNIT(S): at 21:16

## 2024-12-11 NOTE — PATIENT PROFILE ADULT - FALL HARM RISK - HARM RISK INTERVENTIONS

## 2024-12-11 NOTE — CONSULT NOTE ADULT - SUBJECTIVE AND OBJECTIVE BOX
Patient is a 74y old  Male who presents with a chief complaint of dyspnea     HPI:  74M with hx of NJ not on CPAP, diverticulosis, hx of spontaneous L pneumothorax, HTN, HLD, DM, admission 10/2024 for atypical chest pain with CTA neg for PE but demonstrated fibrotic changes sent from pulmonary office due to progressive dyspnea over the last few weeks. Pt follows with Dr. Wilkinson outpatient and was maintained on Stiolto and had been placed on pirfenidone but patient discontinued due to lack of perceived benefit. Pt underwent connective tissue workup which was negative. PFTs demonstrated severe restriction, mild to mod obstruction and severely reduced DLCO.   Pt denies any recent fevers/chills. Denies sick contacts. Endorses cough without sputum. Notes some wheezing. Denies nausea/vomiting/abdominal pain. Denies LE edema. Found to be hypoxic in the ED. CT chest angio negative for PE within limits and demonstrated subpleural reticulations, bronchiectasis and cystic changes. Pt started on empiric steroids.     PAST MEDICAL & SURGICAL HISTORY:  NJ (obstructive sleep apnea)  mouthpiece      Diabetes      Obesity      NJ and COPD overlap syndrome      Hypercholesteremia      History of COPD      Diverticulitis      H/O hernia repair      History of appendectomy    Medications:    NIFEdipine XL 30 milliGRAM(s) Oral daily  ranolazine 500 milliGRAM(s) Oral two times a day    albuterol/ipratropium for Nebulization 3 milliLiter(s) Nebulizer every 6 hours  tiotropium 2.5 MICROgram(s)/olodaterol 2.5 MICROgram(s) (STIOLTO) Inhaler 2 Puff(s) Inhalation daily    acetaminophen     Tablet .. 650 milliGRAM(s) Oral every 6 hours PRN  melatonin 3 milliGRAM(s) Oral at bedtime PRN  ondansetron Injectable 4 milliGRAM(s) IV Push every 8 hours PRN  sertraline 50 milliGRAM(s) Oral daily      aspirin  chewable 81 milliGRAM(s) Oral daily  heparin   Injectable 5000 Unit(s) SubCutaneous every 8 hours    aluminum hydroxide/magnesium hydroxide/simethicone Suspension 30 milliLiter(s) Oral every 4 hours PRN      atorvastatin 40 milliGRAM(s) Oral at bedtime  dextrose 50% Injectable 25 Gram(s) IV Push once  dextrose 50% Injectable 12.5 Gram(s) IV Push once  dextrose 50% Injectable 25 Gram(s) IV Push once  dextrose Oral Gel 15 Gram(s) Oral once PRN  glucagon  Injectable 1 milliGRAM(s) IntraMuscular once  insulin lispro (ADMELOG) corrective regimen sliding scale   SubCutaneous three times a day before meals  insulin lispro (ADMELOG) corrective regimen sliding scale   SubCutaneous at bedtime  predniSONE   Tablet 40 milliGRAM(s) Oral daily    dextrose 5%. 1000 milliLiter(s) IV Continuous <Continuous>  dextrose 5%. 1000 milliLiter(s) IV Continuous <Continuous>    influenza  Vaccine (HIGH DOSE) 0.5 milliLiter(s) IntraMuscular once    Allergies: NKDA     Social:  former smoker 1/2 ppd x 25 yrs quit 35 yrs ago, prior EtOH use, denies illicit drug use     FH: Father-heart disease, Mother-pancreatic ca     ICU Vital Signs Last 24 Hrs  T(C): 36.8 (11 Dec 2024 11:36), Max: 36.8 (10 Dec 2024 23:28)  T(F): 98.2 (11 Dec 2024 11:36), Max: 98.3 (10 Dec 2024 23:28)  HR: 86 (11 Dec 2024 14:03) (66 - 86)  BP: 148/77 (11 Dec 2024 11:36) (125/82 - 154/66)  BP(mean): 97 (11 Dec 2024 05:09) (97 - 97)  ABP: --  ABP(mean): --  RR: 18 (11 Dec 2024 11:36) (18 - 24)  SpO2: 92% (11 Dec 2024 14:03) (83% - 99%)    O2 Parameters below as of 11 Dec 2024 14:03  Patient On (Oxygen Delivery Method): nasal cannula                I&O's Detail        LABS:                        13.1   13.72 )-----------( 289      ( 10 Dec 2024 15:44 )             40.9     12-10    138  |  100  |  26.7[H]  ----------------------------<  90  4.3   |  25.0  |  1.75[H]    Ca    10.0      10 Dec 2024 15:44    TPro  7.4  /  Alb  4.2  /  TBili  0.6  /  DBili  x   /  AST  16  /  ALT  15  /  AlkPhos  122[H]  12-10          CAPILLARY BLOOD GLUCOSE      POCT Blood Glucose.: 175 mg/dL (11 Dec 2024 11:57)    PT/INR - ( 10 Dec 2024 15:44 )   PT: 11.2 sec;   INR: 0.99 ratio         PTT - ( 10 Dec 2024 15:44 )  PTT:27.3 sec  Urinalysis Basic - ( 10 Dec 2024 15:44 )    Color: x / Appearance: x / SG: x / pH: x  Gluc: 90 mg/dL / Ketone: x  / Bili: x / Urobili: x   Blood: x / Protein: x / Nitrite: x   Leuk Esterase: x / RBC: x / WBC x   Sq Epi: x / Non Sq Epi: x / Bacteria: x      CULTURES:  Rapid RVP Result: NotDetec (12-11 @ 08:25)      Physical Examination:    General: awake, alert, in NAD     HEENT: NC/AT, anicteric, MMM    PULM: diminished breath sounds bilaterally, no accessory muscle use     NECK: Supple, trachea midline    CVS: S1S2, RRR    ABD: Soft, nondistended, nontender, normoactive bowel sounds    EXT: trace b/l LE edema, nontender    SKIN: Warm and well perfused, no rashes noted    NEURO: Alert, oriented, interactive, nonfocal    ROS: negative except as per HPI     RADIOLOGY:  < from: CT Angio Chest PE Protocol w/ IV Cont (12.10.24 @ 19:27) >  Lines and tubes: None    Pulmonary arteries: There is suboptimal timing of the contrast bolus.   There is opacification through the level of the lobar pulmonary arteries.    There is no respiratory motion artifact. No filling defects are present   to the level of the lobar pulmonary arteries. Evaluation of more distal   segmental and subsegmental pulmonary arteries is limited by the   suboptimal contrast bolus.  The main pulmonary artery is normal in size.    The heart size is within normal limits.   The right heart is not enlarged.    Mediastinum: The thyroid and thoracic inletare normal. There is 1.1 cm   right hilar lymph node. There is no evidence of enlarged mediastinal or   axillary lymph nodes.  No pericardial effusion is present. The esophagus   is normal.    Lung: Central tracheobronchial tree is patent. There is redemonstration   of architectural distortion of the lung parenchyma, subpleural   reticulations and fine fibrotic changes in all 5 lobes,   bronchiectasis/bronchiolectasis, and peripheral cystic changes in the   lower lobes. There are subpleural nodular and reticular opacification in   the lung bases, in keeping with dendriform ossification of pulmonary   parenchyma    Pleura: No effusions or pneumothorax.    Abdomen: There are bilateral renal cysts measuring up to 4.5 cm right   kidney. Colonic diverticula are noted. Limited evaluation of the liver,   spleen, pancreas, and adrenal glands is unremarkable.    Bone and soft tissue: The osseous structures and visualized soft tissues   demonstrate no acute abnormality. There are multilevel degenerative   changes of thoracic spine.    < end of copied text >

## 2024-12-11 NOTE — PROGRESS NOTE ADULT - ASSESSMENT
75 y/o male with PMH of COPD, HTN, HLD, DM-2, obesity, depression, anxiety was sent to the ED from pulmonary office for hypoxia. Patient said he has been having shortness of breath and chest pressure for about 2 months. He was admitted in October for similar thing. CTA chest done and stress test done: negative; his medications were adjusted. Patient continue to have shortness of breath with exertion, noted periodic cough mostly dry.    Acute respiratory failure with hypoxia   Admit to medical floor with    CTA chest: no PE, consistent with fibrotic changes   Steroid and neb given in the ED  Will continue Prednisone 40mg x 3 days, f/u pulm for steroid recs  Pulmonology consulted, malik recs    Leukocytosis   WBC: 13.72, no left shift   Patient recently treated with steroid   No clear source of infection, patient non-toxic appearing   Will watch without antibiotic     Hx of COPD   Continue neb treatment   Oxygen therapy as needed   Follows with pulmonology (Dr. Wilkinson), pending recs    HTN/HLD   Aspirin 81mg   Nifedipine XL 30mg   Atorvastatin 40mg   Ranolazine ER 500mg bid     Depression/anxiety   Sertraline 50mg     DM-2   Hold Metformin 500mg bid   Insulin sliding scale     CKD-3   Stable   Monitor renal function     DVT prophylaxis: Heparin sc   Diet: cardiac/CHO   Dispo: pending pulm recs and improvement in resp status

## 2024-12-11 NOTE — PATIENT PROFILE ADULT - NSPRONUTRITIONRISK_GEN_A_NUR
Problem: Falls - Risk of:  Goal: Will remain free from falls  Will remain free from falls  Outcome: Ongoing No indicators present

## 2024-12-11 NOTE — PROGRESS NOTE ADULT - SUBJECTIVE AND OBJECTIVE BOX
Aakash Farias M.D.    Patient is a 74y old  Male who presents with a chief complaint of     SUBJECTIVE / OVERNIGHT EVENTS: no event overnight. still SOB, but improved from before.      Patient denies chest pain, abd pain, N/V, fever, chills, dysuria or any other complaints. All remainder ROS negative.     MEDICATIONS  (STANDING):  albuterol/ipratropium for Nebulization 3 milliLiter(s) Nebulizer every 6 hours  aspirin  chewable 81 milliGRAM(s) Oral daily  atorvastatin 40 milliGRAM(s) Oral at bedtime  dextrose 5%. 1000 milliLiter(s) (100 mL/Hr) IV Continuous <Continuous>  dextrose 5%. 1000 milliLiter(s) (50 mL/Hr) IV Continuous <Continuous>  dextrose 50% Injectable 25 Gram(s) IV Push once  dextrose 50% Injectable 12.5 Gram(s) IV Push once  dextrose 50% Injectable 25 Gram(s) IV Push once  glucagon  Injectable 1 milliGRAM(s) IntraMuscular once  heparin   Injectable 5000 Unit(s) SubCutaneous every 8 hours  influenza  Vaccine (HIGH DOSE) 0.5 milliLiter(s) IntraMuscular once  insulin lispro (ADMELOG) corrective regimen sliding scale   SubCutaneous three times a day before meals  insulin lispro (ADMELOG) corrective regimen sliding scale   SubCutaneous at bedtime  NIFEdipine XL 30 milliGRAM(s) Oral daily  predniSONE   Tablet 40 milliGRAM(s) Oral daily  ranolazine 500 milliGRAM(s) Oral two times a day  sertraline 50 milliGRAM(s) Oral daily  tiotropium 2.5 MICROgram(s)/olodaterol 2.5 MICROgram(s) (STIOLTO) Inhaler 2 Puff(s) Inhalation daily    MEDICATIONS  (PRN):  acetaminophen     Tablet .. 650 milliGRAM(s) Oral every 6 hours PRN Temp greater or equal to 38C (100.4F), Mild Pain (1 - 3)  aluminum hydroxide/magnesium hydroxide/simethicone Suspension 30 milliLiter(s) Oral every 4 hours PRN Dyspepsia  dextrose Oral Gel 15 Gram(s) Oral once PRN Blood Glucose LESS THAN 70 milliGRAM(s)/deciliter  melatonin 3 milliGRAM(s) Oral at bedtime PRN Insomnia  ondansetron Injectable 4 milliGRAM(s) IV Push every 8 hours PRN Nausea and/or Vomiting      I&O's Summary      PHYSICAL EXAM:  Vital Signs Last 24 Hrs  T(C): 36.3 (11 Dec 2024 07:44), Max: 36.8 (10 Dec 2024 23:28)  T(F): 97.3 (11 Dec 2024 07:44), Max: 98.3 (10 Dec 2024 23:28)  HR: 66 (11 Dec 2024 07:44) (65 - 80)  BP: 132/80 (11 Dec 2024 07:44) (125/82 - 157/84)  BP(mean): 97 (11 Dec 2024 05:09) (97 - 102)  RR: 18 (11 Dec 2024 07:44) (17 - 24)  SpO2: 99% (11 Dec 2024 07:44) (83% - 100%)    Parameters below as of 11 Dec 2024 08:13  Patient On (Oxygen Delivery Method): titrated to 2L    CONSTITUTIONAL: NAD, well-groomed  RESPIRATORY: Normal respiratory effort; rales in b/l bases  CARDIOVASCULAR: Regular rate and rhythm, no LE edema  ABDOMEN: Nontender to palpation, normoactive bowel sounds  PSYCH: A+O x3; affect appropriate    LABS:                        13.1   13.72 )-----------( 289      ( 10 Dec 2024 15:44 )             40.9     12-10    138  |  100  |  26.7[H]  ----------------------------<  90  4.3   |  25.0  |  1.75[H]    Ca    10.0      10 Dec 2024 15:44    TPro  7.4  /  Alb  4.2  /  TBili  0.6  /  DBili  x   /  AST  16  /  ALT  15  /  AlkPhos  122[H]  12-10    PT/INR - ( 10 Dec 2024 15:44 )   PT: 11.2 sec;   INR: 0.99 ratio         PTT - ( 10 Dec 2024 15:44 )  PTT:27.3 sec      Urinalysis Basic - ( 10 Dec 2024 15:44 )    Color: x / Appearance: x / SG: x / pH: x  Gluc: 90 mg/dL / Ketone: x  / Bili: x / Urobili: x   Blood: x / Protein: x / Nitrite: x   Leuk Esterase: x / RBC: x / WBC x   Sq Epi: x / Non Sq Epi: x / Bacteria: x        CAPILLARY BLOOD GLUCOSE      POCT Blood Glucose.: 163 mg/dL (11 Dec 2024 08:01)      RADIOLOGY & ADDITIONAL TESTS:  Results Reviewed:   Imaging Personally Reviewed:  Electrocardiogram Personally Reviewed:

## 2024-12-11 NOTE — CONSULT NOTE ADULT - ASSESSMENT
74M with hx of NJ not on CPAP, diverticulosis, hx of spontaneous L pneumothorax, HTN, HLD, DM, admission 10/2024 for atypical chest pain with CTA neg for PE but demonstrated fibrotic changes sent from pulmonary office due to progressive dyspnea over the last few weeks found to have hypoxic respiratory failure in the setting of ILD vs COPD flare      Acute hypoxic respiratory failure   likely secondary to ILD vs COPD flare   c/w prednisone 40mg with taper q 3days by 10mg   c/w Stiolto   albuterol prn   CT chest angio limited due to timing but no lobar PE   f/u LE duplex   check sputum cx   check procal   if persistent coughing, can start short course of abx for possible bronchitis   cardiology f/u   wean supplemental O2 as tolerated for goal sat >88-92%  OOB/ambulate   DVT ppx   prior autoimmune workup negative

## 2024-12-12 LAB
ANION GAP SERPL CALC-SCNC: 11 MMOL/L — SIGNIFICANT CHANGE UP (ref 5–17)
BUN SERPL-MCNC: 36.3 MG/DL — HIGH (ref 8–20)
CALCIUM SERPL-MCNC: 9.6 MG/DL — SIGNIFICANT CHANGE UP (ref 8.4–10.5)
CHLORIDE SERPL-SCNC: 101 MMOL/L — SIGNIFICANT CHANGE UP (ref 96–108)
CO2 SERPL-SCNC: 25 MMOL/L — SIGNIFICANT CHANGE UP (ref 22–29)
CREAT SERPL-MCNC: 1.93 MG/DL — HIGH (ref 0.5–1.3)
EGFR: 35 ML/MIN/1.73M2 — LOW
GLUCOSE BLDC GLUCOMTR-MCNC: 132 MG/DL — HIGH (ref 70–99)
GLUCOSE BLDC GLUCOMTR-MCNC: 171 MG/DL — HIGH (ref 70–99)
GLUCOSE BLDC GLUCOMTR-MCNC: 183 MG/DL — HIGH (ref 70–99)
GLUCOSE BLDC GLUCOMTR-MCNC: 186 MG/DL — HIGH (ref 70–99)
GLUCOSE BLDC GLUCOMTR-MCNC: 210 MG/DL — HIGH (ref 70–99)
GLUCOSE SERPL-MCNC: 139 MG/DL — HIGH (ref 70–99)
HCT VFR BLD CALC: 42.2 % — SIGNIFICANT CHANGE UP (ref 39–50)
HGB BLD-MCNC: 13.5 G/DL — SIGNIFICANT CHANGE UP (ref 13–17)
MCHC RBC-ENTMCNC: 30 PG — SIGNIFICANT CHANGE UP (ref 27–34)
MCHC RBC-ENTMCNC: 32 G/DL — SIGNIFICANT CHANGE UP (ref 32–36)
MCV RBC AUTO: 93.8 FL — SIGNIFICANT CHANGE UP (ref 80–100)
PLATELET # BLD AUTO: 272 K/UL — SIGNIFICANT CHANGE UP (ref 150–400)
POTASSIUM SERPL-MCNC: 4.8 MMOL/L — SIGNIFICANT CHANGE UP (ref 3.5–5.3)
POTASSIUM SERPL-SCNC: 4.8 MMOL/L — SIGNIFICANT CHANGE UP (ref 3.5–5.3)
PROCALCITONIN SERPL-MCNC: 0.11 NG/ML — HIGH (ref 0.02–0.1)
RBC # BLD: 4.5 M/UL — SIGNIFICANT CHANGE UP (ref 4.2–5.8)
RBC # FLD: 14.9 % — HIGH (ref 10.3–14.5)
SODIUM SERPL-SCNC: 137 MMOL/L — SIGNIFICANT CHANGE UP (ref 135–145)
WBC # BLD: 19.83 K/UL — HIGH (ref 3.8–10.5)
WBC # FLD AUTO: 19.83 K/UL — HIGH (ref 3.8–10.5)

## 2024-12-12 PROCEDURE — 99232 SBSQ HOSP IP/OBS MODERATE 35: CPT

## 2024-12-12 RX ORDER — METHYLPREDNISOLONE SOD SUCC 125 MG
40 VIAL (EA) INJECTION EVERY 12 HOURS
Refills: 0 | Status: DISCONTINUED | OUTPATIENT
Start: 2024-12-12 | End: 2024-12-13

## 2024-12-12 RX ADMIN — Medication 30 MILLIGRAM(S): at 05:17

## 2024-12-12 RX ADMIN — Medication 81 MILLIGRAM(S): at 12:38

## 2024-12-12 RX ADMIN — RANOLAZINE 500 MILLIGRAM(S): 1000 TABLET, FILM COATED, EXTENDED RELEASE ORAL at 05:16

## 2024-12-12 RX ADMIN — Medication 5000 UNIT(S): at 05:16

## 2024-12-12 RX ADMIN — Medication 5000 UNIT(S): at 13:39

## 2024-12-12 RX ADMIN — SERTRALINE HYDROCHLORIDE 50 MILLIGRAM(S): 100 TABLET, FILM COATED ORAL at 12:38

## 2024-12-12 RX ADMIN — RANOLAZINE 500 MILLIGRAM(S): 1000 TABLET, FILM COATED, EXTENDED RELEASE ORAL at 18:03

## 2024-12-12 RX ADMIN — PREDNISONE 40 MILLIGRAM(S): 20 TABLET ORAL at 05:17

## 2024-12-12 RX ADMIN — Medication 40 MILLIGRAM(S): at 21:49

## 2024-12-12 RX ADMIN — IPRATROPIUM BROMIDE AND ALBUTEROL SULFATE 3 MILLILITER(S): 2.5; .5 SOLUTION RESPIRATORY (INHALATION) at 21:29

## 2024-12-12 RX ADMIN — Medication 40 MILLIGRAM(S): at 18:03

## 2024-12-12 RX ADMIN — Medication 5000 UNIT(S): at 21:49

## 2024-12-12 RX ADMIN — IPRATROPIUM BROMIDE AND ALBUTEROL SULFATE 3 MILLILITER(S): 2.5; .5 SOLUTION RESPIRATORY (INHALATION) at 04:42

## 2024-12-12 RX ADMIN — Medication 1: at 18:04

## 2024-12-12 RX ADMIN — Medication 1: at 12:38

## 2024-12-12 RX ADMIN — IPRATROPIUM BROMIDE AND ALBUTEROL SULFATE 3 MILLILITER(S): 2.5; .5 SOLUTION RESPIRATORY (INHALATION) at 15:12

## 2024-12-12 RX ADMIN — IPRATROPIUM BROMIDE AND ALBUTEROL SULFATE 3 MILLILITER(S): 2.5; .5 SOLUTION RESPIRATORY (INHALATION) at 08:56

## 2024-12-12 NOTE — PROGRESS NOTE ADULT - ASSESSMENT
73 y/o male with PMH of COPD, HTN, HLD, DM-2, obesity, depression, anxiety was sent to the ED from pulmonary office for hypoxia. Patient said he has been having shortness of breath and chest pressure for about 2 months. He was admitted in October for similar thing. CTA chest done and stress test done: negative; his medications were adjusted. Patient continue to have shortness of breath with exertion, noted periodic cough mostly dry.    Acute respiratory failure with hypoxia   Admit to medical floor with    CTA chest: no PE, consistent with fibrotic changes   Steroid and neb given in the ED  Pulmonology consulted, malik recs - f/u recs about need for biopsy  given persistent hypoxia, will start IV steroid, taper per pulm   check amb O2    Leukocytosis   WBC: 13.72, no left shift   Patient recently treated with steroid   No clear source of infection, patient non-toxic appearing   Will watch without antibiotic     Hx of COPD   Continue neb treatment   Oxygen therapy as needed   Follows with pulmonology (Dr. Wilkisnon), malik recs    HTN/HLD   Aspirin 81mg   Nifedipine XL 30mg   Atorvastatin 40mg   Ranolazine ER 500mg bid     Depression/anxiety   Sertraline 50mg     DM-2   Hold Metformin 500mg bid   Insulin sliding scale     CKD-3   Stable   Monitor renal function     DVT prophylaxis: Heparin sc   Diet: cardiac/CHO   Dispo: pending pulm recs and improvement in resp status and home O2 eval    75 y/o male with PMH of COPD, HTN, HLD, DM-2, obesity, depression, anxiety was sent to the ED from pulmonary office for hypoxia. Patient said he has been having shortness of breath and chest pressure for about 2 months. He was admitted in October for similar thing. CTA chest done and stress test done: negative; his medications were adjusted. Patient continue to have shortness of breath with exertion, noted periodic cough mostly dry.    Acute respiratory failure with hypoxia   Admit to medical floor with    CTA chest: no PE, consistent with fibrotic changes   Steroid and neb given in the ED  Pulmonology consulted, malik recs - f/u recs about need for biopsy  given persistent hypoxia, will start IV steroid, taper per pulm   check amb O2    DENIS on CKD  SCr mildly uptrending p to 1.98  check bladder scan x1  trend BMP    Leukocytosis   WBC: 13.72, no left shift   Patient recently treated with steroid   No clear source of infection, patient non-toxic appearing   Will watch without antibiotic     Hx of COPD   Continue neb treatment   Oxygen therapy as needed   Follows with pulmonology (Dr. Wilkinson), malik recs    HTN/HLD   Aspirin 81mg   Nifedipine XL 30mg   Atorvastatin 40mg   Ranolazine ER 500mg bid     Depression/anxiety   Sertraline 50mg     DM-2   Hold Metformin 500mg bid   Insulin sliding scale     CKD-3   Stable   Monitor renal function     DVT prophylaxis: Heparin sc   Diet: cardiac/CHO   Dispo: pending pulm recs and improvement in resp status and home O2 eval

## 2024-12-12 NOTE — PROGRESS NOTE ADULT - SUBJECTIVE AND OBJECTIVE BOX
Aakash Farias M.D.    Patient is a 74y old  Male who presents with a chief complaint of hypoxic respiratory failure (11 Dec 2024 15:59)      SUBJECTIVE / OVERNIGHT EVENTS: still with SOB and desaturation to 70s with ambulation.     Patient denies chest pain, abd pain, N/V, fever, chills, dysuria or any other complaints. All remainder ROS negative.     MEDICATIONS  (STANDING):  albuterol/ipratropium for Nebulization 3 milliLiter(s) Nebulizer every 6 hours  aspirin  chewable 81 milliGRAM(s) Oral daily  atorvastatin 40 milliGRAM(s) Oral at bedtime  dextrose 5%. 1000 milliLiter(s) (100 mL/Hr) IV Continuous <Continuous>  dextrose 5%. 1000 milliLiter(s) (50 mL/Hr) IV Continuous <Continuous>  dextrose 50% Injectable 25 Gram(s) IV Push once  dextrose 50% Injectable 12.5 Gram(s) IV Push once  dextrose 50% Injectable 25 Gram(s) IV Push once  glucagon  Injectable 1 milliGRAM(s) IntraMuscular once  heparin   Injectable 5000 Unit(s) SubCutaneous every 8 hours  influenza  Vaccine (HIGH DOSE) 0.5 milliLiter(s) IntraMuscular once  insulin lispro (ADMELOG) corrective regimen sliding scale   SubCutaneous three times a day before meals  insulin lispro (ADMELOG) corrective regimen sliding scale   SubCutaneous at bedtime  methylPREDNISolone sodium succinate Injectable 40 milliGRAM(s) IV Push every 12 hours  NIFEdipine XL 30 milliGRAM(s) Oral daily  ranolazine 500 milliGRAM(s) Oral two times a day  sertraline 50 milliGRAM(s) Oral daily  tiotropium 2.5 MICROgram(s)/olodaterol 2.5 MICROgram(s) (STIOLTO) Inhaler 2 Puff(s) Inhalation daily    MEDICATIONS  (PRN):  acetaminophen     Tablet .. 650 milliGRAM(s) Oral every 6 hours PRN Temp greater or equal to 38C (100.4F), Mild Pain (1 - 3)  aluminum hydroxide/magnesium hydroxide/simethicone Suspension 30 milliLiter(s) Oral every 4 hours PRN Dyspepsia  dextrose Oral Gel 15 Gram(s) Oral once PRN Blood Glucose LESS THAN 70 milliGRAM(s)/deciliter  melatonin 3 milliGRAM(s) Oral at bedtime PRN Insomnia  ondansetron Injectable 4 milliGRAM(s) IV Push every 8 hours PRN Nausea and/or Vomiting      I&O's Summary      PHYSICAL EXAM:  Vital Signs Last 24 Hrs  T(C): 36.5 (12 Dec 2024 07:27), Max: 36.8 (11 Dec 2024 11:36)  T(F): 97.7 (12 Dec 2024 07:27), Max: 98.2 (11 Dec 2024 11:36)  HR: 62 (12 Dec 2024 08:58) (58 - 86)  BP: 126/71 (12 Dec 2024 07:27) (123/68 - 148/77)  BP(mean): 89 (12 Dec 2024 07:27) (89 - 94)  RR: 20 (12 Dec 2024 07:27) (17 - 20)  SpO2: 95% (12 Dec 2024 08:58) (92% - 100%)    Parameters below as of 12 Dec 2024 08:58  Patient On (Oxygen Delivery Method): nasal cannula, 2 lpm      CONSTITUTIONAL: NAD, well-groomed  RESPIRATORY: Normal respiratory effort; mild course bs noted  CARDIOVASCULAR: Regular rate and rhythm, no LE edema  ABDOMEN: Nontender to palpation, normoactive bowel sounds  PSYCH: A+O x3; affect appropriate    LABS:                        13.5   19.83 )-----------( 272      ( 12 Dec 2024 08:43 )             42.2     12-12    137  |  101  |  36.3[H]  ----------------------------<  139[H]  4.8   |  25.0  |  1.93[H]    Ca    9.6      12 Dec 2024 08:43    TPro  7.4  /  Alb  4.2  /  TBili  0.6  /  DBili  x   /  AST  16  /  ALT  15  /  AlkPhos  122[H]  12-10    PT/INR - ( 10 Dec 2024 15:44 )   PT: 11.2 sec;   INR: 0.99 ratio         PTT - ( 10 Dec 2024 15:44 )  PTT:27.3 sec      Urinalysis Basic - ( 12 Dec 2024 08:43 )    Color: x / Appearance: x / SG: x / pH: x  Gluc: 139 mg/dL / Ketone: x  / Bili: x / Urobili: x   Blood: x / Protein: x / Nitrite: x   Leuk Esterase: x / RBC: x / WBC x   Sq Epi: x / Non Sq Epi: x / Bacteria: x        CAPILLARY BLOOD GLUCOSE      POCT Blood Glucose.: 132 mg/dL (12 Dec 2024 08:56)  POCT Blood Glucose.: 184 mg/dL (11 Dec 2024 21:16)  POCT Blood Glucose.: 164 mg/dL (11 Dec 2024 17:37)  POCT Blood Glucose.: 175 mg/dL (11 Dec 2024 11:57)      RADIOLOGY & ADDITIONAL TESTS:  Results Reviewed:   Imaging Personally Reviewed:  Electrocardiogram Personally Reviewed:

## 2024-12-12 NOTE — PROGRESS NOTE ADULT - SUBJECTIVE AND OBJECTIVE BOX
Patient is a 74y old  Male who presents with a chief complaint of hypoxic respiratory failure (11 Dec 2024 15:59)      Interval hx:  Pt reports he still feels fatigued and dyspneic with exertion. Reports slight cough but no phlegm. Denies fevers/chills. Remains afebrile. Remains on 3L but desaturates with ambulation. Denies wheezing. Denies LE pain.     PAST MEDICAL & SURGICAL HISTORY:  NJ (obstructive sleep apnea)  mouthpiece      Diabetes      Obesity      NJ and COPD overlap syndrome      Hypercholesteremia      History of COPD      Diverticulitis      H/O hernia repair      History of appendectomy      Medications:  NIFEdipine XL 30 milliGRAM(s) Oral daily  ranolazine 500 milliGRAM(s) Oral two times a day    albuterol/ipratropium for Nebulization 3 milliLiter(s) Nebulizer every 6 hours  tiotropium 2.5 MICROgram(s)/olodaterol 2.5 MICROgram(s) (STIOLTO) Inhaler 2 Puff(s) Inhalation daily    acetaminophen     Tablet .. 650 milliGRAM(s) Oral every 6 hours PRN  melatonin 3 milliGRAM(s) Oral at bedtime PRN  ondansetron Injectable 4 milliGRAM(s) IV Push every 8 hours PRN  sertraline 50 milliGRAM(s) Oral daily      aspirin  chewable 81 milliGRAM(s) Oral daily  heparin   Injectable 5000 Unit(s) SubCutaneous every 8 hours    aluminum hydroxide/magnesium hydroxide/simethicone Suspension 30 milliLiter(s) Oral every 4 hours PRN      atorvastatin 40 milliGRAM(s) Oral at bedtime  dextrose 50% Injectable 25 Gram(s) IV Push once  dextrose 50% Injectable 12.5 Gram(s) IV Push once  dextrose 50% Injectable 25 Gram(s) IV Push once  dextrose Oral Gel 15 Gram(s) Oral once PRN  glucagon  Injectable 1 milliGRAM(s) IntraMuscular once  insulin lispro (ADMELOG) corrective regimen sliding scale   SubCutaneous three times a day before meals  insulin lispro (ADMELOG) corrective regimen sliding scale   SubCutaneous at bedtime  methylPREDNISolone sodium succinate Injectable 40 milliGRAM(s) IV Push every 12 hours    dextrose 5%. 1000 milliLiter(s) IV Continuous <Continuous>  dextrose 5%. 1000 milliLiter(s) IV Continuous <Continuous>    influenza  Vaccine (HIGH DOSE) 0.5 milliLiter(s) IntraMuscular once              ICU Vital Signs Last 24 Hrs  T(C): 36.9 (12 Dec 2024 23:40), Max: 36.9 (12 Dec 2024 23:40)  T(F): 98.4 (12 Dec 2024 23:40), Max: 98.4 (12 Dec 2024 23:40)  HR: 65 (12 Dec 2024 23:40) (58 - 73)  BP: 147/70 (12 Dec 2024 23:40) (126/71 - 147/70)  BP(mean): 94 (12 Dec 2024 19:09) (88 - 94)  ABP: --  ABP(mean): --  RR: 18 (12 Dec 2024 23:40) (18 - 20)  SpO2: 95% (12 Dec 2024 23:40) (93% - 99%)    O2 Parameters below as of 12 Dec 2024 23:40  Patient On (Oxygen Delivery Method): nasal cannula  O2 Flow (L/min): 3              I&O's Detail    12 Dec 2024 07:01  -  12 Dec 2024 23:46  --------------------------------------------------------  IN:  Total IN: 0 mL    OUT:    Voided (mL): 620 mL  Total OUT: 620 mL    Total NET: -620 mL            LABS:                        13.5   19.83 )-----------( 272      ( 12 Dec 2024 08:43 )             42.2     12-12    137  |  101  |  36.3[H]  ----------------------------<  139[H]  4.8   |  25.0  |  1.93[H]    Ca    9.6      12 Dec 2024 08:43            CAPILLARY BLOOD GLUCOSE      POCT Blood Glucose.: 210 mg/dL (12 Dec 2024 21:46)      Urinalysis Basic - ( 12 Dec 2024 08:43 )    Color: x / Appearance: x / SG: x / pH: x  Gluc: 139 mg/dL / Ketone: x  / Bili: x / Urobili: x   Blood: x / Protein: x / Nitrite: x   Leuk Esterase: x / RBC: x / WBC x   Sq Epi: x / Non Sq Epi: x / Bacteria: x      CULTURES:  Rapid RVP Result: NotDetec (12-11 @ 08:25)      Physical Examination:    General: awake, alert, in NAD     HEENT: NC/AT, anicteric, MMM    PULM: mid to lower lung crackles, no accessory muscle use     NECK: Supple, trachea midline    CVS: S1S2, RRR    ABD: Soft, nondistended, nontender, normoactive bowel sounds    EXT: No edema, nontender    SKIN: Warm and well perfused, no rashes noted    NEURO: Alert, oriented, interactive, nonfocal    ROS: negative except as per HPI     RADIOLOGY:   < from: US Duplex Venous Lower Ext Complete, Bilateral (12.11.24 @ 15:12) >  FINDINGS:    RIGHT:  Normal compressibility of the RIGHT common femoral, femoral and popliteal   veins.  Doppler examination shows normal spontaneous and phasic flow.  No RIGHT calf vein thrombosis is detected.    LEFT:  Normal compressibility of the LEFT common femoral, femoral and popliteal   veins.  Doppler examination shows normal spontaneous and phasic flow.  No LEFT calf vein thrombosis is detected.    IMPRESSION:  No evidence of deep venous thrombosis in either lower extremity.    < end of copied text >

## 2024-12-12 NOTE — PROGRESS NOTE ADULT - ASSESSMENT
74M with hx of NJ not on CPAP, diverticulosis, hx of spontaneous L pneumothorax, HTN, HLD, DM, admission 10/2024 for atypical chest pain with CTA neg for PE but demonstrated fibrotic changes sent from pulmonary office due to progressive dyspnea over the last few weeks found to have hypoxic respiratory failure in the setting of ILD flare     Acute hypoxic respiratory failure   likely secondary to ILD flare   given worsening symptoms, agree with increasing steroid dosing   c/w Stiolto   albuterol prn   CT chest angio limited due to timing but no lobar PE   E duplex neg   check sputum cx if producing   procal not significantly elevated, remains afebrile   if persistent coughing, can start short course of abx for possible bronchitis   wean supplemental O2 as tolerated for goal sat >88-92%  OOB/ambulate   DVT ppx   prior autoimmune workup negative

## 2024-12-13 ENCOUNTER — RESULT REVIEW (OUTPATIENT)
Age: 74
End: 2024-12-13

## 2024-12-13 LAB
ANION GAP SERPL CALC-SCNC: 13 MMOL/L — SIGNIFICANT CHANGE UP (ref 5–17)
BASE EXCESS BLDA CALC-SCNC: 0.7 MMOL/L — SIGNIFICANT CHANGE UP (ref -2–3)
BLOOD GAS COMMENTS ARTERIAL: SIGNIFICANT CHANGE UP
BUN SERPL-MCNC: 37 MG/DL — HIGH (ref 8–20)
CALCIUM SERPL-MCNC: 10.2 MG/DL — SIGNIFICANT CHANGE UP (ref 8.4–10.5)
CHLORIDE SERPL-SCNC: 100 MMOL/L — SIGNIFICANT CHANGE UP (ref 96–108)
CO2 SERPL-SCNC: 25 MMOL/L — SIGNIFICANT CHANGE UP (ref 22–29)
CREAT SERPL-MCNC: 1.91 MG/DL — HIGH (ref 0.5–1.3)
EGFR: 36 ML/MIN/1.73M2 — LOW
GAS PNL BLDA: SIGNIFICANT CHANGE UP
GLUCOSE BLDC GLUCOMTR-MCNC: 137 MG/DL — HIGH (ref 70–99)
GLUCOSE BLDC GLUCOMTR-MCNC: 144 MG/DL — HIGH (ref 70–99)
GLUCOSE BLDC GLUCOMTR-MCNC: 199 MG/DL — HIGH (ref 70–99)
GLUCOSE BLDC GLUCOMTR-MCNC: 276 MG/DL — HIGH (ref 70–99)
GLUCOSE SERPL-MCNC: 156 MG/DL — HIGH (ref 70–99)
HCO3 BLDA-SCNC: 26 MMOL/L — SIGNIFICANT CHANGE UP (ref 21–28)
HCT VFR BLD CALC: 41.4 % — SIGNIFICANT CHANGE UP (ref 39–50)
HGB BLD-MCNC: 13.2 G/DL — SIGNIFICANT CHANGE UP (ref 13–17)
HOROWITZ INDEX BLDA+IHG-RTO: 0.28 — SIGNIFICANT CHANGE UP
MCHC RBC-ENTMCNC: 30.1 PG — SIGNIFICANT CHANGE UP (ref 27–34)
MCHC RBC-ENTMCNC: 31.9 G/DL — LOW (ref 32–36)
MCV RBC AUTO: 94.3 FL — SIGNIFICANT CHANGE UP (ref 80–100)
PCO2 BLDA: 45 MMHG — SIGNIFICANT CHANGE UP (ref 35–48)
PH BLDA: 7.37 — SIGNIFICANT CHANGE UP (ref 7.35–7.45)
PLATELET # BLD AUTO: 288 K/UL — SIGNIFICANT CHANGE UP (ref 150–400)
PO2 BLDA: 80 MMHG — LOW (ref 83–108)
POTASSIUM SERPL-MCNC: 5.1 MMOL/L — SIGNIFICANT CHANGE UP (ref 3.5–5.3)
POTASSIUM SERPL-SCNC: 5.1 MMOL/L — SIGNIFICANT CHANGE UP (ref 3.5–5.3)
RBC # BLD: 4.39 M/UL — SIGNIFICANT CHANGE UP (ref 4.2–5.8)
RBC # FLD: 14.6 % — HIGH (ref 10.3–14.5)
SAO2 % BLDA: 98.2 % — HIGH (ref 94–98)
SODIUM SERPL-SCNC: 138 MMOL/L — SIGNIFICANT CHANGE UP (ref 135–145)
TROPONIN T, HIGH SENSITIVITY RESULT: 14 NG/L — SIGNIFICANT CHANGE UP (ref 0–51)
WBC # BLD: 16.42 K/UL — HIGH (ref 3.8–10.5)
WBC # FLD AUTO: 16.42 K/UL — HIGH (ref 3.8–10.5)

## 2024-12-13 PROCEDURE — 99232 SBSQ HOSP IP/OBS MODERATE 35: CPT

## 2024-12-13 PROCEDURE — 71045 X-RAY EXAM CHEST 1 VIEW: CPT | Mod: 26

## 2024-12-13 PROCEDURE — 93306 TTE W/DOPPLER COMPLETE: CPT | Mod: 26

## 2024-12-13 RX ORDER — METHYLPREDNISOLONE SOD SUCC 125 MG
40 VIAL (EA) INJECTION EVERY 8 HOURS
Refills: 0 | Status: DISCONTINUED | OUTPATIENT
Start: 2024-12-13 | End: 2024-12-15

## 2024-12-13 RX ORDER — AZITHROMYCIN 250 MG/1
500 TABLET, FILM COATED ORAL EVERY 24 HOURS
Refills: 0 | Status: DISCONTINUED | OUTPATIENT
Start: 2024-12-14 | End: 2024-12-17

## 2024-12-13 RX ORDER — AZITHROMYCIN 250 MG/1
500 TABLET, FILM COATED ORAL ONCE
Refills: 0 | Status: COMPLETED | OUTPATIENT
Start: 2024-12-13 | End: 2024-12-13

## 2024-12-13 RX ORDER — AZITHROMYCIN 250 MG/1
TABLET, FILM COATED ORAL
Refills: 0 | Status: DISCONTINUED | OUTPATIENT
Start: 2024-12-13 | End: 2024-12-17

## 2024-12-13 RX ADMIN — RANOLAZINE 500 MILLIGRAM(S): 1000 TABLET, FILM COATED, EXTENDED RELEASE ORAL at 06:42

## 2024-12-13 RX ADMIN — IPRATROPIUM BROMIDE AND ALBUTEROL SULFATE 3 MILLILITER(S): 2.5; .5 SOLUTION RESPIRATORY (INHALATION) at 15:37

## 2024-12-13 RX ADMIN — AZITHROMYCIN 500 MILLIGRAM(S): 250 TABLET, FILM COATED ORAL at 12:16

## 2024-12-13 RX ADMIN — Medication 40 MILLIGRAM(S): at 22:06

## 2024-12-13 RX ADMIN — Medication 1: at 17:30

## 2024-12-13 RX ADMIN — Medication 5000 UNIT(S): at 06:42

## 2024-12-13 RX ADMIN — Medication 5000 UNIT(S): at 22:06

## 2024-12-13 RX ADMIN — Medication 40 MILLIGRAM(S): at 06:42

## 2024-12-13 RX ADMIN — SERTRALINE HYDROCHLORIDE 50 MILLIGRAM(S): 100 TABLET, FILM COATED ORAL at 12:16

## 2024-12-13 RX ADMIN — TIOTROPIUM BROMIDE AND OLODATEROL 2 PUFF(S): 3.124; 2.736 SPRAY, METERED RESPIRATORY (INHALATION) at 08:30

## 2024-12-13 RX ADMIN — Medication 5000 UNIT(S): at 13:24

## 2024-12-13 RX ADMIN — Medication 3: at 13:20

## 2024-12-13 RX ADMIN — Medication 40 MILLIGRAM(S): at 13:24

## 2024-12-13 RX ADMIN — Medication 81 MILLIGRAM(S): at 12:16

## 2024-12-13 RX ADMIN — RANOLAZINE 500 MILLIGRAM(S): 1000 TABLET, FILM COATED, EXTENDED RELEASE ORAL at 17:37

## 2024-12-13 RX ADMIN — IPRATROPIUM BROMIDE AND ALBUTEROL SULFATE 3 MILLILITER(S): 2.5; .5 SOLUTION RESPIRATORY (INHALATION) at 08:29

## 2024-12-13 NOTE — PROGRESS NOTE ADULT - SUBJECTIVE AND OBJECTIVE BOX
Aakash Fraias M.D.    Patient is a 74y old  Male who presents with a chief complaint of hypoxic respiratory failure (12 Dec 2024 17:45)      SUBJECTIVE / OVERNIGHT EVENTS: no event overnight. Still with bad hypoxia following exertion.     Patient denies chest pain, SOB, abd pain, N/V, fever, chills, dysuria or any other complaints. All remainder ROS negative.     MEDICATIONS  (STANDING):  albuterol/ipratropium for Nebulization 3 milliLiter(s) Nebulizer every 6 hours  aspirin  chewable 81 milliGRAM(s) Oral daily  atorvastatin 40 milliGRAM(s) Oral at bedtime  azithromycin  IVPB 500 milliGRAM(s) IV Intermittent once  azithromycin  IVPB      dextrose 5%. 1000 milliLiter(s) (100 mL/Hr) IV Continuous <Continuous>  dextrose 5%. 1000 milliLiter(s) (50 mL/Hr) IV Continuous <Continuous>  dextrose 50% Injectable 25 Gram(s) IV Push once  dextrose 50% Injectable 12.5 Gram(s) IV Push once  dextrose 50% Injectable 25 Gram(s) IV Push once  glucagon  Injectable 1 milliGRAM(s) IntraMuscular once  heparin   Injectable 5000 Unit(s) SubCutaneous every 8 hours  influenza  Vaccine (HIGH DOSE) 0.5 milliLiter(s) IntraMuscular once  insulin lispro (ADMELOG) corrective regimen sliding scale   SubCutaneous three times a day before meals  insulin lispro (ADMELOG) corrective regimen sliding scale   SubCutaneous at bedtime  methylPREDNISolone sodium succinate Injectable 40 milliGRAM(s) IV Push every 8 hours  NIFEdipine XL 30 milliGRAM(s) Oral daily  ranolazine 500 milliGRAM(s) Oral two times a day  sertraline 50 milliGRAM(s) Oral daily  tiotropium 2.5 MICROgram(s)/olodaterol 2.5 MICROgram(s) (STIOLTO) Inhaler 2 Puff(s) Inhalation daily    MEDICATIONS  (PRN):  acetaminophen     Tablet .. 650 milliGRAM(s) Oral every 6 hours PRN Temp greater or equal to 38C (100.4F), Mild Pain (1 - 3)  aluminum hydroxide/magnesium hydroxide/simethicone Suspension 30 milliLiter(s) Oral every 4 hours PRN Dyspepsia  dextrose Oral Gel 15 Gram(s) Oral once PRN Blood Glucose LESS THAN 70 milliGRAM(s)/deciliter  melatonin 3 milliGRAM(s) Oral at bedtime PRN Insomnia  ondansetron Injectable 4 milliGRAM(s) IV Push every 8 hours PRN Nausea and/or Vomiting      I&O's Summary    12 Dec 2024 07:01  -  13 Dec 2024 07:00  --------------------------------------------------------  IN: 0 mL / OUT: 620 mL / NET: -620 mL        PHYSICAL EXAM:  Vital Signs Last 24 Hrs  T(C): 36.5 (13 Dec 2024 08:39), Max: 36.9 (12 Dec 2024 23:40)  T(F): 97.7 (13 Dec 2024 08:39), Max: 98.4 (12 Dec 2024 23:40)  HR: 59 (13 Dec 2024 08:39) (56 - 75)  BP: 150/76 (13 Dec 2024 08:39) (134/74 - 150/76)  BP(mean): 101 (13 Dec 2024 08:39) (94 - 105)  RR: 20 (13 Dec 2024 08:39) (18 - 20)  SpO2: 96% (13 Dec 2024 08:39) (81% - 99%)    Parameters below as of 13 Dec 2024 08:39  Patient On (Oxygen Delivery Method): nasal cannula  O2 Flow (L/min): 3    CONSTITUTIONAL: NAD, well-groomed  RESPIRATORY: Normal respiratory effort; mild course bs noted  CARDIOVASCULAR: Regular rate and rhythm, no LE edema  ABDOMEN: Nontender to palpation, normoactive bowel sounds  PSYCH: A+O x3; affect appropriate    LABS:                        13.2   16.42 )-----------( 288      ( 13 Dec 2024 05:06 )             41.4     12-13    138  |  100  |  37.0[H]  ----------------------------<  156[H]  5.1   |  25.0  |  1.91[H]    Ca    10.2      13 Dec 2024 05:06            Urinalysis Basic - ( 13 Dec 2024 05:06 )    Color: x / Appearance: x / SG: x / pH: x  Gluc: 156 mg/dL / Ketone: x  / Bili: x / Urobili: x   Blood: x / Protein: x / Nitrite: x   Leuk Esterase: x / RBC: x / WBC x   Sq Epi: x / Non Sq Epi: x / Bacteria: x        CAPILLARY BLOOD GLUCOSE      POCT Blood Glucose.: 137 mg/dL (13 Dec 2024 09:09)  POCT Blood Glucose.: 210 mg/dL (12 Dec 2024 21:46)  POCT Blood Glucose.: 171 mg/dL (12 Dec 2024 18:03)  POCT Blood Glucose.: 186 mg/dL (12 Dec 2024 16:11)  POCT Blood Glucose.: 183 mg/dL (12 Dec 2024 12:18)      RADIOLOGY & ADDITIONAL TESTS:  Results Reviewed:   Imaging Personally Reviewed:  Electrocardiogram Personally Reviewed:

## 2024-12-13 NOTE — PROGRESS NOTE ADULT - ASSESSMENT
74M with hx of NJ not on CPAP, diverticulosis, hx of spontaneous L pneumothorax, HTN, HLD, DM, admission 10/2024 for atypical chest pain with CTA neg for PE but demonstrated fibrotic changes sent from pulmonary office due to progressive dyspnea over the last few weeks found to have hypoxic respiratory failure in the setting of ILD flare     Acute hypoxic respiratory failure   likely secondary to ILD flare   c/w solumedrol 40 BID   c/w Stiolto   albuterol prn   CT chest angio limited due to timing but no lobar PE   LE duplex neg   will start course of azithromycin   wean supplemental O2 as tolerated for goal sat >88-92%  OOB/ambulate   DVT ppx   prior autoimmune workup negative   Echo with EF >75%, normal RV size and function, no significant valvular disease

## 2024-12-13 NOTE — PROGRESS NOTE ADULT - SUBJECTIVE AND OBJECTIVE BOX
Patient is a 74y old  Male who presents with a chief complaint of hypoxic respiratory failure (12 Dec 2024 17:45)    Interval hx:  Reports he still feels dyspneic but improved. Reports notes it mainly with exertion. Endorses cough but remains dry. Denies n/v/abd pain. Has desaturation with ambulating. Afebrile. Maintaining sat on 3L NC.     PAST MEDICAL & SURGICAL HISTORY:  NJ (obstructive sleep apnea)  mouthpiece      Diabetes      Obesity      NJ and COPD overlap syndrome      Hypercholesteremia      History of COPD      Diverticulitis      H/O hernia repair      History of appendectomy  Medications:  azithromycin  IVPB        NIFEdipine XL 30 milliGRAM(s) Oral daily  ranolazine 500 milliGRAM(s) Oral two times a day    albuterol/ipratropium for Nebulization 3 milliLiter(s) Nebulizer every 6 hours  tiotropium 2.5 MICROgram(s)/olodaterol 2.5 MICROgram(s) (STIOLTO) Inhaler 2 Puff(s) Inhalation daily    acetaminophen     Tablet .. 650 milliGRAM(s) Oral every 6 hours PRN  melatonin 3 milliGRAM(s) Oral at bedtime PRN  ondansetron Injectable 4 milliGRAM(s) IV Push every 8 hours PRN  sertraline 50 milliGRAM(s) Oral daily      aspirin  chewable 81 milliGRAM(s) Oral daily  heparin   Injectable 5000 Unit(s) SubCutaneous every 8 hours    aluminum hydroxide/magnesium hydroxide/simethicone Suspension 30 milliLiter(s) Oral every 4 hours PRN      atorvastatin 40 milliGRAM(s) Oral at bedtime  dextrose 50% Injectable 25 Gram(s) IV Push once  dextrose 50% Injectable 12.5 Gram(s) IV Push once  dextrose 50% Injectable 25 Gram(s) IV Push once  dextrose Oral Gel 15 Gram(s) Oral once PRN  glucagon  Injectable 1 milliGRAM(s) IntraMuscular once  insulin lispro (ADMELOG) corrective regimen sliding scale   SubCutaneous three times a day before meals  insulin lispro (ADMELOG) corrective regimen sliding scale   SubCutaneous at bedtime  methylPREDNISolone sodium succinate Injectable 40 milliGRAM(s) IV Push every 8 hours    dextrose 5%. 1000 milliLiter(s) IV Continuous <Continuous>  dextrose 5%. 1000 milliLiter(s) IV Continuous <Continuous>    influenza  Vaccine (HIGH DOSE) 0.5 milliLiter(s) IntraMuscular once              ICU Vital Signs Last 24 Hrs  T(C): 36.9 (13 Dec 2024 19:34), Max: 36.9 (12 Dec 2024 23:40)  T(F): 98.4 (13 Dec 2024 19:34), Max: 98.4 (12 Dec 2024 23:40)  HR: 65 (13 Dec 2024 19:34) (56 - 75)  BP: 129/61 (13 Dec 2024 19:34) (121/62 - 150/76)  BP(mean): 101 (13 Dec 2024 08:39) (101 - 105)  ABP: --  ABP(mean): --  RR: 18 (13 Dec 2024 19:34) (18 - 20)  SpO2: 96% (13 Dec 2024 19:34) (95% - 97%)    O2 Parameters below as of 13 Dec 2024 19:34  Patient On (Oxygen Delivery Method): nasal cannula  O2 Flow (L/min): 3          ABG - ( 13 Dec 2024 04:00 )  pH, Arterial: 7.370 pH, Blood: x     /  pCO2: 45    /  pO2: 80    / HCO3: 26    / Base Excess: 0.7   /  SaO2: 98.2                I&O's Detail    12 Dec 2024 07:01  -  13 Dec 2024 07:00  --------------------------------------------------------  IN:  Total IN: 0 mL    OUT:    Voided (mL): 620 mL  Total OUT: 620 mL    Total NET: -620 mL            LABS:                        13.2   16.42 )-----------( 288      ( 13 Dec 2024 05:06 )             41.4     12-13    138  |  100  |  37.0[H]  ----------------------------<  156[H]  5.1   |  25.0  |  1.91[H]    Ca    10.2      13 Dec 2024 05:06            CAPILLARY BLOOD GLUCOSE      POCT Blood Glucose.: 199 mg/dL (13 Dec 2024 16:31)      Urinalysis Basic - ( 13 Dec 2024 05:06 )    Color: x / Appearance: x / SG: x / pH: x  Gluc: 156 mg/dL / Ketone: x  / Bili: x / Urobili: x   Blood: x / Protein: x / Nitrite: x   Leuk Esterase: x / RBC: x / WBC x   Sq Epi: x / Non Sq Epi: x / Bacteria: x      CULTURES:  Rapid RVP Result: NotDetec (12-11 @ 08:25)      Physical Examination:    General: awake, alert, in NAD     HEENT: NC/AT, anicteric, MMM    PULM: base crackles bilaterally, no accessory muscle use     NECK: Supple, trachea midline    CVS: S1S2, RRR    ABD: Soft, nondistended, nontender, normoactive bowel sounds    EXT: No edema, nontender    SKIN: Warm and well perfused, no rashes noted    NEURO: Alert, oriented, interactive, nonfocal    ROS: negative except as per HPI

## 2024-12-13 NOTE — PROGRESS NOTE ADULT - ASSESSMENT
75 y/o male with PMH of COPD, HTN, HLD, DM-2, obesity, depression, anxiety was sent to the ED from pulmonary office for hypoxia. Patient said he has been having shortness of breath and chest pressure for about 2 months. He was admitted in October for similar thing. CTA chest done and stress test done: negative; his medications were adjusted. Patient continue to have shortness of breath with exertion, noted periodic cough mostly dry.    Acute respiratory failure with hypoxia   Admit to medical floor with    CTA chest: no PE, consistent with fibrotic changes   Steroid and neb given in the ED  Pulmonology consulted, malik recs  given persistent hypoxia, will start IV steroid, taper per pulm   add azithromycin for bronchitis  check TTE, trop -- if abnormal will consult cards    DENIS on CKD  SCr mildly uptrending to 1.98, repeat 1.93  bladder scan negative for retention  trend BMP    Hx of COPD   Continue neb treatment   Oxygen therapy as needed   Follows with pulmonology (Dr. Wilkinson), malik recs    HTN/HLD   Aspirin 81mg   Nifedipine XL 30mg   Atorvastatin 40mg   Ranolazine ER 500mg bid     Depression/anxiety   Sertraline 50mg     DM-2   Hold Metformin 500mg bid   Insulin sliding scale     CKD-3   Stable   Monitor renal function     DVT prophylaxis: Heparin sc   Diet: cardiac/CHO   Dispo: pending pulm recs, TTE, improvement in resp status, will need home o2 on discharge  73 y/o male with PMH of COPD, HTN, HLD, DM-2, obesity, depression, anxiety was sent to the ED from pulmonary office for hypoxia. Patient said he has been having shortness of breath and chest pressure for about 2 months. He was admitted in October for similar thing. CTA chest done and stress test done: negative; his medications were adjusted. Patient continue to have shortness of breath with exertion, and persistent hypoxia. Pulm following.     Acute respiratory failure with hypoxia   Admit to medical floor with    CTA chest: no PE, consistent with fibrotic changes   Steroid and neb given in the ED  Pulmonology consulted, malik recs  given persistent hypoxia, will start IV steroid, taper per pulm   add azithromycin for bronchitis  check TTE, trop -- if abnormal will consult cards    DENIS on CKD  SCr mildly uptrending to 1.98, repeat 1.93  bladder scan negative for retention  trend BMP    Hx of COPD   Continue neb treatment   Oxygen therapy as needed   Follows with pulmonology (Dr. Wilkinson), malik recs    HTN/HLD   Aspirin 81mg   Nifedipine XL 30mg   Atorvastatin 40mg   Ranolazine ER 500mg bid     Depression/anxiety   Sertraline 50mg     DM-2   Hold Metformin 500mg bid   Insulin sliding scale     CKD-3   Stable   Monitor renal function     DVT prophylaxis: Heparin sc   Diet: cardiac/CHO   Dispo: pending pulm recs, TTE, improvement in resp status, will need home o2 on discharge

## 2024-12-14 LAB
ANION GAP SERPL CALC-SCNC: 10 MMOL/L — SIGNIFICANT CHANGE UP (ref 5–17)
BUN SERPL-MCNC: 44.2 MG/DL — HIGH (ref 8–20)
CALCIUM SERPL-MCNC: 9.9 MG/DL — SIGNIFICANT CHANGE UP (ref 8.4–10.5)
CHLORIDE SERPL-SCNC: 100 MMOL/L — SIGNIFICANT CHANGE UP (ref 96–108)
CO2 SERPL-SCNC: 26 MMOL/L — SIGNIFICANT CHANGE UP (ref 22–29)
CREAT SERPL-MCNC: 2.03 MG/DL — HIGH (ref 0.5–1.3)
EGFR: 33 ML/MIN/1.73M2 — LOW
GLUCOSE BLDC GLUCOMTR-MCNC: 136 MG/DL — HIGH (ref 70–99)
GLUCOSE BLDC GLUCOMTR-MCNC: 159 MG/DL — HIGH (ref 70–99)
GLUCOSE BLDC GLUCOMTR-MCNC: 175 MG/DL — HIGH (ref 70–99)
GLUCOSE BLDC GLUCOMTR-MCNC: 209 MG/DL — HIGH (ref 70–99)
GLUCOSE SERPL-MCNC: 187 MG/DL — HIGH (ref 70–99)
HCT VFR BLD CALC: 41.4 % — SIGNIFICANT CHANGE UP (ref 39–50)
HGB BLD-MCNC: 13.4 G/DL — SIGNIFICANT CHANGE UP (ref 13–17)
MCHC RBC-ENTMCNC: 30 PG — SIGNIFICANT CHANGE UP (ref 27–34)
MCHC RBC-ENTMCNC: 32.4 G/DL — SIGNIFICANT CHANGE UP (ref 32–36)
MCV RBC AUTO: 92.8 FL — SIGNIFICANT CHANGE UP (ref 80–100)
PLATELET # BLD AUTO: 279 K/UL — SIGNIFICANT CHANGE UP (ref 150–400)
POTASSIUM SERPL-MCNC: 5.3 MMOL/L — SIGNIFICANT CHANGE UP (ref 3.5–5.3)
POTASSIUM SERPL-SCNC: 5.3 MMOL/L — SIGNIFICANT CHANGE UP (ref 3.5–5.3)
RBC # BLD: 4.46 M/UL — SIGNIFICANT CHANGE UP (ref 4.2–5.8)
RBC # FLD: 14.5 % — SIGNIFICANT CHANGE UP (ref 10.3–14.5)
SODIUM SERPL-SCNC: 136 MMOL/L — SIGNIFICANT CHANGE UP (ref 135–145)
WBC # BLD: 18.84 K/UL — HIGH (ref 3.8–10.5)
WBC # FLD AUTO: 18.84 K/UL — HIGH (ref 3.8–10.5)

## 2024-12-14 PROCEDURE — 99231 SBSQ HOSP IP/OBS SF/LOW 25: CPT

## 2024-12-14 PROCEDURE — 99232 SBSQ HOSP IP/OBS MODERATE 35: CPT

## 2024-12-14 RX ADMIN — RANOLAZINE 500 MILLIGRAM(S): 1000 TABLET, FILM COATED, EXTENDED RELEASE ORAL at 18:08

## 2024-12-14 RX ADMIN — Medication 81 MILLIGRAM(S): at 12:26

## 2024-12-14 RX ADMIN — Medication 5000 UNIT(S): at 05:55

## 2024-12-14 RX ADMIN — Medication 40 MILLIGRAM(S): at 22:08

## 2024-12-14 RX ADMIN — TIOTROPIUM BROMIDE AND OLODATEROL 2 PUFF(S): 3.124; 2.736 SPRAY, METERED RESPIRATORY (INHALATION) at 13:32

## 2024-12-14 RX ADMIN — Medication 5000 UNIT(S): at 22:08

## 2024-12-14 RX ADMIN — Medication 5000 UNIT(S): at 14:28

## 2024-12-14 RX ADMIN — Medication 1: at 09:00

## 2024-12-14 RX ADMIN — Medication 1: at 12:24

## 2024-12-14 RX ADMIN — Medication 40 MILLIGRAM(S): at 05:55

## 2024-12-14 RX ADMIN — IPRATROPIUM BROMIDE AND ALBUTEROL SULFATE 3 MILLILITER(S): 2.5; .5 SOLUTION RESPIRATORY (INHALATION) at 13:30

## 2024-12-14 RX ADMIN — IPRATROPIUM BROMIDE AND ALBUTEROL SULFATE 3 MILLILITER(S): 2.5; .5 SOLUTION RESPIRATORY (INHALATION) at 08:25

## 2024-12-14 RX ADMIN — Medication 40 MILLIGRAM(S): at 14:28

## 2024-12-14 RX ADMIN — AZITHROMYCIN 255 MILLIGRAM(S): 250 TABLET, FILM COATED ORAL at 12:27

## 2024-12-14 RX ADMIN — RANOLAZINE 500 MILLIGRAM(S): 1000 TABLET, FILM COATED, EXTENDED RELEASE ORAL at 05:54

## 2024-12-14 RX ADMIN — SERTRALINE HYDROCHLORIDE 50 MILLIGRAM(S): 100 TABLET, FILM COATED ORAL at 12:26

## 2024-12-14 NOTE — PROGRESS NOTE ADULT - SUBJECTIVE AND OBJECTIVE BOX
LIA ÁLVARO  ----------------------------------------  The patient was seen earlier at bedside. Patient with hypoxia. Noted dyspnea with exertion. Described some left chest wall pain.    Vital Signs Last 24 Hrs  T(C): 36.4 (14 Dec 2024 11:34), Max: 37 (14 Dec 2024 00:05)  T(F): 97.5 (14 Dec 2024 11:34), Max: 98.6 (14 Dec 2024 00:05)  HR: 68 (14 Dec 2024 11:34) (60 - 74)  BP: 126/74 (14 Dec 2024 11:34) (121/62 - 137/76)  BP(mean): --  RR: 18 (14 Dec 2024 11:34) (18 - 18)  SpO2: 96% (14 Dec 2024 11:34) (94% - 97%)    Parameters below as of 14 Dec 2024 11:34  Patient On (Oxygen Delivery Method): nasal cannula  O2 Flow (L/min): 3    CAPILLARY BLOOD GLUCOSE  POCT Blood Glucose.: 175 mg/dL (14 Dec 2024 12:17)  POCT Blood Glucose.: 159 mg/dL (14 Dec 2024 08:50)  POCT Blood Glucose.: 144 mg/dL (13 Dec 2024 22:01)  POCT Blood Glucose.: 199 mg/dL (13 Dec 2024 16:31)    PHYSICAL EXAMINATION:  ----------------------------------------  General appearance: No acute distress, Awake, Alert  HEENT: Normocephalic, Atraumatic, Conjunctiva clear, EOMI  Neck: Supple, No JVD, No tenderness  Lungs: Breath sound equal bilaterally, No wheezes, Basilar rales  Cardiovascular: S1S2, Regular rhythm  Abdomen: Soft, Nontender, Nondistended, No guarding/rebound, Positive bowel sounds  Extremities: No clubbing, No cyanosis, No edema, No calf tenderness  Neuro: Strength equal bilaterally, No tremors  Psychiatric: Appropriate mood, Normal affect    LABORATORY STUDIES:  ----------------------------------------             13.4   18.84 )-----------( 279      ( 14 Dec 2024 05:03 )             41.4     12-14    136  |  100  |  44.2[H]  ----------------------------<  187[H]  5.3   |  26.0  |  2.03[H]    Ca    9.9      14 Dec 2024 05:03    Urinalysis Basic - ( 14 Dec 2024 05:03 )  Color: x / Appearance: x / SG: x / pH: x  Gluc: 187 mg/dL / Ketone: x  / Bili: x / Urobili: x   Blood: x / Protein: x / Nitrite: x   Leuk Esterase: x / RBC: x / WBC x   Sq Epi: x / Non Sq Epi: x / Bacteria: x    MEDICATIONS  (STANDING):  albuterol/ipratropium for Nebulization 3 milliLiter(s) Nebulizer every 6 hours  aspirin  chewable 81 milliGRAM(s) Oral daily  atorvastatin 40 milliGRAM(s) Oral at bedtime  azithromycin  IVPB 500 milliGRAM(s) IV Intermittent every 24 hours  azithromycin  IVPB      dextrose 5%. 1000 milliLiter(s) (100 mL/Hr) IV Continuous <Continuous>  dextrose 5%. 1000 milliLiter(s) (50 mL/Hr) IV Continuous <Continuous>  dextrose 50% Injectable 25 Gram(s) IV Push once  dextrose 50% Injectable 12.5 Gram(s) IV Push once  dextrose 50% Injectable 25 Gram(s) IV Push once  glucagon  Injectable 1 milliGRAM(s) IntraMuscular once  heparin   Injectable 5000 Unit(s) SubCutaneous every 8 hours  influenza  Vaccine (HIGH DOSE) 0.5 milliLiter(s) IntraMuscular once  insulin lispro (ADMELOG) corrective regimen sliding scale   SubCutaneous three times a day before meals  insulin lispro (ADMELOG) corrective regimen sliding scale   SubCutaneous at bedtime  methylPREDNISolone sodium succinate Injectable 40 milliGRAM(s) IV Push every 8 hours  NIFEdipine XL 30 milliGRAM(s) Oral daily  ranolazine 500 milliGRAM(s) Oral two times a day  sertraline 50 milliGRAM(s) Oral daily  tiotropium 2.5 MICROgram(s)/olodaterol 2.5 MICROgram(s) (STIOLTO) Inhaler 2 Puff(s) Inhalation daily    MEDICATIONS  (PRN):  acetaminophen     Tablet .. 650 milliGRAM(s) Oral every 6 hours PRN Temp greater or equal to 38C (100.4F), Mild Pain (1 - 3)  aluminum hydroxide/magnesium hydroxide/simethicone Suspension 30 milliLiter(s) Oral every 4 hours PRN Dyspepsia  dextrose Oral Gel 15 Gram(s) Oral once PRN Blood Glucose LESS THAN 70 milliGRAM(s)/deciliter  melatonin 3 milliGRAM(s) Oral at bedtime PRN Insomnia  ondansetron Injectable 4 milliGRAM(s) IV Push every 8 hours PRN Nausea and/or Vomiting      ASSESSMENT / PLAN:  ----------------------------------------  74M with a history of COPD, diabetes, hypertension, anxiety, and depression, who was referred to the hospital with complaints of dyspnea and chest discomfort over the past two months and was found to have hypoxia.     Acute hypoxic respiratory failure  - CT of the chest noted fine fibrotic changes, bronchiectasis/bronchiolectasis, as well as cystic changes  - No pulmonary embolus  - For titration of on supplemental oxygen as tolerated  - On methylprednisolone  - Discussed with Pulmonary  - On azithromycin    Chest discomfort  - Suspect muscular in origin  - Troponin level was not elevated  - Echocardiogram was without wall motion abnormalities  - Recent nuclear stress test was negative    Chronic kidney disease III with acute kidney injury  - Monitoring renal function  - No urine retention on bladder scan    Hypertension / Hyperlipidemia  - Close blood pressure monitoring  - Nifedipine  - Continue on aspirin, atorvastatin, and ranolazine    Anxiety / Depression  - On sertraline    Diabetes  - Metformin to be held  - Insulin coverage  - Close monitoring of blood glucose levels        Dispo: Anticipate discharge home with supplemental oxygen in 3-4 days pending response to treatment LIA ÁLVARO  ----------------------------------------  The patient was seen earlier at bedside. Patient with hypoxia. Noted dyspnea with exertion. Described some left chest wall pain.    Vital Signs Last 24 Hrs  T(C): 36.4 (14 Dec 2024 11:34), Max: 37 (14 Dec 2024 00:05)  T(F): 97.5 (14 Dec 2024 11:34), Max: 98.6 (14 Dec 2024 00:05)  HR: 68 (14 Dec 2024 11:34) (60 - 74)  BP: 126/74 (14 Dec 2024 11:34) (121/62 - 137/76)  BP(mean): --  RR: 18 (14 Dec 2024 11:34) (18 - 18)  SpO2: 96% (14 Dec 2024 11:34) (94% - 97%)    Parameters below as of 14 Dec 2024 11:34  Patient On (Oxygen Delivery Method): nasal cannula  O2 Flow (L/min): 3    CAPILLARY BLOOD GLUCOSE  POCT Blood Glucose.: 175 mg/dL (14 Dec 2024 12:17)  POCT Blood Glucose.: 159 mg/dL (14 Dec 2024 08:50)  POCT Blood Glucose.: 144 mg/dL (13 Dec 2024 22:01)  POCT Blood Glucose.: 199 mg/dL (13 Dec 2024 16:31)    PHYSICAL EXAMINATION:  ----------------------------------------  General appearance: No acute distress, Awake, Alert  HEENT: Normocephalic, Atraumatic, Conjunctiva clear, EOMI  Neck: Supple, No JVD, No tenderness  Lungs: Breath sound equal bilaterally, No wheezes, Basilar rales  Cardiovascular: S1S2, Regular rhythm  Abdomen: Soft, Nontender, Nondistended, No guarding/rebound, Positive bowel sounds  Extremities: No clubbing, No cyanosis, No edema, No calf tenderness  Neuro: Strength equal bilaterally, No tremors  Psychiatric: Appropriate mood, Normal affect    LABORATORY STUDIES:  ----------------------------------------             13.4   18.84 )-----------( 279      ( 14 Dec 2024 05:03 )             41.4     12-14    136  |  100  |  44.2[H]  ----------------------------<  187[H]  5.3   |  26.0  |  2.03[H]    Ca    9.9      14 Dec 2024 05:03    Urinalysis Basic - ( 14 Dec 2024 05:03 )  Color: x / Appearance: x / SG: x / pH: x  Gluc: 187 mg/dL / Ketone: x  / Bili: x / Urobili: x   Blood: x / Protein: x / Nitrite: x   Leuk Esterase: x / RBC: x / WBC x   Sq Epi: x / Non Sq Epi: x / Bacteria: x    MEDICATIONS  (STANDING):  albuterol/ipratropium for Nebulization 3 milliLiter(s) Nebulizer every 6 hours  aspirin  chewable 81 milliGRAM(s) Oral daily  atorvastatin 40 milliGRAM(s) Oral at bedtime  azithromycin  IVPB 500 milliGRAM(s) IV Intermittent every 24 hours  azithromycin  IVPB      dextrose 5%. 1000 milliLiter(s) (100 mL/Hr) IV Continuous <Continuous>  dextrose 5%. 1000 milliLiter(s) (50 mL/Hr) IV Continuous <Continuous>  dextrose 50% Injectable 25 Gram(s) IV Push once  dextrose 50% Injectable 12.5 Gram(s) IV Push once  dextrose 50% Injectable 25 Gram(s) IV Push once  glucagon  Injectable 1 milliGRAM(s) IntraMuscular once  heparin   Injectable 5000 Unit(s) SubCutaneous every 8 hours  influenza  Vaccine (HIGH DOSE) 0.5 milliLiter(s) IntraMuscular once  insulin lispro (ADMELOG) corrective regimen sliding scale   SubCutaneous three times a day before meals  insulin lispro (ADMELOG) corrective regimen sliding scale   SubCutaneous at bedtime  methylPREDNISolone sodium succinate Injectable 40 milliGRAM(s) IV Push every 8 hours  NIFEdipine XL 30 milliGRAM(s) Oral daily  ranolazine 500 milliGRAM(s) Oral two times a day  sertraline 50 milliGRAM(s) Oral daily  tiotropium 2.5 MICROgram(s)/olodaterol 2.5 MICROgram(s) (STIOLTO) Inhaler 2 Puff(s) Inhalation daily    MEDICATIONS  (PRN):  acetaminophen     Tablet .. 650 milliGRAM(s) Oral every 6 hours PRN Temp greater or equal to 38C (100.4F), Mild Pain (1 - 3)  aluminum hydroxide/magnesium hydroxide/simethicone Suspension 30 milliLiter(s) Oral every 4 hours PRN Dyspepsia  dextrose Oral Gel 15 Gram(s) Oral once PRN Blood Glucose LESS THAN 70 milliGRAM(s)/deciliter  melatonin 3 milliGRAM(s) Oral at bedtime PRN Insomnia  ondansetron Injectable 4 milliGRAM(s) IV Push every 8 hours PRN Nausea and/or Vomiting      ASSESSMENT / PLAN:  ----------------------------------------  74M with a history of COPD, diabetes, hypertension, anxiety, and depression, who was referred to the hospital with complaints of dyspnea and chest discomfort over the past two months and was found to have hypoxia.     Acute hypoxic respiratory failure / COPD  - CT of the chest noted fine fibrotic changes, bronchiectasis/bronchiolectasis, as well as cystic changes  - No pulmonary embolus  - For titration of on supplemental oxygen as tolerated  - On methylprednisolone taper  - Discussed with Pulmonary  - On azithromycin  - Continue on tiotropium/olodaterol    Chest discomfort  - Suspect muscular in origin  - Troponin level was not elevated  - Echocardiogram was without wall motion abnormalities  - Recent nuclear stress test was negative    Chronic kidney disease III with acute kidney injury  - Monitoring renal function  - No urine retention on bladder scan    Hypertension / Hyperlipidemia  - Close blood pressure monitoring  - Nifedipine  - Continue on aspirin, atorvastatin, and ranolazine    Anxiety / Depression  - On sertraline    Diabetes  - Metformin to be held  - Insulin coverage  - Close monitoring of blood glucose levels        Dispo: Anticipate discharge home with supplemental oxygen in 3-4 days pending response to treatment

## 2024-12-14 NOTE — PROGRESS NOTE ADULT - ASSESSMENT
74M with hx of NJ not on CPAP, diverticulosis, hx of spontaneous L pneumothorax, HTN, HLD, DM, admission 10/2024 for atypical chest pain with CTA neg for PE but demonstrated fibrotic changes sent from pulmonary office due to progressive dyspnea over the last few weeks found to have hypoxic respiratory failure in the setting of ILD flare     Acute hypoxic respiratory failure   likely secondary to ILD flare   c/w solumedrol 40 BID and wean in AM if continued improvement    c/w Stiolto   albuterol prn   CT chest angio limited due to timing but no lobar PE   LE duplex neg   complete course of azithromycin   check sputum if producing  wean supplemental O2 as tolerated for goal sat >88-92%  OOB/ambulate   DVT ppx   prior autoimmune workup negative   Echo with EF >75%, normal RV size and function, no significant valvular disease    recent nuclear stress neg for ischemia    further chest pain workup per primary

## 2024-12-15 LAB
ANION GAP SERPL CALC-SCNC: 10 MMOL/L — SIGNIFICANT CHANGE UP (ref 5–17)
BUN SERPL-MCNC: 44.3 MG/DL — HIGH (ref 8–20)
CALCIUM SERPL-MCNC: 10.2 MG/DL — SIGNIFICANT CHANGE UP (ref 8.4–10.5)
CHLORIDE SERPL-SCNC: 100 MMOL/L — SIGNIFICANT CHANGE UP (ref 96–108)
CO2 SERPL-SCNC: 27 MMOL/L — SIGNIFICANT CHANGE UP (ref 22–29)
CREAT SERPL-MCNC: 1.86 MG/DL — HIGH (ref 0.5–1.3)
EGFR: 37 ML/MIN/1.73M2 — LOW
GLUCOSE BLDC GLUCOMTR-MCNC: 155 MG/DL — HIGH (ref 70–99)
GLUCOSE BLDC GLUCOMTR-MCNC: 207 MG/DL — HIGH (ref 70–99)
GLUCOSE BLDC GLUCOMTR-MCNC: 235 MG/DL — HIGH (ref 70–99)
GLUCOSE BLDC GLUCOMTR-MCNC: 253 MG/DL — HIGH (ref 70–99)
GLUCOSE SERPL-MCNC: 211 MG/DL — HIGH (ref 70–99)
GRAM STN FLD: ABNORMAL
HCT VFR BLD CALC: 42.8 % — SIGNIFICANT CHANGE UP (ref 39–50)
HGB BLD-MCNC: 14 G/DL — SIGNIFICANT CHANGE UP (ref 13–17)
MCHC RBC-ENTMCNC: 30.4 PG — SIGNIFICANT CHANGE UP (ref 27–34)
MCHC RBC-ENTMCNC: 32.7 G/DL — SIGNIFICANT CHANGE UP (ref 32–36)
MCV RBC AUTO: 92.8 FL — SIGNIFICANT CHANGE UP (ref 80–100)
PLATELET # BLD AUTO: 307 K/UL — SIGNIFICANT CHANGE UP (ref 150–400)
POTASSIUM SERPL-MCNC: 5.1 MMOL/L — SIGNIFICANT CHANGE UP (ref 3.5–5.3)
POTASSIUM SERPL-SCNC: 5.1 MMOL/L — SIGNIFICANT CHANGE UP (ref 3.5–5.3)
RBC # BLD: 4.61 M/UL — SIGNIFICANT CHANGE UP (ref 4.2–5.8)
RBC # FLD: 14.4 % — SIGNIFICANT CHANGE UP (ref 10.3–14.5)
SODIUM SERPL-SCNC: 137 MMOL/L — SIGNIFICANT CHANGE UP (ref 135–145)
SPECIMEN SOURCE: SIGNIFICANT CHANGE UP
WBC # BLD: 19.39 K/UL — HIGH (ref 3.8–10.5)
WBC # FLD AUTO: 19.39 K/UL — HIGH (ref 3.8–10.5)

## 2024-12-15 PROCEDURE — 99232 SBSQ HOSP IP/OBS MODERATE 35: CPT

## 2024-12-15 RX ORDER — METHYLPREDNISOLONE SOD SUCC 125 MG
40 VIAL (EA) INJECTION
Refills: 0 | Status: DISCONTINUED | OUTPATIENT
Start: 2024-12-15 | End: 2024-12-16

## 2024-12-15 RX ADMIN — Medication 5000 UNIT(S): at 05:52

## 2024-12-15 RX ADMIN — Medication 5000 UNIT(S): at 13:25

## 2024-12-15 RX ADMIN — Medication 1: at 23:00

## 2024-12-15 RX ADMIN — Medication 81 MILLIGRAM(S): at 10:10

## 2024-12-15 RX ADMIN — IPRATROPIUM BROMIDE AND ALBUTEROL SULFATE 3 MILLILITER(S): 2.5; .5 SOLUTION RESPIRATORY (INHALATION) at 13:57

## 2024-12-15 RX ADMIN — RANOLAZINE 500 MILLIGRAM(S): 1000 TABLET, FILM COATED, EXTENDED RELEASE ORAL at 17:09

## 2024-12-15 RX ADMIN — IPRATROPIUM BROMIDE AND ALBUTEROL SULFATE 3 MILLILITER(S): 2.5; .5 SOLUTION RESPIRATORY (INHALATION) at 09:53

## 2024-12-15 RX ADMIN — Medication 1: at 08:28

## 2024-12-15 RX ADMIN — Medication 5000 UNIT(S): at 23:00

## 2024-12-15 RX ADMIN — RANOLAZINE 500 MILLIGRAM(S): 1000 TABLET, FILM COATED, EXTENDED RELEASE ORAL at 05:52

## 2024-12-15 RX ADMIN — IPRATROPIUM BROMIDE AND ALBUTEROL SULFATE 3 MILLILITER(S): 2.5; .5 SOLUTION RESPIRATORY (INHALATION) at 21:25

## 2024-12-15 RX ADMIN — Medication 40 MILLIGRAM(S): at 23:00

## 2024-12-15 RX ADMIN — Medication 40 MILLIGRAM(S): at 05:52

## 2024-12-15 RX ADMIN — SERTRALINE HYDROCHLORIDE 50 MILLIGRAM(S): 100 TABLET, FILM COATED ORAL at 10:10

## 2024-12-15 RX ADMIN — Medication 2: at 13:26

## 2024-12-15 RX ADMIN — AZITHROMYCIN 255 MILLIGRAM(S): 250 TABLET, FILM COATED ORAL at 10:10

## 2024-12-15 RX ADMIN — Medication 40 MILLIGRAM(S): at 17:10

## 2024-12-15 RX ADMIN — Medication 2: at 17:30

## 2024-12-15 NOTE — PROGRESS NOTE ADULT - SUBJECTIVE AND OBJECTIVE BOX
LIA ÁLVARO  ----------------------------------------  The patient was seen earlier at bedside. Patient with hypoxia. Offered no complaints.    Vital Signs Last 24 Hrs  T(C): 36.6 (15 Dec 2024 08:24), Max: 36.7 (14 Dec 2024 23:10)  T(F): 97.9 (15 Dec 2024 08:24), Max: 98.1 (14 Dec 2024 23:10)  HR: 55 (15 Dec 2024 08:24) (55 - 71)  BP: 137/78 (15 Dec 2024 08:24) (126/74 - 162/75)  BP(mean): 89 (14 Dec 2024 15:59) (89 - 89)  RR: 20 (15 Dec 2024 08:24) (18 - 20)  SpO2: 97% (15 Dec 2024 08:24) (96% - 97%)    Parameters below as of 15 Dec 2024 08:24  Patient On (Oxygen Delivery Method): nasal cannula  O2 Flow (L/min): 3    CAPILLARY BLOOD GLUCOSE  POCT Blood Glucose.: 155 mg/dL (15 Dec 2024 08:19)  POCT Blood Glucose.: 209 mg/dL (14 Dec 2024 22:06)  POCT Blood Glucose.: 136 mg/dL (14 Dec 2024 18:03)  POCT Blood Glucose.: 175 mg/dL (14 Dec 2024 12:17)    PHYSICAL EXAMINATION:  ----------------------------------------  General appearance: No acute distress, Awake, Alert  HEENT: Normocephalic, Atraumatic, Conjunctiva clear, EOMI  Neck: Supple, No JVD, No tenderness  Lungs: Breath sound equal bilaterally, No wheezes, Basilar rales  Cardiovascular: S1S2, Regular rhythm  Abdomen: Soft, Nontender, Nondistended, No guarding/rebound, Positive bowel sounds  Extremities: No clubbing, No cyanosis, No edema, No calf tenderness  Neuro: Strength equal bilaterally, No tremors  Psychiatric: Appropriate mood, Normal affect    LABORATORY STUDIES:  ----------------------------------------             14.0   19.39 )-----------( 307      ( 15 Dec 2024 04:30 )             42.8     12-15    137  |  100  |  44.3[H]  ----------------------------<  211[H]  5.1   |  27.0  |  1.86[H]    Ca    10.2      15 Dec 2024 04:30    Urinalysis Basic - ( 15 Dec 2024 04:30 )  Color: x / Appearance: x / SG: x / pH: x  Gluc: 211 mg/dL / Ketone: x  / Bili: x / Urobili: x   Blood: x / Protein: x / Nitrite: x   Leuk Esterase: x / RBC: x / WBC x   Sq Epi: x / Non Sq Epi: x / Bacteria: x    MEDICATIONS  (STANDING):  albuterol/ipratropium for Nebulization 3 milliLiter(s) Nebulizer every 6 hours  aspirin  chewable 81 milliGRAM(s) Oral daily  atorvastatin 40 milliGRAM(s) Oral at bedtime  azithromycin  IVPB 500 milliGRAM(s) IV Intermittent every 24 hours  azithromycin  IVPB      dextrose 5%. 1000 milliLiter(s) (100 mL/Hr) IV Continuous <Continuous>  dextrose 5%. 1000 milliLiter(s) (50 mL/Hr) IV Continuous <Continuous>  dextrose 50% Injectable 25 Gram(s) IV Push once  dextrose 50% Injectable 12.5 Gram(s) IV Push once  dextrose 50% Injectable 25 Gram(s) IV Push once  glucagon  Injectable 1 milliGRAM(s) IntraMuscular once  heparin   Injectable 5000 Unit(s) SubCutaneous every 8 hours  influenza  Vaccine (HIGH DOSE) 0.5 milliLiter(s) IntraMuscular once  insulin lispro (ADMELOG) corrective regimen sliding scale   SubCutaneous three times a day before meals  insulin lispro (ADMELOG) corrective regimen sliding scale   SubCutaneous at bedtime  methylPREDNISolone sodium succinate Injectable 40 milliGRAM(s) IV Push two times a day  NIFEdipine XL 30 milliGRAM(s) Oral daily  ranolazine 500 milliGRAM(s) Oral two times a day  sertraline 50 milliGRAM(s) Oral daily  tiotropium 2.5 MICROgram(s)/olodaterol 2.5 MICROgram(s) (STIOLTO) Inhaler 2 Puff(s) Inhalation daily    MEDICATIONS  (PRN):  acetaminophen     Tablet .. 650 milliGRAM(s) Oral every 6 hours PRN Temp greater or equal to 38C (100.4F), Mild Pain (1 - 3)  aluminum hydroxide/magnesium hydroxide/simethicone Suspension 30 milliLiter(s) Oral every 4 hours PRN Dyspepsia  dextrose Oral Gel 15 Gram(s) Oral once PRN Blood Glucose LESS THAN 70 milliGRAM(s)/deciliter  melatonin 3 milliGRAM(s) Oral at bedtime PRN Insomnia  ondansetron Injectable 4 milliGRAM(s) IV Push every 8 hours PRN Nausea and/or Vomiting      ASSESSMENT / PLAN:  ----------------------------------------  74M with a history of COPD, diabetes, hypertension, anxiety, and depression, who was referred to the hospital with complaints of dyspnea and chest discomfort over the past two months and was found to have hypoxia. Supplemental oxygen and intravenous methylprednisolone were initiated as well as azithromycin for possible bronchitis.    Acute hypoxic respiratory failure / COPD  - CT of the chest noted fine fibrotic changes, bronchiectasis/bronchiolectasis, as well as cystic changes  - No pulmonary embolus  - On methylprednisolone taper  - On azithromycin  - Continue on tiotropium/olodaterol  - For titration of supplemental oxygen as tolerated    Chest discomfort  - Suspect muscular in origin  - Troponin level was not elevated  - Echocardiogram was without wall motion abnormalities  - Recent nuclear stress test was negative    Chronic kidney disease III with acute kidney injury  - Monitoring renal function  - No urine retention on bladder scan    Hypertension / Hyperlipidemia  - Close blood pressure monitoring  - Nifedipine  - Continue on aspirin, atorvastatin, and ranolazine    Anxiety / Depression  - On sertraline    Diabetes  - Metformin to be held for now  - Insulin coverage  - Close monitoring of blood glucose levels        Dispo: Anticipate discharge home with supplemental oxygen in 2-3 days pending response to treatment

## 2024-12-16 ENCOUNTER — TRANSCRIPTION ENCOUNTER (OUTPATIENT)
Age: 74
End: 2024-12-16

## 2024-12-16 LAB
ANION GAP SERPL CALC-SCNC: 10 MMOL/L — SIGNIFICANT CHANGE UP (ref 5–17)
BUN SERPL-MCNC: 46 MG/DL — HIGH (ref 8–20)
CALCIUM SERPL-MCNC: 9.9 MG/DL — SIGNIFICANT CHANGE UP (ref 8.4–10.5)
CHLORIDE SERPL-SCNC: 100 MMOL/L — SIGNIFICANT CHANGE UP (ref 96–108)
CO2 SERPL-SCNC: 27 MMOL/L — SIGNIFICANT CHANGE UP (ref 22–29)
CREAT SERPL-MCNC: 2.08 MG/DL — HIGH (ref 0.5–1.3)
CULTURE RESULTS: ABNORMAL
EGFR: 32 ML/MIN/1.73M2 — LOW
GLUCOSE BLDC GLUCOMTR-MCNC: 132 MG/DL — HIGH (ref 70–99)
GLUCOSE BLDC GLUCOMTR-MCNC: 176 MG/DL — HIGH (ref 70–99)
GLUCOSE BLDC GLUCOMTR-MCNC: 221 MG/DL — HIGH (ref 70–99)
GLUCOSE BLDC GLUCOMTR-MCNC: 269 MG/DL — HIGH (ref 70–99)
GLUCOSE SERPL-MCNC: 218 MG/DL — HIGH (ref 70–99)
HCT VFR BLD CALC: 42.8 % — SIGNIFICANT CHANGE UP (ref 39–50)
HGB BLD-MCNC: 13.9 G/DL — SIGNIFICANT CHANGE UP (ref 13–17)
MCHC RBC-ENTMCNC: 30 PG — SIGNIFICANT CHANGE UP (ref 27–34)
MCHC RBC-ENTMCNC: 32.5 G/DL — SIGNIFICANT CHANGE UP (ref 32–36)
MCV RBC AUTO: 92.2 FL — SIGNIFICANT CHANGE UP (ref 80–100)
PLATELET # BLD AUTO: 282 K/UL — SIGNIFICANT CHANGE UP (ref 150–400)
POTASSIUM SERPL-MCNC: 5.4 MMOL/L — HIGH (ref 3.5–5.3)
POTASSIUM SERPL-SCNC: 5.4 MMOL/L — HIGH (ref 3.5–5.3)
RBC # BLD: 4.64 M/UL — SIGNIFICANT CHANGE UP (ref 4.2–5.8)
RBC # FLD: 14.1 % — SIGNIFICANT CHANGE UP (ref 10.3–14.5)
SODIUM SERPL-SCNC: 137 MMOL/L — SIGNIFICANT CHANGE UP (ref 135–145)
SPECIMEN SOURCE: SIGNIFICANT CHANGE UP
WBC # BLD: 18.53 K/UL — HIGH (ref 3.8–10.5)
WBC # FLD AUTO: 18.53 K/UL — HIGH (ref 3.8–10.5)

## 2024-12-16 PROCEDURE — 99232 SBSQ HOSP IP/OBS MODERATE 35: CPT

## 2024-12-16 RX ORDER — PREDNISONE 20 MG/1
40 TABLET ORAL DAILY
Refills: 0 | Status: DISCONTINUED | OUTPATIENT
Start: 2024-12-17 | End: 2024-12-17

## 2024-12-16 RX ORDER — POLYETHYLENE GLYCOL 3350 17 G/17G
17 POWDER, FOR SOLUTION ORAL DAILY
Refills: 0 | Status: DISCONTINUED | OUTPATIENT
Start: 2024-12-16 | End: 2024-12-17

## 2024-12-16 RX ADMIN — Medication 40 MILLIGRAM(S): at 07:00

## 2024-12-16 RX ADMIN — IPRATROPIUM BROMIDE AND ALBUTEROL SULFATE 3 MILLILITER(S): 2.5; .5 SOLUTION RESPIRATORY (INHALATION) at 08:43

## 2024-12-16 RX ADMIN — Medication 5000 UNIT(S): at 07:01

## 2024-12-16 RX ADMIN — IPRATROPIUM BROMIDE AND ALBUTEROL SULFATE 3 MILLILITER(S): 2.5; .5 SOLUTION RESPIRATORY (INHALATION) at 21:00

## 2024-12-16 RX ADMIN — Medication 5000 UNIT(S): at 22:19

## 2024-12-16 RX ADMIN — RANOLAZINE 500 MILLIGRAM(S): 1000 TABLET, FILM COATED, EXTENDED RELEASE ORAL at 17:22

## 2024-12-16 RX ADMIN — Medication 3: at 12:46

## 2024-12-16 RX ADMIN — Medication 5000 UNIT(S): at 12:47

## 2024-12-16 RX ADMIN — Medication 40 MILLIGRAM(S): at 22:19

## 2024-12-16 RX ADMIN — RANOLAZINE 500 MILLIGRAM(S): 1000 TABLET, FILM COATED, EXTENDED RELEASE ORAL at 07:01

## 2024-12-16 RX ADMIN — SERTRALINE HYDROCHLORIDE 50 MILLIGRAM(S): 100 TABLET, FILM COATED ORAL at 09:50

## 2024-12-16 RX ADMIN — POLYETHYLENE GLYCOL 3350 17 GRAM(S): 17 POWDER, FOR SOLUTION ORAL at 09:54

## 2024-12-16 RX ADMIN — Medication 1: at 17:22

## 2024-12-16 RX ADMIN — AZITHROMYCIN 255 MILLIGRAM(S): 250 TABLET, FILM COATED ORAL at 09:49

## 2024-12-16 RX ADMIN — IPRATROPIUM BROMIDE AND ALBUTEROL SULFATE 3 MILLILITER(S): 2.5; .5 SOLUTION RESPIRATORY (INHALATION) at 14:37

## 2024-12-16 RX ADMIN — Medication 81 MILLIGRAM(S): at 09:49

## 2024-12-16 NOTE — PROGRESS NOTE ADULT - SUBJECTIVE AND OBJECTIVE BOX
LIA ÁLVARO  ----------------------------------------  The patient was seen earlier at bedside. Patient with hypoxia. Noted some left chest wall pain reproducible with palpation.    Vital Signs Last 24 Hrs  T(C): 36.8 (16 Dec 2024 11:09), Max: 36.8 (16 Dec 2024 11:09)  T(F): 98.2 (16 Dec 2024 11:09), Max: 98.2 (16 Dec 2024 11:09)  HR: 69 (16 Dec 2024 11:09) (57 - 75)  BP: 135/63 (16 Dec 2024 11:09) (131/76 - 151/80)  BP(mean): 87 (16 Dec 2024 11:09) (87 - 95)  RR: 18 (16 Dec 2024 11:09) (18 - 19)  SpO2: 91% (16 Dec 2024 11:09) (90% - 96%)    Parameters below as of 16 Dec 2024 11:09  Patient On (Oxygen Delivery Method): room air    CAPILLARY BLOOD GLUCOSE  POCT Blood Glucose.: 132 mg/dL (16 Dec 2024 08:26)  POCT Blood Glucose.: 253 mg/dL (15 Dec 2024 22:55)  POCT Blood Glucose.: 235 mg/dL (15 Dec 2024 17:20)  POCT Blood Glucose.: 207 mg/dL (15 Dec 2024 12:31)    PHYSICAL EXAMINATION:  ----------------------------------------  General appearance: No acute distress, Awake, Alert  HEENT: Normocephalic, Atraumatic, Conjunctiva clear, EOMI  Neck: Supple, No JVD, No tenderness  Lungs: Breath sound equal bilaterally, No wheezes, Basilar rales  Cardiovascular: S1S2, Regular rhythm  Abdomen: Soft, Nontender, Nondistended, No guarding/rebound, Positive bowel sounds  Extremities: No clubbing, No cyanosis, No edema, No calf tenderness  Neuro: Strength equal bilaterally, No tremors  Psychiatric: Appropriate mood, Normal affect    LABORATORY STUDIES:  ----------------------------------------             13.9   18.53 )-----------( 282      ( 16 Dec 2024 03:22 )             42.8     12-16    137  |  100  |  46.0[H]  ----------------------------<  218[H]  5.4[H]   |  27.0  |  2.08[H]    Ca    9.9      16 Dec 2024 03:22    Urinalysis Basic - ( 16 Dec 2024 03:22 )  Color: x / Appearance: x / SG: x / pH: x  Gluc: 218 mg/dL / Ketone: x  / Bili: x / Urobili: x   Blood: x / Protein: x / Nitrite: x   Leuk Esterase: x / RBC: x / WBC x   Sq Epi: x / Non Sq Epi: x / Bacteria: x    Culture - Sputum (collected 15 Dec 2024 06:50)  Source: .Sputum Sputum  Gram Stain (15 Dec 2024 19:55):    Rare polymorphonuclear leukocytes per low power field    Rare Squamous epithelial cells per low power field    Moderate Gram Positive Cocci in Pairs and Chains per oil power field    Few Gram Positive Rods per oil power field    Few Gram Negative Rods per oil power field  Preliminary Report (16 Dec 2024 11:50):    Commensal janusz consistent with body site        MEDICATIONS  (STANDING):  albuterol/ipratropium for Nebulization 3 milliLiter(s) Nebulizer every 6 hours  aspirin  chewable 81 milliGRAM(s) Oral daily  atorvastatin 40 milliGRAM(s) Oral at bedtime  azithromycin  IVPB 500 milliGRAM(s) IV Intermittent every 24 hours  azithromycin  IVPB      dextrose 5%. 1000 milliLiter(s) (100 mL/Hr) IV Continuous <Continuous>  dextrose 5%. 1000 milliLiter(s) (50 mL/Hr) IV Continuous <Continuous>  dextrose 50% Injectable 25 Gram(s) IV Push once  dextrose 50% Injectable 12.5 Gram(s) IV Push once  dextrose 50% Injectable 25 Gram(s) IV Push once  glucagon  Injectable 1 milliGRAM(s) IntraMuscular once  heparin   Injectable 5000 Unit(s) SubCutaneous every 8 hours  influenza  Vaccine (HIGH DOSE) 0.5 milliLiter(s) IntraMuscular once  insulin lispro (ADMELOG) corrective regimen sliding scale   SubCutaneous three times a day before meals  insulin lispro (ADMELOG) corrective regimen sliding scale   SubCutaneous at bedtime  methylPREDNISolone sodium succinate Injectable 40 milliGRAM(s) IV Push two times a day  NIFEdipine XL 30 milliGRAM(s) Oral daily  polyethylene glycol 3350 17 Gram(s) Oral daily  ranolazine 500 milliGRAM(s) Oral two times a day  sertraline 50 milliGRAM(s) Oral daily  tiotropium 2.5 MICROgram(s)/olodaterol 2.5 MICROgram(s) (STIOLTO) Inhaler 2 Puff(s) Inhalation daily    MEDICATIONS  (PRN):  acetaminophen     Tablet .. 650 milliGRAM(s) Oral every 6 hours PRN Temp greater or equal to 38C (100.4F), Mild Pain (1 - 3)  aluminum hydroxide/magnesium hydroxide/simethicone Suspension 30 milliLiter(s) Oral every 4 hours PRN Dyspepsia  dextrose Oral Gel 15 Gram(s) Oral once PRN Blood Glucose LESS THAN 70 milliGRAM(s)/deciliter  melatonin 3 milliGRAM(s) Oral at bedtime PRN Insomnia  ondansetron Injectable 4 milliGRAM(s) IV Push every 8 hours PRN Nausea and/or Vomiting      ASSESSMENT / PLAN:  ----------------------------------------  74M with a history of COPD, diabetes, hypertension, anxiety, and depression, who was referred to the hospital with complaints of dyspnea and chest discomfort over the past two months and was found to have hypoxia. Supplemental oxygen and intravenous methylprednisolone were initiated as well as azithromycin for possible bronchitis.    Acute hypoxic respiratory failure / COPD  - CT of the chest noted fine fibrotic changes, bronchiectasis/bronchiolectasis, as well as cystic changes  - No pulmonary embolus  - On methylprednisolone taper  - On azithromycin  - Continue on tiotropium/olodaterol  - For further titration of supplemental oxygen as tolerated, for trial off oxygen today, to monitor with ambulation    Chest discomfort  - Suspect muscular in origin  - Troponin level was not elevated  - Echocardiogram was without wall motion abnormalities  - Recent nuclear stress test was negative    Chronic kidney disease III with acute kidney injury  - Monitoring renal function  - No urine retention on bladder scan    Hypertension / Hyperlipidemia  - Close blood pressure monitoring  - Nifedipine  - Continue on aspirin, atorvastatin, and ranolazine    Anxiety / Depression  - On sertraline    Diabetes  - Metformin to be held for now  - Insulin coverage  - Close monitoring of blood glucose levels        Dispo: Anticipate discharge home with supplemental oxygen in 1-2 days pending improvement in hypoxia LIA ÁLVARO  ----------------------------------------  The patient was seen earlier at bedside. Patient with hypoxia. Noted some left chest wall pain reproducible with palpation.    Vital Signs Last 24 Hrs  T(C): 36.8 (16 Dec 2024 11:09), Max: 36.8 (16 Dec 2024 11:09)  T(F): 98.2 (16 Dec 2024 11:09), Max: 98.2 (16 Dec 2024 11:09)  HR: 69 (16 Dec 2024 11:09) (57 - 75)  BP: 135/63 (16 Dec 2024 11:09) (131/76 - 151/80)  BP(mean): 87 (16 Dec 2024 11:09) (87 - 95)  RR: 18 (16 Dec 2024 11:09) (18 - 19)  SpO2: 91% (16 Dec 2024 11:09) (90% - 96%)    Parameters below as of 16 Dec 2024 11:09  Patient On (Oxygen Delivery Method): room air    CAPILLARY BLOOD GLUCOSE  POCT Blood Glucose.: 132 mg/dL (16 Dec 2024 08:26)  POCT Blood Glucose.: 253 mg/dL (15 Dec 2024 22:55)  POCT Blood Glucose.: 235 mg/dL (15 Dec 2024 17:20)  POCT Blood Glucose.: 207 mg/dL (15 Dec 2024 12:31)    PHYSICAL EXAMINATION:  ----------------------------------------  General appearance: No acute distress, Awake, Alert  HEENT: Normocephalic, Atraumatic, Conjunctiva clear, EOMI  Neck: Supple, No JVD, No tenderness  Lungs: Breath sound equal bilaterally, No wheezes, Basilar rales  Cardiovascular: S1S2, Regular rhythm  Abdomen: Soft, Nontender, Nondistended, No guarding/rebound, Positive bowel sounds  Extremities: No clubbing, No cyanosis, No edema, No calf tenderness  Neuro: Strength equal bilaterally, No tremors  Psychiatric: Appropriate mood, Normal affect    LABORATORY STUDIES:  ----------------------------------------             13.9   18.53 )-----------( 282      ( 16 Dec 2024 03:22 )             42.8     12-16    137  |  100  |  46.0[H]  ----------------------------<  218[H]  5.4[H]   |  27.0  |  2.08[H]    Ca    9.9      16 Dec 2024 03:22    Urinalysis Basic - ( 16 Dec 2024 03:22 )  Color: x / Appearance: x / SG: x / pH: x  Gluc: 218 mg/dL / Ketone: x  / Bili: x / Urobili: x   Blood: x / Protein: x / Nitrite: x   Leuk Esterase: x / RBC: x / WBC x   Sq Epi: x / Non Sq Epi: x / Bacteria: x    Culture - Sputum (collected 15 Dec 2024 06:50)  Source: .Sputum Sputum  Gram Stain (15 Dec 2024 19:55):    Rare polymorphonuclear leukocytes per low power field    Rare Squamous epithelial cells per low power field    Moderate Gram Positive Cocci in Pairs and Chains per oil power field    Few Gram Positive Rods per oil power field    Few Gram Negative Rods per oil power field  Preliminary Report (16 Dec 2024 11:50):    Commensal janusz consistent with body site        MEDICATIONS  (STANDING):  albuterol/ipratropium for Nebulization 3 milliLiter(s) Nebulizer every 6 hours  aspirin  chewable 81 milliGRAM(s) Oral daily  atorvastatin 40 milliGRAM(s) Oral at bedtime  azithromycin  IVPB 500 milliGRAM(s) IV Intermittent every 24 hours  azithromycin  IVPB      dextrose 5%. 1000 milliLiter(s) (100 mL/Hr) IV Continuous <Continuous>  dextrose 5%. 1000 milliLiter(s) (50 mL/Hr) IV Continuous <Continuous>  dextrose 50% Injectable 25 Gram(s) IV Push once  dextrose 50% Injectable 12.5 Gram(s) IV Push once  dextrose 50% Injectable 25 Gram(s) IV Push once  glucagon  Injectable 1 milliGRAM(s) IntraMuscular once  heparin   Injectable 5000 Unit(s) SubCutaneous every 8 hours  influenza  Vaccine (HIGH DOSE) 0.5 milliLiter(s) IntraMuscular once  insulin lispro (ADMELOG) corrective regimen sliding scale   SubCutaneous three times a day before meals  insulin lispro (ADMELOG) corrective regimen sliding scale   SubCutaneous at bedtime  methylPREDNISolone sodium succinate Injectable 40 milliGRAM(s) IV Push two times a day  NIFEdipine XL 30 milliGRAM(s) Oral daily  polyethylene glycol 3350 17 Gram(s) Oral daily  ranolazine 500 milliGRAM(s) Oral two times a day  sertraline 50 milliGRAM(s) Oral daily  tiotropium 2.5 MICROgram(s)/olodaterol 2.5 MICROgram(s) (STIOLTO) Inhaler 2 Puff(s) Inhalation daily    MEDICATIONS  (PRN):  acetaminophen     Tablet .. 650 milliGRAM(s) Oral every 6 hours PRN Temp greater or equal to 38C (100.4F), Mild Pain (1 - 3)  aluminum hydroxide/magnesium hydroxide/simethicone Suspension 30 milliLiter(s) Oral every 4 hours PRN Dyspepsia  dextrose Oral Gel 15 Gram(s) Oral once PRN Blood Glucose LESS THAN 70 milliGRAM(s)/deciliter  melatonin 3 milliGRAM(s) Oral at bedtime PRN Insomnia  ondansetron Injectable 4 milliGRAM(s) IV Push every 8 hours PRN Nausea and/or Vomiting      ASSESSMENT / PLAN:  ----------------------------------------  74M with a history of COPD, diabetes, hypertension, anxiety, and depression, who was referred to the hospital with complaints of dyspnea and chest discomfort over the past two months and was found to have hypoxia. Supplemental oxygen and intravenous methylprednisolone were initiated as well as azithromycin for possible bronchitis.    Acute hypoxic respiratory failure / COPD  - CT of the chest noted fine fibrotic changes, bronchiectasis/bronchiolectasis, as well as cystic changes  - No pulmonary embolus  - On methylprednisolone taper  - On azithromycin  - Continue on tiotropium/olodaterol  - For further titration of supplemental oxygen as tolerated, for trial off oxygen today, to monitor with ambulation    Chest discomfort  - Suspect muscular in origin  - Troponin level was not elevated  - Echocardiogram was without wall motion abnormalities  - Recent nuclear stress test was negative  - If persistent, would follow up with Cardiology on an outpatient basis    Chronic kidney disease III with acute kidney injury  - Monitoring renal function  - No urine retention on bladder scan    Hypertension / Hyperlipidemia  - Close blood pressure monitoring  - Nifedipine  - Continue on aspirin, atorvastatin, and ranolazine    Anxiety / Depression  - On sertraline    Diabetes  - Metformin to be held for now  - Insulin coverage  - Close monitoring of blood glucose levels        Dispo: Anticipate discharge home with supplemental oxygen in 1-2 days pending improvement in hypoxia

## 2024-12-16 NOTE — PROGRESS NOTE ADULT - SUBJECTIVE AND OBJECTIVE BOX
PULMONARY PROGRESS NOTE      LIA REAHANNAH-[id number]    Patient is a 74y old  Male who presents with a chief complaint of Dyspnea (14 Dec 2024 17:11)      INTERVAL HPI/OVERNIGHT EVENTS:  alert, nad  still with intermittent chest pain  no sig sputum  using home 02  MEDICATIONS  (STANDING):  albuterol/ipratropium for Nebulization 3 milliLiter(s) Nebulizer every 6 hours  aspirin  chewable 81 milliGRAM(s) Oral daily  atorvastatin 40 milliGRAM(s) Oral at bedtime  azithromycin  IVPB 500 milliGRAM(s) IV Intermittent every 24 hours  azithromycin  IVPB      dextrose 5%. 1000 milliLiter(s) (100 mL/Hr) IV Continuous <Continuous>  dextrose 5%. 1000 milliLiter(s) (50 mL/Hr) IV Continuous <Continuous>  dextrose 50% Injectable 25 Gram(s) IV Push once  dextrose 50% Injectable 12.5 Gram(s) IV Push once  dextrose 50% Injectable 25 Gram(s) IV Push once  glucagon  Injectable 1 milliGRAM(s) IntraMuscular once  heparin   Injectable 5000 Unit(s) SubCutaneous every 8 hours  influenza  Vaccine (HIGH DOSE) 0.5 milliLiter(s) IntraMuscular once  insulin lispro (ADMELOG) corrective regimen sliding scale   SubCutaneous three times a day before meals  insulin lispro (ADMELOG) corrective regimen sliding scale   SubCutaneous at bedtime  NIFEdipine XL 30 milliGRAM(s) Oral daily  polyethylene glycol 3350 17 Gram(s) Oral daily  ranolazine 500 milliGRAM(s) Oral two times a day  sertraline 50 milliGRAM(s) Oral daily  tiotropium 2.5 MICROgram(s)/olodaterol 2.5 MICROgram(s) (STIOLTO) Inhaler 2 Puff(s) Inhalation daily      MEDICATIONS  (PRN):  acetaminophen     Tablet .. 650 milliGRAM(s) Oral every 6 hours PRN Temp greater or equal to 38C (100.4F), Mild Pain (1 - 3)  aluminum hydroxide/magnesium hydroxide/simethicone Suspension 30 milliLiter(s) Oral every 4 hours PRN Dyspepsia  dextrose Oral Gel 15 Gram(s) Oral once PRN Blood Glucose LESS THAN 70 milliGRAM(s)/deciliter  melatonin 3 milliGRAM(s) Oral at bedtime PRN Insomnia  ondansetron Injectable 4 milliGRAM(s) IV Push every 8 hours PRN Nausea and/or Vomiting      Allergies    No Known Allergies    Intolerances        PAST MEDICAL & SURGICAL HISTORY:  NJ (obstructive sleep apnea)  mouthpiece      Diabetes      Obesity      NJ and COPD overlap syndrome      Hypercholesteremia      History of COPD      Diverticulitis      H/O hernia repair      History of appendectomy          SOCIAL HISTORY  Smoking History:       REVIEW OF SYSTEMS:    CONSTITUTIONAL:  No distress    HEENT:  Eyes:  No diplopia or blurred vision. ENT:  No earache, sore throat or runny nose.    CARDIOVASCULAR:  No pressure, squeezing, tightness, heaviness or aching about the chest; no palpitations.    RESPIRATORY: see HPI    GASTROINTESTINAL:  No nausea, vomiting or diarrhea.    GENITOURINARY:  No dysuria, frequency or urgency.    NEUROLOGIC:  No paresthesias, fasciculations, seizures or weakness.    PSYCHIATRIC:  No disorder of thought or mood.    Vital Signs Last 24 Hrs  T(C): 36.8 (16 Dec 2024 11:09), Max: 36.8 (16 Dec 2024 11:09)  T(F): 98.2 (16 Dec 2024 11:09), Max: 98.2 (16 Dec 2024 11:09)  HR: 69 (16 Dec 2024 11:09) (57 - 75)  BP: 135/63 (16 Dec 2024 11:09) (131/76 - 151/80)  BP(mean): 87 (16 Dec 2024 11:09) (87 - 95)  RR: 18 (16 Dec 2024 11:09) (18 - 19)  SpO2: 91% (16 Dec 2024 11:09) (90% - 96%)    Parameters below as of 16 Dec 2024 11:09  Patient On (Oxygen Delivery Method): room air        PHYSICAL EXAMINATION:    GENERAL: The patient is awake and alert in no apparent distress.     HEENT: Head is normocephalic and atraumatic. Extraocular muscles are intact. Mucous membranes are moist.    NECK: Supple.    LUNGS: moderate air entry with insp crackles bases; respirations unlabored    HEART: Regular rate and rhythm without murmur.    ABDOMEN: Soft, nontender, and nondistended.      EXTREMITIES: Without any cyanosis, clubbing, rash, lesions or edema.    NEUROLOGIC: Grossly intact.    LABS:                        13.9   18.53 )-----------( 282      ( 16 Dec 2024 03:22 )             42.8     12-16    137  |  100  |  46.0[H]  ----------------------------<  218[H]  5.4[H]   |  27.0  |  2.08[H]    Ca    9.9      16 Dec 2024 03:22        Urinalysis Basic - ( 16 Dec 2024 03:22 )    Color: x / Appearance: x / SG: x / pH: x  Gluc: 218 mg/dL / Ketone: x  / Bili: x / Urobili: x   Blood: x / Protein: x / Nitrite: x   Leuk Esterase: x / RBC: x / WBC x   Sq Epi: x / Non Sq Epi: x / Bacteria: x                      MICROBIOLOGY:    RADIOLOGY & ADDITIONAL STUDIES:  CTA reviewed, office notes reviewed

## 2024-12-16 NOTE — DISCHARGE NOTE NURSING/CASE MANAGEMENT/SOCIAL WORK - FINANCIAL ASSISTANCE
[facility] provides services at a reduced cost to those who are determined to be eligible through [facility]’s financial assistance program. Information regarding [facility]’s financial assistance program can be found by going to [email or web address] or by calling [phone].

## 2024-12-16 NOTE — PROGRESS NOTE ADULT - ASSESSMENT
Acute hypoxemic resp failure   ILD - Fibrotic  NSIP vs UIP ( failed perfendione out pt) appears stable on CT  COPD/NJ  over lap  NO large PE on CTA, Duplex negative  ECHO normal LV/RV on PH  Moderate NJ ( AHI 23, severe in REM) intolerant CPAP    Clinically improved with home 02  change prednisone with  taper- D/C soon  Will discuss cardiology eval for recurrent chest pain with  medical team   Acute hypoxemic resp failure   ILD - Fibrotic  NSIP vs UIP ( failed perfendione out pt) appears stable on CT  COPD/NJ  over lap  NO large PE on CTA, Duplex negative  ECHO normal LV/RV on PH  Moderate NJ ( AHI 23, severe in REM) intolerant CPAP    Clinically improved with home 02  change prednisone with  taper- D/C soon  Will discuss cardiology eval for recurrent chest pain with  medical team, although prior w/u negative  Wife called, no answer   Acute hypoxemic resp failure   ILD - Fibrotic  NSIP vs UIP ( failed perfendione out pt) appears stable on CT  COPD/NJ  over lap  NO large PE on CTA, Duplex negative  ECHO normal LV/RV on PH  Moderate NJ ( AHI 23, severe in REM) intolerant CPAP    Clinically improved with home 02  change prednisone with  taper- D/C soon  Will discuss cardiology eval for recurrent chest pain with  medical team, although prior w/u negative  Wife called , case reviewed  needs home nebulizer

## 2024-12-16 NOTE — DISCHARGE NOTE NURSING/CASE MANAGEMENT/SOCIAL WORK - NSDCPEFALRISK_GEN_ALL_CORE
For information on Fall & Injury Prevention, visit: [email or web address] OR  [email or web address] OR  https://www.cdc.gov/steadi/patient.html

## 2024-12-16 NOTE — DISCHARGE NOTE NURSING/CASE MANAGEMENT/SOCIAL WORK - PATIENT PORTAL LINK FT
You can access the FollowMyHealth Patient Portal offered by [facility] by registering at the following website: [email or web address]. By joining [employer]’s FollowMyHealth portal, you will also be able to view your health information using other applications (apps) compatible with our system.

## 2024-12-17 ENCOUNTER — TRANSCRIPTION ENCOUNTER (OUTPATIENT)
Age: 74
End: 2024-12-17

## 2024-12-17 VITALS
RESPIRATION RATE: 20 BRPM | TEMPERATURE: 98 F | HEART RATE: 72 BPM | OXYGEN SATURATION: 95 % | DIASTOLIC BLOOD PRESSURE: 70 MMHG | SYSTOLIC BLOOD PRESSURE: 138 MMHG

## 2024-12-17 LAB
ANION GAP SERPL CALC-SCNC: 9 MMOL/L — SIGNIFICANT CHANGE UP (ref 5–17)
BUN SERPL-MCNC: 46.6 MG/DL — HIGH (ref 8–20)
CALCIUM SERPL-MCNC: 9.5 MG/DL — SIGNIFICANT CHANGE UP (ref 8.4–10.5)
CHLORIDE SERPL-SCNC: 98 MMOL/L — SIGNIFICANT CHANGE UP (ref 96–108)
CO2 SERPL-SCNC: 28 MMOL/L — SIGNIFICANT CHANGE UP (ref 22–29)
CREAT SERPL-MCNC: 2.01 MG/DL — HIGH (ref 0.5–1.3)
EGFR: 34 ML/MIN/1.73M2 — LOW
GLUCOSE BLDC GLUCOMTR-MCNC: 163 MG/DL — HIGH (ref 70–99)
GLUCOSE BLDC GLUCOMTR-MCNC: 280 MG/DL — HIGH (ref 70–99)
GLUCOSE SERPL-MCNC: 211 MG/DL — HIGH (ref 70–99)
HCT VFR BLD CALC: 42.5 % — SIGNIFICANT CHANGE UP (ref 39–50)
HGB BLD-MCNC: 13.9 G/DL — SIGNIFICANT CHANGE UP (ref 13–17)
MCHC RBC-ENTMCNC: 30.2 PG — SIGNIFICANT CHANGE UP (ref 27–34)
MCHC RBC-ENTMCNC: 32.7 G/DL — SIGNIFICANT CHANGE UP (ref 32–36)
MCV RBC AUTO: 92.2 FL — SIGNIFICANT CHANGE UP (ref 80–100)
PLATELET # BLD AUTO: 268 K/UL — SIGNIFICANT CHANGE UP (ref 150–400)
POTASSIUM SERPL-MCNC: 4.8 MMOL/L — SIGNIFICANT CHANGE UP (ref 3.5–5.3)
POTASSIUM SERPL-SCNC: 4.8 MMOL/L — SIGNIFICANT CHANGE UP (ref 3.5–5.3)
RBC # BLD: 4.61 M/UL — SIGNIFICANT CHANGE UP (ref 4.2–5.8)
RBC # FLD: 14.3 % — SIGNIFICANT CHANGE UP (ref 10.3–14.5)
SODIUM SERPL-SCNC: 135 MMOL/L — SIGNIFICANT CHANGE UP (ref 135–145)
WBC # BLD: 18.42 K/UL — HIGH (ref 3.8–10.5)
WBC # FLD AUTO: 18.42 K/UL — HIGH (ref 3.8–10.5)

## 2024-12-17 PROCEDURE — 87205 SMEAR GRAM STAIN: CPT

## 2024-12-17 PROCEDURE — 87637 SARSCOV2&INF A&B&RSV AMP PRB: CPT

## 2024-12-17 PROCEDURE — 82803 BLOOD GASES ANY COMBINATION: CPT

## 2024-12-17 PROCEDURE — 82962 GLUCOSE BLOOD TEST: CPT

## 2024-12-17 PROCEDURE — 85730 THROMBOPLASTIN TIME PARTIAL: CPT

## 2024-12-17 PROCEDURE — 93970 EXTREMITY STUDY: CPT

## 2024-12-17 PROCEDURE — 71275 CT ANGIOGRAPHY CHEST: CPT | Mod: MC

## 2024-12-17 PROCEDURE — 93005 ELECTROCARDIOGRAM TRACING: CPT

## 2024-12-17 PROCEDURE — 99239 HOSP IP/OBS DSCHRG MGMT >30: CPT

## 2024-12-17 PROCEDURE — 80053 COMPREHEN METABOLIC PANEL: CPT

## 2024-12-17 PROCEDURE — 84484 ASSAY OF TROPONIN QUANT: CPT

## 2024-12-17 PROCEDURE — 71045 X-RAY EXAM CHEST 1 VIEW: CPT

## 2024-12-17 PROCEDURE — 99233 SBSQ HOSP IP/OBS HIGH 50: CPT

## 2024-12-17 PROCEDURE — 99285 EMERGENCY DEPT VISIT HI MDM: CPT | Mod: 25

## 2024-12-17 PROCEDURE — 80048 BASIC METABOLIC PNL TOTAL CA: CPT

## 2024-12-17 PROCEDURE — 85610 PROTHROMBIN TIME: CPT

## 2024-12-17 PROCEDURE — 84145 PROCALCITONIN (PCT): CPT

## 2024-12-17 PROCEDURE — 0241U: CPT

## 2024-12-17 PROCEDURE — 96374 THER/PROPH/DIAG INJ IV PUSH: CPT

## 2024-12-17 PROCEDURE — 93306 TTE W/DOPPLER COMPLETE: CPT

## 2024-12-17 PROCEDURE — 85027 COMPLETE CBC AUTOMATED: CPT

## 2024-12-17 PROCEDURE — 0225U NFCT DS DNA&RNA 21 SARSCOV2: CPT

## 2024-12-17 PROCEDURE — 36415 COLL VENOUS BLD VENIPUNCTURE: CPT

## 2024-12-17 PROCEDURE — 87070 CULTURE OTHR SPECIMN AEROBIC: CPT

## 2024-12-17 PROCEDURE — 94640 AIRWAY INHALATION TREATMENT: CPT

## 2024-12-17 PROCEDURE — 85025 COMPLETE CBC W/AUTO DIFF WBC: CPT

## 2024-12-17 RX ORDER — IPRATROPIUM BROMIDE AND ALBUTEROL SULFATE 2.5; .5 MG/3ML; MG/3ML
3 SOLUTION RESPIRATORY (INHALATION)
Qty: 360 | Refills: 0
Start: 2024-12-17 | End: 2025-01-15

## 2024-12-17 RX ORDER — PREDNISONE 20 MG/1
1 TABLET ORAL
Qty: 30 | Refills: 0
Start: 2024-12-17 | End: 2025-01-15

## 2024-12-17 RX ADMIN — AZITHROMYCIN 255 MILLIGRAM(S): 250 TABLET, FILM COATED ORAL at 09:02

## 2024-12-17 RX ADMIN — SERTRALINE HYDROCHLORIDE 50 MILLIGRAM(S): 100 TABLET, FILM COATED ORAL at 09:02

## 2024-12-17 RX ADMIN — IPRATROPIUM BROMIDE AND ALBUTEROL SULFATE 3 MILLILITER(S): 2.5; .5 SOLUTION RESPIRATORY (INHALATION) at 02:23

## 2024-12-17 RX ADMIN — Medication 1: at 09:03

## 2024-12-17 RX ADMIN — Medication 30 MILLIGRAM(S): at 09:02

## 2024-12-17 RX ADMIN — Medication 81 MILLIGRAM(S): at 09:02

## 2024-12-17 RX ADMIN — Medication 3: at 12:54

## 2024-12-17 RX ADMIN — PREDNISONE 40 MILLIGRAM(S): 20 TABLET ORAL at 06:05

## 2024-12-17 RX ADMIN — Medication 5000 UNIT(S): at 06:05

## 2024-12-17 RX ADMIN — RANOLAZINE 500 MILLIGRAM(S): 1000 TABLET, FILM COATED, EXTENDED RELEASE ORAL at 06:05

## 2024-12-17 RX ADMIN — IPRATROPIUM BROMIDE AND ALBUTEROL SULFATE 3 MILLILITER(S): 2.5; .5 SOLUTION RESPIRATORY (INHALATION) at 09:00

## 2024-12-17 RX ADMIN — Medication 5000 UNIT(S): at 12:54

## 2024-12-17 RX ADMIN — TIOTROPIUM BROMIDE AND OLODATEROL 2 PUFF(S): 3.124; 2.736 SPRAY, METERED RESPIRATORY (INHALATION) at 09:01

## 2024-12-17 NOTE — PROGRESS NOTE ADULT - SUBJECTIVE AND OBJECTIVE BOX
LIA ÁLVARO  ----------------------------------------  The patient was seen earlier at bedside. Patient with hypoxia. Offered no complaints. Denied chest pain or palpitations.    Vital Signs Last 24 Hrs  T(C): 36.9 (17 Dec 2024 07:53), Max: 37.1 (16 Dec 2024 19:28)  T(F): 98.4 (17 Dec 2024 07:53), Max: 98.7 (16 Dec 2024 19:28)  HR: 68 (17 Dec 2024 09:09) (59 - 73)  BP: 115/69 (17 Dec 2024 07:53) (115/69 - 160/71)  BP(mean): 84 (17 Dec 2024 07:53) (84 - 94)  RR: 18 (17 Dec 2024 07:53) (18 - 20)  SpO2: 96% (17 Dec 2024 09:09) (90% - 96%)    Parameters below as of 17 Dec 2024 09:09  Patient On (Oxygen Delivery Method): nasal cannula, 2L    CAPILLARY BLOOD GLUCOSE  POCT Blood Glucose.: 163 mg/dL (17 Dec 2024 08:54)  POCT Blood Glucose.: 221 mg/dL (16 Dec 2024 21:54)  POCT Blood Glucose.: 176 mg/dL (16 Dec 2024 17:08)  POCT Blood Glucose.: 269 mg/dL (16 Dec 2024 12:39)    PHYSICAL EXAMINATION:  ----------------------------------------  General appearance: No acute distress, Awake, Alert  HEENT: Normocephalic, Atraumatic, Conjunctiva clear, EOMI  Neck: Supple, No JVD, No tenderness  Lungs: Breath sound equal bilaterally, No wheezes, Basilar rales  Cardiovascular: S1S2, Regular rhythm  Abdomen: Soft, Nontender, Nondistended, No guarding/rebound, Positive bowel sounds  Extremities: No clubbing, No cyanosis, No edema, No calf tenderness  Neuro: Strength equal bilaterally, No tremors  Psychiatric: Appropriate mood, Normal affect    LABORATORY STUDIES:  ----------------------------------------             13.9   18.42 )-----------( 268      ( 17 Dec 2024 04:15 )             42.5     12-17    135  |  98  |  46.6[H]  ----------------------------<  211[H]  4.8   |  28.0  |  2.01[H]    Ca    9.5      17 Dec 2024 04:15    Urinalysis Basic - ( 17 Dec 2024 04:15 )  Color: x / Appearance: x / SG: x / pH: x  Gluc: 211 mg/dL / Ketone: x  / Bili: x / Urobili: x   Blood: x / Protein: x / Nitrite: x   Leuk Esterase: x / RBC: x / WBC x   Sq Epi: x / Non Sq Epi: x / Bacteria: x    Culture - Sputum (collected 15 Dec 2024 06:50)  Source: .Sputum Sputum  Gram Stain (15 Dec 2024 19:55):    Rare polymorphonuclear leukocytes per low power field    Rare Squamous epithelial cells per low power field    Moderate Gram Positive Cocci in Pairs and Chains per oil power field    Few Gram Positive Rods per oil power field    Few Gram Negative Rods per oil power field  Final Report (16 Dec 2024 20:48):    Commensal janusz consistent with body site    MEDICATIONS  (STANDING):  albuterol/ipratropium for Nebulization 3 milliLiter(s) Nebulizer every 6 hours  aspirin  chewable 81 milliGRAM(s) Oral daily  atorvastatin 40 milliGRAM(s) Oral at bedtime  azithromycin  IVPB 500 milliGRAM(s) IV Intermittent every 24 hours  azithromycin  IVPB      dextrose 5%. 1000 milliLiter(s) (100 mL/Hr) IV Continuous <Continuous>  dextrose 5%. 1000 milliLiter(s) (50 mL/Hr) IV Continuous <Continuous>  dextrose 50% Injectable 25 Gram(s) IV Push once  dextrose 50% Injectable 12.5 Gram(s) IV Push once  dextrose 50% Injectable 25 Gram(s) IV Push once  glucagon  Injectable 1 milliGRAM(s) IntraMuscular once  heparin   Injectable 5000 Unit(s) SubCutaneous every 8 hours  influenza  Vaccine (HIGH DOSE) 0.5 milliLiter(s) IntraMuscular once  insulin lispro (ADMELOG) corrective regimen sliding scale   SubCutaneous three times a day before meals  insulin lispro (ADMELOG) corrective regimen sliding scale   SubCutaneous at bedtime  NIFEdipine XL 30 milliGRAM(s) Oral daily  polyethylene glycol 3350 17 Gram(s) Oral daily  predniSONE   Tablet 40 milliGRAM(s) Oral daily  ranolazine 500 milliGRAM(s) Oral two times a day  sertraline 50 milliGRAM(s) Oral daily  tiotropium 2.5 MICROgram(s)/olodaterol 2.5 MICROgram(s) (STIOLTO) Inhaler 2 Puff(s) Inhalation daily    MEDICATIONS  (PRN):  acetaminophen     Tablet .. 650 milliGRAM(s) Oral every 6 hours PRN Temp greater or equal to 38C (100.4F), Mild Pain (1 - 3)  aluminum hydroxide/magnesium hydroxide/simethicone Suspension 30 milliLiter(s) Oral every 4 hours PRN Dyspepsia  dextrose Oral Gel 15 Gram(s) Oral once PRN Blood Glucose LESS THAN 70 milliGRAM(s)/deciliter  melatonin 3 milliGRAM(s) Oral at bedtime PRN Insomnia  ondansetron Injectable 4 milliGRAM(s) IV Push every 8 hours PRN Nausea and/or Vomiting      ASSESSMENT / PLAN:  ----------------------------------------  74M with a history of COPD, diabetes, hypertension, anxiety, and depression, who was referred to the hospital with complaints of dyspnea and chest discomfort over the past two months and was found to have hypoxia. Supplemental oxygen and intravenous methylprednisolone were initiated as well as azithromycin for possible bronchitis.    Acute hypoxic respiratory failure / COPD  - CT of the chest noted fine fibrotic changes, bronchiectasis/bronchiolectasis, as well as cystic changes  - No pulmonary embolus  - Transitioned from methylprednisolone to prednisone  - On azithromycin  - Continue on tiotropium/olodaterol  - For further titration of supplemental oxygen as tolerated, for trial off oxygen today, to monitor with ambulation    Chest discomfort  - Suspect muscular in origin  - Troponin level was not elevated  - Echocardiogram was without wall motion abnormalities  - Recent nuclear stress test was negative  - Patient recently evaluated by his cardiologist, to follow up if symptoms persists    Chronic kidney disease III with acute kidney injury  - Monitoring renal function  - No urine retention on bladder scan    Hypertension / Hyperlipidemia  - Close blood pressure monitoring  - Nifedipine  - Continue on aspirin, atorvastatin, and ranolazine    Anxiety / Depression  - On sertraline    Diabetes  - Metformin was held  - Insulin coverage  - Close monitoring of blood glucose levels    Dispo: For discharge home on supplemental oxygen to follow up with his pulmonologist for further management

## 2024-12-17 NOTE — PROGRESS NOTE ADULT - TIME BILLING
greater than 50% of time spent reviewing labs, notes, orders and radiographs, coordinating care  discussed with pt, med team, wife called
greater than 50% of time spent reviewing labs, notes, orders and radiographs, coordinating care  discussed with pt, ER
review of chart notes, labs, bedside assessment and documentation
review of chart notes, labs, bedside assessment, imaging, discussion with Dr. Farias and documentation
review of chart notes, labs, imaging, bedside assessment, discussion with Dr. Farias and documentation

## 2024-12-17 NOTE — DISCHARGE NOTE PROVIDER - HOSPITAL COURSE
74M referred to the hospital from the pulmonologist office with hypoxia. The patient reported two month history of dyspnea and chest discomfort. On presentation,   CT angiogram of the chest was without pulmonary embolism, redemonstration of architectural distortion of the lung parenchyma, subpleural reticulations and fine fibrotic changes in all 5 lobes, bronchiectasis/bronchiolectasis, and peripheral cystic changes in the lower lobes. Lower extremity doppler was without deep vein thrombosis. The patient was evaluated by Pulmonary and continued on intravenous methylprednisolone. Echocardiogram noted mildly increased left ventricular wall thickness, hyperdynamic left ventricular systolic function with an ejection fraction of 71%, indeterminate left ventricular diastolic function with elevated left ventricular filling pressure. The patient had persistent coughing and azithromycin was initiated for possible bronchitis. The patient had improvement in the hypoxia and the supplemental oxygen was further titrated. The patient was discharged with a prednisone taper and supplemental oxygen.        36 minutes total time

## 2024-12-17 NOTE — DISCHARGE NOTE PROVIDER - NSDCFUSCHEDAPPT_GEN_ALL_CORE_FT
Josephine, Cody  [facility]  [address]  Scheduled Appointment: 01/20/2025

## 2024-12-17 NOTE — PROGRESS NOTE ADULT - ASSESSMENT
Acute hypoxemic resp failure   ILD - Fibrotic  NSIP vs UIP ( failed perfendione out pt) appears stable on CT  COPD/NJ  over lap  NO large PE on CTA, Duplex negative  ECHO normal LV/RV on PH  Moderate NJ ( AHI 23, severe in REM) intolerant CPAP    Clinically improved with home 02  D/C on  prolonged low dose prednisone   taper  Reebt cardiaco work up negative  Wife called , case reviewed  needs home nebulizer   Acute hypoxemic resp failure   ILD - Fibrotic  NSIP vs UIP ( failed perfendione out pt) appears stable on CT  COPD/NJ  over lap  NO large PE on CTA, Duplex negative  ECHO normal LV/RV no PH  Moderate NJ ( AHI 23, severe in REM) intolerant CPAP    Clinically improved with home 02  D/C on  prolonged low dose prednisone   taper and 02  Recent cardiaco work up negative  intolerant CPAP, will use oral appliance   home nebulizer  Pulmonary follow up out pt

## 2024-12-17 NOTE — PROGRESS NOTE ADULT - SUBJECTIVE AND OBJECTIVE BOX
PULMONARY PROGRESS NOTE      [id number]    Patient is a 74y old  Male who presents with a chief complaint of Dyspnea (14 Dec 2024 17:11)      INTERVAL HPI/OVERNIGHT EVENTS:  no overnight issues  no CP or SOB  oxygenating well on cannula  MEDICATIONS  (STANDING):  albuterol/ipratropium for Nebulization 3 milliLiter(s) Nebulizer every 6 hours  aspirin  chewable 81 milliGRAM(s) Oral daily  atorvastatin 40 milliGRAM(s) Oral at bedtime  azithromycin  IVPB 500 milliGRAM(s) IV Intermittent every 24 hours  azithromycin  IVPB      dextrose 5%. 1000 milliLiter(s) (100 mL/Hr) IV Continuous <Continuous>  dextrose 5%. 1000 milliLiter(s) (50 mL/Hr) IV Continuous <Continuous>  dextrose 50% Injectable 25 Gram(s) IV Push once  dextrose 50% Injectable 12.5 Gram(s) IV Push once  dextrose 50% Injectable 25 Gram(s) IV Push once  glucagon  Injectable 1 milliGRAM(s) IntraMuscular once  heparin   Injectable 5000 Unit(s) SubCutaneous every 8 hours  influenza  Vaccine (HIGH DOSE) 0.5 milliLiter(s) IntraMuscular once  insulin lispro (ADMELOG) corrective regimen sliding scale   SubCutaneous three times a day before meals  insulin lispro (ADMELOG) corrective regimen sliding scale   SubCutaneous at bedtime  NIFEdipine XL 30 milliGRAM(s) Oral daily  polyethylene glycol 3350 17 Gram(s) Oral daily  predniSONE   Tablet 40 milliGRAM(s) Oral daily  ranolazine 500 milliGRAM(s) Oral two times a day  sertraline 50 milliGRAM(s) Oral daily  tiotropium 2.5 MICROgram(s)/olodaterol 2.5 MICROgram(s) (STIOLTO) Inhaler 2 Puff(s) Inhalation daily      MEDICATIONS  (PRN):  acetaminophen     Tablet .. 650 milliGRAM(s) Oral every 6 hours PRN Temp greater or equal to 38C (100.4F), Mild Pain (1 - 3)  aluminum hydroxide/magnesium hydroxide/simethicone Suspension 30 milliLiter(s) Oral every 4 hours PRN Dyspepsia  dextrose Oral Gel 15 Gram(s) Oral once PRN Blood Glucose LESS THAN 70 milliGRAM(s)/deciliter  melatonin 3 milliGRAM(s) Oral at bedtime PRN Insomnia  ondansetron Injectable 4 milliGRAM(s) IV Push every 8 hours PRN Nausea and/or Vomiting      Allergies    No Known Allergies    Intolerances        PAST MEDICAL & SURGICAL HISTORY:  NJ (obstructive sleep apnea)  mouthpiece      Diabetes      Obesity      NJ and COPD overlap syndrome      Hypercholesteremia      History of COPD      Diverticulitis      H/O hernia repair      History of appendectomy          SOCIAL HISTORY  Smoking History:       REVIEW OF SYSTEMS:    CONSTITUTIONAL:  No distress    HEENT:  Eyes:  No diplopia or blurred vision. ENT:  No earache, sore throat or runny nose.    CARDIOVASCULAR:  No pressure, squeezing, tightness, heaviness or aching about the chest; no palpitations.    RESPIRATORY: see HPI  GASTROINTESTINAL:  No nausea, vomiting or diarrhea.    GENITOURINARY:  No dysuria, frequency or urgency.    NEUROLOGIC:  No paresthesias, fasciculations, seizures or weakness.    PSYCHIATRIC:  No disorder of thought or mood.    Vital Signs Last 24 Hrs  T(C): 36.9 (17 Dec 2024 07:53), Max: 37.1 (16 Dec 2024 19:28)  T(F): 98.4 (17 Dec 2024 07:53), Max: 98.7 (16 Dec 2024 19:28)  HR: 68 (17 Dec 2024 09:09) (59 - 73)  BP: 115/69 (17 Dec 2024 07:53) (115/69 - 160/71)  BP(mean): 84 (17 Dec 2024 07:53) (84 - 94)  RR: 18 (17 Dec 2024 07:53) (18 - 20)  SpO2: 96% (17 Dec 2024 09:09) (91% - 96%)    Parameters below as of 17 Dec 2024 09:09  Patient On (Oxygen Delivery Method): nasal cannula, 2L        PHYSICAL EXAMINATION:    GENERAL: The patient is awake and alert in no apparent distress.     HEENT: Head is normocephalic and atraumatic. Extraocular muscles are intact. Mucous membranes are moist.    NECK: Supple.    LUNGS: mod air entry bilat  without wheezing, rales or rhonchi; respirations unlabored    HEART: Regular rate and rhythm without murmur.    ABDOMEN: Soft, nontender, and nondistended.      EXTREMITIES: Without any cyanosis, clubbing, rash, lesions or edema.    NEUROLOGIC: Grossly intact.    LABS:                        13.9   18.42 )-----------( 268      ( 17 Dec 2024 04:15 )             42.5     12-17    135  |  98  |  46.6[H]  ----------------------------<  211[H]  4.8   |  28.0  |  2.01[H]    Ca    9.5      17 Dec 2024 04:15        Urinalysis Basic - ( 17 Dec 2024 04:15 )    Color: x / Appearance: x / SG: x / pH: x  Gluc: 211 mg/dL / Ketone: x  / Bili: x / Urobili: x   Blood: x / Protein: x / Nitrite: x   Leuk Esterase: x / RBC: x / WBC x   Sq Epi: x / Non Sq Epi: x / Bacteria: x                      MICROBIOLOGY:    RADIOLOGY & ADDITIONAL STUDIES:

## 2024-12-17 NOTE — PROGRESS NOTE ADULT - PROVIDER SPECIALTY LIST ADULT
Hospitalist
Pulmonology

## 2024-12-17 NOTE — DISCHARGE NOTE PROVIDER - PROVIDER TOKENS
PROVIDER:[TOKEN:[[id number]],SCHEDULEDAPPT:[01/20/2025]],PROVIDER:[TOKEN:[[id number]],FOLLOWUP:[1 week]],PROVIDER:[TOKEN:[[id number]]]

## 2024-12-17 NOTE — DISCHARGE NOTE PROVIDER - CARE PROVIDER_API CALL
Cody Burton.  Family Medicine  [address], [address]  [place] [address]  Phone: [phone]  Fax: [phone]  Scheduled Appointment: 01/20/2025    Fede Wilkinson  Pulmonary Disease  [address], [address]  [place] [address]  Phone: [phone]  Fax: [phone]  Follow Up Time: 1 week    Autumn Villalta  Cardiovascular Disease  [address] [place] [id number]  Phone: [phone]  Fax: [phone]  Follow Up Time:

## 2024-12-17 NOTE — DISCHARGE NOTE PROVIDER - NSDCMRMEDTOKEN_GEN_ALL_CORE_FT
aspirin 81 mg oral tablet, chewable: 1 tab(s) orally once a day  atorvastatin 40 mg oral tablet: 1 tab(s) orally once a day (at bedtime)  ipratropium-albuterol 0.5 mg-2.5 mg/3 mL inhalation solution: 3 milliliter(s) inhaled every 6 hours  metFORMIN 500 mg oral tablet: 1 tab(s) orally 2 times a day  NIFEdipine (Eqv-Procardia XL) 30 mg oral tablet, extended release: 1 tab(s) orally once a day  predniSONE 10 mg oral tablet: 1 tab(s) orally once a day 4 tabs PO daily x 3 days, 3 tabs PO daily x 3 days, 2 tabs PO daily x 3 days, 1 tab PO daily x 3 days  ranolazine 500 mg oral tablet, extended release: 1 tab(s) orally 2 times a day  sertraline 50 mg oral tablet: 1 tab(s) orally once a day  Stiolto Respimat 10 ACT 2.5 mcg-2.5 mcg/inh inhalation aerosol: 2 puff(s) inhaled once a day

## 2024-12-17 NOTE — DISCHARGE NOTE PROVIDER - NSDCCPCAREPLAN_GEN_ALL_CORE_FT
PRINCIPAL DISCHARGE DIAGNOSIS  Diagnosis: Acute hypoxic respiratory failure  Assessment and Plan of Treatment: Continue on supplemental oxygen. Continue on the prednisone taper and the use of the nebulizer. Follow up with your pulmonologist for further management.      SECONDARY DISCHARGE DIAGNOSES  Diagnosis: Diabetes  Assessment and Plan of Treatment: Continue on your home diabetes medications. Monitor glucose levels.    Diagnosis: Anxiety  Assessment and Plan of Treatment: Continue on sertraline     PRINCIPAL DISCHARGE DIAGNOSIS  Diagnosis: Acute hypoxic respiratory failure  Assessment and Plan of Treatment: Continue on supplemental oxygen. Continue on the prednisone taper and the use of the nebulizer. Follow up with your pulmonologist for further management.      SECONDARY DISCHARGE DIAGNOSES  Diagnosis: Diabetes  Assessment and Plan of Treatment: Continue on your home diabetes medications. Monitor glucose levels.    Diagnosis: Anxiety  Assessment and Plan of Treatment: Continue on sertraline    Diagnosis: Chest pain  Assessment and Plan of Treatment: Follow up with your cardiologist for further evaluation.

## 2024-12-17 NOTE — DISCHARGE NOTE PROVIDER - CARE PROVIDERS DIRECT ADDRESSES
,[email or web address],DirectAddress_Unknown

## 2024-12-18 ENCOUNTER — NON-APPOINTMENT (OUTPATIENT)
Age: 74
End: 2024-12-18

## 2024-12-18 NOTE — CDI QUERY NOTE - NSCDIOTHERTXTBX_GEN_ALL_CORE_HH
Clinical documentation and/or evidence of the patient’s presentation, evaluation, and medical management, as evidenced below, may support a diagnosis that is not documented in the medical record.  In order to ensure accurate coding and accuracy of the clinical record, the documentation in this patient’s medical record requires additional clarification.      If you think the supporting documentation and/or clinical evidence supports a more specific diagnosis, please include more specific documentation of a diagnosis associated with these findings in your progress note and/or discharge summary.      Please clarify the etiology of respiratory failure  - Acute hypoxic respiratory failure secondary to Interstitial lung disease (ILD) flare   - Acute hypoxic respiratory failure secondary to bronchitis  - Acute hypoxic respiratory failure secondary to COPD exacerbation  -Other, please specify    Supporting documentation and/or clinical evidence:       Progress Note Adult-Pulmonology Attending [Charted Location: [facility]] [Authored: 14-Dec-2024 17:11]  Assessment and Plan:  sent from pulmonary office due to progressive dyspnea over the last few weeks found to have hypoxic respiratory failure in the setting of ILD flare     Acute hypoxic respiratory failure   likely secondary to ILD flare   c/w solumedrol 40 BID and wean in AM if continued improvement    c/w Stiolto   albuterol prn           Progress Note Adult-Hospitalist Attending [Charted Location: [facility]] [Authored: 17-Dec-2024 09:50]  ASSESSMENT / PLAN:  ----------------------------------------  74M with a history of COPD, diabetes, hypertension, anxiety, and depression, who was referred to the hospital with complaints of dyspnea and chest discomfort over the past two months and was found to have hypoxia. Supplemental oxygen and intravenous methylprednisolone were initiated as well as azithromycin for possible bronchitis.    Acute hypoxic respiratory failure / COPD  - CT of the chest noted fine fibrotic changes, bronchiectasis/bronchiolectasis, as well as cystic changes  - No pulmonary embolus  - Transitioned from methylprednisolone to prednisone  - On azithromycin  - Continue on tiotropium/olodaterol  - For further titration of supplemental oxygen as tolerated, for trial off oxygen today, to monitor with ambulation

## 2024-12-23 ENCOUNTER — EMERGENCY (EMERGENCY)
Facility: HOSPITAL | Age: 74
LOS: 1 days | Discharge: HOME | End: 2024-12-23
Attending: STUDENT IN AN ORGANIZED HEALTH CARE EDUCATION/TRAINING PROGRAM
Payer: MEDICARE

## 2024-12-23 ENCOUNTER — NON-APPOINTMENT (OUTPATIENT)
Age: 74
End: 2024-12-23

## 2024-12-23 VITALS
SYSTOLIC BLOOD PRESSURE: 147 MMHG | DIASTOLIC BLOOD PRESSURE: 79 MMHG | HEART RATE: 79 BPM | RESPIRATION RATE: 16 BRPM | OXYGEN SATURATION: 94 %

## 2024-12-23 VITALS
SYSTOLIC BLOOD PRESSURE: 145 MMHG | DIASTOLIC BLOOD PRESSURE: 72 MMHG | TEMPERATURE: 99 F | WEIGHT: 240.3 LBS | RESPIRATION RATE: 17 BRPM | HEART RATE: 83 BPM | OXYGEN SATURATION: 91 %

## 2024-12-23 DIAGNOSIS — Z98.890 OTHER SPECIFIED POSTPROCEDURAL STATES: Chronic | ICD-10-CM

## 2024-12-23 DIAGNOSIS — Z90.49 ACQUIRED ABSENCE OF OTHER SPECIFIED PARTS OF DIGESTIVE TRACT: Chronic | ICD-10-CM

## 2024-12-23 LAB
ALBUMIN SERPL ELPH-MCNC: 3.8 G/DL — SIGNIFICANT CHANGE UP (ref 3.3–5.2)
ALP SERPL-CCNC: 86 U/L — SIGNIFICANT CHANGE UP (ref 40–120)
ALT FLD-CCNC: 26 U/L — SIGNIFICANT CHANGE UP
ANION GAP SERPL CALC-SCNC: 12 MMOL/L — SIGNIFICANT CHANGE UP (ref 5–17)
APTT BLD: 24 SEC — LOW (ref 24.5–35.6)
AST SERPL-CCNC: 16 U/L — SIGNIFICANT CHANGE UP
B PERT DNA SPEC QL NAA+PROBE: SIGNIFICANT CHANGE UP
BASOPHILS # BLD AUTO: 0 K/UL — SIGNIFICANT CHANGE UP (ref 0–0.2)
BASOPHILS NFR BLD AUTO: 0 % — SIGNIFICANT CHANGE UP (ref 0–2)
BILIRUB SERPL-MCNC: 0.5 MG/DL — SIGNIFICANT CHANGE UP (ref 0.4–2)
BUN SERPL-MCNC: 36.2 MG/DL — HIGH (ref 8–20)
C PNEUM DNA SPEC QL NAA+PROBE: SIGNIFICANT CHANGE UP
CALCIUM SERPL-MCNC: 9.9 MG/DL — SIGNIFICANT CHANGE UP (ref 8.4–10.5)
CHLORIDE SERPL-SCNC: 100 MMOL/L — SIGNIFICANT CHANGE UP (ref 96–108)
CO2 SERPL-SCNC: 26 MMOL/L — SIGNIFICANT CHANGE UP (ref 22–29)
CREAT SERPL-MCNC: 2.04 MG/DL — HIGH (ref 0.5–1.3)
EGFR: 33 ML/MIN/1.73M2 — LOW
EOSINOPHIL # BLD AUTO: 0 K/UL — SIGNIFICANT CHANGE UP (ref 0–0.5)
EOSINOPHIL NFR BLD AUTO: 0 % — SIGNIFICANT CHANGE UP (ref 0–6)
FLUAV H1 2009 PAND RNA SPEC QL NAA+PROBE: SIGNIFICANT CHANGE UP
FLUAV H1 RNA SPEC QL NAA+PROBE: SIGNIFICANT CHANGE UP
FLUAV H3 RNA SPEC QL NAA+PROBE: SIGNIFICANT CHANGE UP
FLUAV SUBTYP SPEC NAA+PROBE: SIGNIFICANT CHANGE UP
FLUBV RNA SPEC QL NAA+PROBE: SIGNIFICANT CHANGE UP
GIANT PLATELETS BLD QL SMEAR: PRESENT — SIGNIFICANT CHANGE UP
GLUCOSE SERPL-MCNC: 286 MG/DL — HIGH (ref 70–99)
HADV DNA SPEC QL NAA+PROBE: SIGNIFICANT CHANGE UP
HCOV PNL SPEC NAA+PROBE: SIGNIFICANT CHANGE UP
HCT VFR BLD CALC: 43.1 % — SIGNIFICANT CHANGE UP (ref 39–50)
HGB BLD-MCNC: 13.9 G/DL — SIGNIFICANT CHANGE UP (ref 13–17)
HMPV RNA SPEC QL NAA+PROBE: SIGNIFICANT CHANGE UP
HPIV1 RNA SPEC QL NAA+PROBE: SIGNIFICANT CHANGE UP
HPIV2 RNA SPEC QL NAA+PROBE: SIGNIFICANT CHANGE UP
HPIV3 RNA SPEC QL NAA+PROBE: SIGNIFICANT CHANGE UP
HPIV4 RNA SPEC QL NAA+PROBE: SIGNIFICANT CHANGE UP
INR BLD: 0.92 RATIO — SIGNIFICANT CHANGE UP (ref 0.85–1.16)
LACTATE BLDV-MCNC: 2.5 MMOL/L — HIGH (ref 0.5–2)
LYMPHOCYTES # BLD AUTO: 0.17 K/UL — LOW (ref 1–3.3)
LYMPHOCYTES # BLD AUTO: 0.9 % — LOW (ref 13–44)
MANUAL SMEAR VERIFICATION: SIGNIFICANT CHANGE UP
MCHC RBC-ENTMCNC: 30.5 PG — SIGNIFICANT CHANGE UP (ref 27–34)
MCHC RBC-ENTMCNC: 32.3 G/DL — SIGNIFICANT CHANGE UP (ref 32–36)
MCV RBC AUTO: 94.5 FL — SIGNIFICANT CHANGE UP (ref 80–100)
MONOCYTES # BLD AUTO: 0.5 K/UL — SIGNIFICANT CHANGE UP (ref 0–0.9)
MONOCYTES NFR BLD AUTO: 2.6 % — SIGNIFICANT CHANGE UP (ref 2–14)
NEUTROPHILS # BLD AUTO: 18.44 K/UL — HIGH (ref 1.8–7.4)
NEUTROPHILS NFR BLD AUTO: 96.5 % — HIGH (ref 43–77)
PLAT MORPH BLD: NORMAL — SIGNIFICANT CHANGE UP
PLATELET # BLD AUTO: 210 K/UL — SIGNIFICANT CHANGE UP (ref 150–400)
POLYCHROMASIA BLD QL SMEAR: SLIGHT — SIGNIFICANT CHANGE UP
POTASSIUM SERPL-MCNC: 5.4 MMOL/L — HIGH (ref 3.5–5.3)
POTASSIUM SERPL-SCNC: 5.4 MMOL/L — HIGH (ref 3.5–5.3)
PROT SERPL-MCNC: 6.4 G/DL — LOW (ref 6.6–8.7)
PROTHROM AB SERPL-ACNC: 10.7 SEC — SIGNIFICANT CHANGE UP (ref 9.9–13.4)
RAPID RVP RESULT: DETECTED
RBC # BLD: 4.56 M/UL — SIGNIFICANT CHANGE UP (ref 4.2–5.8)
RBC # FLD: 14.6 % — HIGH (ref 10.3–14.5)
RBC BLD AUTO: ABNORMAL
RSV RNA SPEC QL NAA+PROBE: DETECTED
RV+EV RNA SPEC QL NAA+PROBE: SIGNIFICANT CHANGE UP
SARS-COV-2 RNA SPEC QL NAA+PROBE: SIGNIFICANT CHANGE UP
SODIUM SERPL-SCNC: 138 MMOL/L — SIGNIFICANT CHANGE UP (ref 135–145)
WBC # BLD: 19.11 K/UL — HIGH (ref 3.8–10.5)
WBC # FLD AUTO: 19.11 K/UL — HIGH (ref 3.8–10.5)

## 2024-12-23 PROCEDURE — 96375 TX/PRO/DX INJ NEW DRUG ADDON: CPT

## 2024-12-23 PROCEDURE — 83605 ASSAY OF LACTIC ACID: CPT

## 2024-12-23 PROCEDURE — 71250 CT THORAX DX C-: CPT | Mod: MC

## 2024-12-23 PROCEDURE — 99284 EMERGENCY DEPT VISIT MOD MDM: CPT | Mod: GC

## 2024-12-23 PROCEDURE — 80053 COMPREHEN METABOLIC PANEL: CPT

## 2024-12-23 PROCEDURE — 99285 EMERGENCY DEPT VISIT HI MDM: CPT | Mod: 25

## 2024-12-23 PROCEDURE — 85610 PROTHROMBIN TIME: CPT

## 2024-12-23 PROCEDURE — 93005 ELECTROCARDIOGRAM TRACING: CPT

## 2024-12-23 PROCEDURE — 85730 THROMBOPLASTIN TIME PARTIAL: CPT

## 2024-12-23 PROCEDURE — 96374 THER/PROPH/DIAG INJ IV PUSH: CPT

## 2024-12-23 PROCEDURE — 87040 BLOOD CULTURE FOR BACTERIA: CPT

## 2024-12-23 PROCEDURE — 93010 ELECTROCARDIOGRAM REPORT: CPT

## 2024-12-23 PROCEDURE — 0225U NFCT DS DNA&RNA 21 SARSCOV2: CPT

## 2024-12-23 PROCEDURE — 85025 COMPLETE CBC W/AUTO DIFF WBC: CPT

## 2024-12-23 PROCEDURE — 36415 COLL VENOUS BLD VENIPUNCTURE: CPT

## 2024-12-23 PROCEDURE — 94640 AIRWAY INHALATION TREATMENT: CPT

## 2024-12-23 PROCEDURE — 71250 CT THORAX DX C-: CPT | Mod: 26,MC

## 2024-12-23 RX ORDER — IPRATROPIUM BROMIDE AND ALBUTEROL SULFATE 2.5; .5 MG/3ML; MG/3ML
3 SOLUTION RESPIRATORY (INHALATION)
Refills: 0 | Status: COMPLETED | OUTPATIENT
Start: 2024-12-23 | End: 2024-12-23

## 2024-12-23 RX ORDER — CEFTRIAXONE SODIUM 1 G
1000 VIAL (EA) INJECTION ONCE
Refills: 0 | Status: COMPLETED | OUTPATIENT
Start: 2024-12-23 | End: 2024-12-23

## 2024-12-23 RX ORDER — SODIUM CHLORIDE 9 MG/ML
2300 INJECTION, SOLUTION INTRAMUSCULAR; INTRAVENOUS; SUBCUTANEOUS ONCE
Refills: 0 | Status: COMPLETED | OUTPATIENT
Start: 2024-12-23 | End: 2024-12-23

## 2024-12-23 RX ORDER — AZITHROMYCIN 250 MG/1
500 TABLET, FILM COATED ORAL ONCE
Refills: 0 | Status: COMPLETED | OUTPATIENT
Start: 2024-12-23 | End: 2024-12-23

## 2024-12-23 RX ORDER — CEFTRIAXONE SODIUM 1 G
1000 VIAL (EA) INJECTION ONCE
Refills: 0 | Status: DISCONTINUED | OUTPATIENT
Start: 2024-12-23 | End: 2024-12-23

## 2024-12-23 RX ORDER — METHYLPREDNISOLONE SOD SUCC 125 MG
125 VIAL (EA) INJECTION ONCE
Refills: 0 | Status: COMPLETED | OUTPATIENT
Start: 2024-12-23 | End: 2024-12-23

## 2024-12-23 RX ADMIN — AZITHROMYCIN 255 MILLIGRAM(S): 250 TABLET, FILM COATED ORAL at 16:13

## 2024-12-23 RX ADMIN — Medication 1000 MILLIGRAM(S): at 16:13

## 2024-12-23 RX ADMIN — Medication 125 MILLIGRAM(S): at 16:13

## 2024-12-23 RX ADMIN — IPRATROPIUM BROMIDE AND ALBUTEROL SULFATE 3 MILLILITER(S): 2.5; .5 SOLUTION RESPIRATORY (INHALATION) at 16:00

## 2024-12-23 RX ADMIN — IPRATROPIUM BROMIDE AND ALBUTEROL SULFATE 3 MILLILITER(S): 2.5; .5 SOLUTION RESPIRATORY (INHALATION) at 15:10

## 2024-12-23 RX ADMIN — IPRATROPIUM BROMIDE AND ALBUTEROL SULFATE 3 MILLILITER(S): 2.5; .5 SOLUTION RESPIRATORY (INHALATION) at 14:50

## 2024-12-23 RX ADMIN — SODIUM CHLORIDE 2300 MILLILITER(S): 9 INJECTION, SOLUTION INTRAMUSCULAR; INTRAVENOUS; SUBCUTANEOUS at 16:11

## 2024-12-23 NOTE — ED PROVIDER NOTE - CLINICAL SUMMARY MEDICAL DECISION MAKING FREE TEXT BOX
74M with hx ILD, COPD, recent admission for acute hypoxemic respiratory failure due to above presenting today with 2 days of cough productive of brown sputum. Oral temp 99F. Plan for rectal temp, labs, CT chest, empiric abx, reassess.  Disposition pending results/clinical course.

## 2024-12-23 NOTE — ED PROVIDER NOTE - PHYSICAL EXAMINATION
Gen: well nourished, no acute distress  Head: normocephalic, atraumatic  EENT: EOMI, (+)tacky mucous membranes  Lung: no increased work of breathing, clear to auscultation bilaterally, no wheezing, rales, rhonchi, speaking in full sentences. pulse ox : 95% on RA  CV: regular rate, regular rhythm  Abd: soft, non-tender, non-distended  MSK: No edema, no visible deformities, full range of motion in all 4 extremities  Neuro: Awake, alert, clear speech

## 2024-12-23 NOTE — ED ADULT TRIAGE NOTE - CHIEF COMPLAINT QUOTE
+ PNA; c/o SOB, wet cough w/ brown sputum, nasal and chest congestion. pt recently d/c from [facility] for hypoxia w/ home o2.

## 2024-12-23 NOTE — ED ADULT TRIAGE NOTE - AVIAN FLU SYMPTOMS
Pt is due for sacubitril/valsartan, but BP has been running soft all day.
Pt BP was running soft.
patients blood pressure is 98/54 prior to AM medication. patient blood pressure has been running soft per vital sign flow sheet.
No

## 2024-12-23 NOTE — ED ADULT NURSE NOTE - CHIEF COMPLAINT QUOTE
+ PNA; c/o SOB, wet cough w/ brown sputum, nasal and chest congestion. pt recently d/c from [facility] for hypoxia w/ home o2.

## 2024-12-23 NOTE — ED ADULT NURSE REASSESSMENT NOTE - NS ED NURSE REASSESS COMMENT FT1
Delay in IVF infusion due to patient bending arm.education provided on keeping arm straight and pt up to date on plan of care.

## 2024-12-23 NOTE — ED PROVIDER NOTE - PROGRESS NOTE DETAILS
Teri Tubbs, DO: Patient resting comfortably, no acute distress. Pending CT at this time. Ravi: Pt received in signout from Dr. Mac. Pt in no acute distress, resting comfortably, satting well on 2 L NC. CT/labs without emergent findings. RVP positive for RSV. I updated pt and wife; they are comfortable with plan for discharge.

## 2024-12-23 NOTE — ED PROVIDER NOTE - OBJECTIVE STATEMENT
74 year old male with PMHx ILD, COPD/NJ, CKD, HTN, HLD, anxiety/depression, NIDDM presenting with 2 days of productive cough associated with chest congestion. Patient recently admitted to [facility] 12/10-12/7 with acute hypoxemic respiratory failure 2/2 ?progression of ILD / possible bronchitis treated with azithromycin, discharged with home O2 2L/min. Patient states was doing well at home until 2 days ago when he developed cough productive of brown sputum and dyspnea on exertion above baseline. Denies any known sick contacts, chest pain, shortness of breath with rest, palpitations, fevers, other complaints. Seen at urgent care who after the CXR sent patient to ED for CT chest.

## 2024-12-23 NOTE — ED PROVIDER NOTE - ATTENDING CONTRIBUTION TO CARE
74-year-old male presents to ED   referred from for urgent care for further evaluation s/p  recent admission to [facility] for respiratory failure  with hypoxemia secondary to interstitial lung disease versus bronchitis and was discharged with azithromycin.   patient with reported worsening cough times last 2 days with productive brown sputum and shortness of breath/dyspnea on exertion.  on exam patient awake and alert no acute respiratory distress, PERRL, mucosa members moist, neck supple, heart regular, lungs clear bilaterally, no tachypnea or accessory muscle use, abdomen soft no localized tenderness, extremities no cyanosis or edema, neuro no focal deficits.  Will check labs,  cultures, start empiric antibiotics, and CT based  recent history 74-year-old male presents to ED   referred from for urgent care for further evaluation s/p  recent admission to [facility] for respiratory failure  with hypoxemia secondary to interstitial lung disease versus bronchitis and was discharged with azithromycin.   patient with reported worsening cough times last 2 days with productive brown sputum and shortness of breath/dyspnea on exertion.  on exam patient awake and alert no acute respiratory distress, PERRL, mucosa members moist, neck supple, heart regular, lungs clear bilaterally, no tachypnea or accessory muscle use, abdomen soft no localized tenderness, extremities no cyanosis or edema, neuro no focal deficits.  Will check labs,  cultures, start empiric antibiotics, and CT based  recent history

## 2024-12-23 NOTE — ED PROVIDER NOTE - NSICDXPASTMEDICALHX_GEN_ALL_CORE_FT
PAST MEDICAL HISTORY:  Diabetes     Diverticulitis     History of COPD     Hypercholesteremia     Obesity     NJ (obstructive sleep apnea) mouthpiece    JN and COPD overlap syndrome

## 2024-12-23 NOTE — ED PROVIDER NOTE - PATIENT PORTAL LINK FT
You can access the FollowMyHealth Patient Portal offered by [facility] by registering at the following website: [email or web address]. By joining [facility] FollowMyHealth portal, you will also be able to view your health information using other applications (apps) compatible with our system.

## 2024-12-23 NOTE — ED ADULT NURSE NOTE - OBJECTIVE STATEMENT
Pt A&Ox4 presenting to the ED c/o persistent cough, congestion and sputum. PMH HLD, HTN. Pt denies fever, chills, n/v/d, shortness of breath, chest pain. Pt endorses [facility]. Pt states "I was here for the same thing and they said it was acute respiratory failure. Now I wear oxygen at home." pt remains on continuos cardiac monitoring NSR HR 81,  95 2L nasal cannula. Resps are even and nonlabored and pt is NAD. Bed is locked and in lowest position with safety maintained.

## 2024-12-23 NOTE — ED PROVIDER NOTE - NSFOLLOWUPINSTRUCTIONS_ED_ALL_ED_FT
- Continue with current medications  - Follow up with primary care doctor and pulmonologist  - Return to the emergency room for any new or worsening issues    Respiratory Syncytial Virus Infection, Adult    Respiratory syncytial virus (RSV) infection is an infection caused by RSV, a common virus. This virus is similar to viruses that cause the common cold and the flu. RSV infection can affect the nose, throat, windpipe, and lungs (respiratory system). When the infection is severe, it can cause:  Bronchiolitis. This condition causes inflammation of the air passages in the lungs (bronchioles).  Pneumonia. This condition causes inflammation of the air sacs in the lungs.  RSV infection spreads from person to person (is contagious) through droplets from coughs and sneezes (respiratory secretions). This condition is rarely serious when it occurs in adults.    What are the causes?  This condition is caused by contact with RSV. This can happen by:  Breathing respiratory secretions from someone who has the infection.  Touching something that has been exposed to the virus (is contaminated) and then touching your mouth, nose, or eyes.  Coming in close contact with someone who has this infection. This may happen if you:  Hug or kiss.  Shake or hold hands.  Eat or drink using the same dishes or utensils.  What increases the risk?  The following factors may make you more likely to develop this condition:  Being 65 years of age or older.  Having certain health conditions, including:  A long-term (chronic) lung condition, such as chronic obstructive pulmonary disease (COPD).  An immune system that is weak. This is your body's defense system.  Down syndrome.  Heart disease.  Working in a hospital or other health care facility.  Living in a long-term health care facility.  RSV infections are most common from the months of November to April, but they can happen any time of year.    What are the signs or symptoms?  Symptoms of this condition include:  Having a runny nose.  Coughing. You may have a cough that brings up mucus (productive cough).  Sneezing.  Having a fever.  Wanting to eat less than usual.  Breathing loudly (wheezing).  Having shortness of breath.  Having fluid build up in the lungs (respiratory distress).  How is this diagnosed?  This condition may be diagnosed based on:  Your symptoms.  Your medical history.  A physical exam.  A chest X-ray to rule out pneumonia.  Blood tests or tests of mucus from your lungs (sputum). These tests may be done for older adults.  A test of a sample of your respiratory secretions.  How is this treated?  In most cases, the RSV infection will go away after 1–2 weeks of caring for yourself at home.    Sometimes, RSV infection is severe and can cause bronchiolitis or pneumonia. If you develop one or both of these conditions, you may need to be treated in the hospital. You may be given:  Oxygen therapy.  Antiviral medicine.  Medicines to open your bronchioles (bronchodilators).  Follow these instructions at home:  Medicines    Take over-the-counter and prescription medicines only as told by your health care provider.  If you were prescribed an antiviral medicine, take it as told by your health care provider. Do not stop using the antiviral even if you start to feel better.  Lifestyle      Eat a healthy diet.  Do not drink alcohol.  Do not use any products that contain nicotine or tobacco, such as cigarettes, e-cigarettes, and chewing tobacco. If you need help quitting, ask your health care provider.  Rest at home until your symptoms go away.  Return to your normal activities as told by your health care provider. Ask your health care provider what activities are safe for you.  General instructions      Drink enough fluid to keep your urine pale yellow.  Gargle with a salt–water mixture 3–4 times a day or as needed. To make a salt–water mixture, completely dissolve ½–1 tsp (3–6 g) of salt in 1 cup (237 mL) of warm water.  Keep all follow-up visits as told by your health care provider. This is important.  How is this prevented?    To prevent catching and spreading RSV:  Wash your hands often with soap and water for at least 20 seconds. If soap and water are not available, use hand . Do not touch your face without first cleaning your hands.  Stay home if you have symptoms of the common cold or the flu.  Cover your nose and mouth when you cough or sneeze.  Avoid large groups of people.  Keep a safe distance of about 6 feet (1.8 m) from people who are coughing or sneezing.  Where to find more information  Centers for Disease Control and Prevention: www.cdc.gov  Contact a health care provider if:  Your symptoms get worse or have not changed after 2 weeks.  You have:  A fever.  Hot flashes, sweating, or chills that keep happening.  A cough that brings up much more mucus than usual.  A cough that brings up blood.  You feel:  Very tired (lethargic).  Confused.  Get help right away if:  You have increased or severe trouble breathing.  You lose consciousness.  These symptoms may represent a serious problem that is an emergency. Do not wait to see if the symptoms will go away. Get medical help right away. Call your local emergency services (911 in the [place] Do not drive yourself to the hospital.    Summary  Respiratory syncytial virus (RSV) infection is an infection caused by RSV, a common virus. RSV infection can affect the nose, throat, windpipe, and lungs (respiratory system).  When the infection is severe, it can cause bronchiolitis or pneumonia.  Take over-the-counter and prescription medicines only as told by your health care provider.  Contact a health care provider if your symptoms get worse or have not changed after 2 weeks.  This information is not intended to replace advice given to you by your health care provider. Make sure you discuss any questions you have with your health care provider.

## 2024-12-23 NOTE — ED PROVIDER NOTE - CARE PROVIDER_API CALL
Fede Wilkinson  Pulmonary Disease  [address], [address]  [place] [address]  Phone: [phone]  Fax: [phone]  Follow Up Time:

## 2024-12-26 DIAGNOSIS — Z79.4 LONG TERM (CURRENT) USE OF INSULIN: ICD-10-CM

## 2024-12-26 DIAGNOSIS — F41.9 ANXIETY DISORDER, UNSPECIFIED: ICD-10-CM

## 2024-12-26 DIAGNOSIS — J44.9 CHRONIC OBSTRUCTIVE PULMONARY DISEASE, UNSPECIFIED: ICD-10-CM

## 2024-12-26 DIAGNOSIS — E11.22 TYPE 2 DIABETES MELLITUS WITH DIABETIC CHRONIC KIDNEY DISEASE: ICD-10-CM

## 2024-12-26 DIAGNOSIS — E78.5 HYPERLIPIDEMIA, UNSPECIFIED: ICD-10-CM

## 2024-12-26 DIAGNOSIS — B97.4 RESPIRATORY SYNCYTIAL VIRUS AS THE CAUSE OF DISEASES CLASSIFIED ELSEWHERE: ICD-10-CM

## 2024-12-26 DIAGNOSIS — N18.9 CHRONIC KIDNEY DISEASE, UNSPECIFIED: ICD-10-CM

## 2024-12-26 DIAGNOSIS — F32.A DEPRESSION, UNSPECIFIED: ICD-10-CM

## 2024-12-26 DIAGNOSIS — R05.9 COUGH, UNSPECIFIED: ICD-10-CM

## 2024-12-26 DIAGNOSIS — I12.9 HYPERTENSIVE CHRONIC KIDNEY DISEASE WITH STAGE 1 THROUGH STAGE 4 CHRONIC KIDNEY DISEASE, OR UNSPECIFIED CHRONIC KIDNEY DISEASE: ICD-10-CM

## 2024-12-26 DIAGNOSIS — Z87.09 PERSONAL HISTORY OF OTHER DISEASES OF THE RESPIRATORY SYSTEM: ICD-10-CM

## 2024-12-29 LAB
CULTURE RESULTS: SIGNIFICANT CHANGE UP
SPECIMEN SOURCE: SIGNIFICANT CHANGE UP

## 2024-12-30 DIAGNOSIS — E11.9 TYPE 2 DIABETES MELLITUS W/OUT COMPLICATIONS: ICD-10-CM

## 2024-12-30 RX ORDER — BLOOD-GLUCOSE METER
W/DEVICE KIT MISCELLANEOUS
Qty: 1 | Refills: 0 | Status: ACTIVE | COMMUNITY
Start: 2024-12-30 | End: 1900-01-01

## 2024-12-30 RX ORDER — ISOPROPYL ALCOHOL 70 ML/100ML
SWAB TOPICAL
Qty: 1 | Refills: 0 | Status: ACTIVE | COMMUNITY
Start: 2024-12-30 | End: 1900-01-01

## 2024-12-30 RX ORDER — BLOOD-GLUCOSE METER
KIT MISCELLANEOUS DAILY
Qty: 1 | Refills: 1 | Status: ACTIVE | COMMUNITY
Start: 2024-12-30 | End: 1900-01-01

## 2024-12-30 RX ORDER — LANCETS 33 GAUGE
EACH MISCELLANEOUS
Qty: 1 | Refills: 1 | Status: ACTIVE | COMMUNITY
Start: 2024-12-30 | End: 1900-01-01

## 2025-01-02 ENCOUNTER — APPOINTMENT (OUTPATIENT)
Dept: PULMONOLOGY | Facility: CLINIC | Age: 75
End: 2025-01-02
Payer: MEDICARE

## 2025-01-02 VITALS
BODY MASS INDEX: 38.32 KG/M2 | WEIGHT: 247 LBS | SYSTOLIC BLOOD PRESSURE: 120 MMHG | DIASTOLIC BLOOD PRESSURE: 64 MMHG | RESPIRATION RATE: 16 BRPM | HEIGHT: 67.5 IN

## 2025-01-02 VITALS — OXYGEN SATURATION: 93 % | HEART RATE: 74 BPM

## 2025-01-02 VITALS — OXYGEN SATURATION: 93 %

## 2025-01-02 DIAGNOSIS — R06.09 OTHER FORMS OF DYSPNEA: ICD-10-CM

## 2025-01-02 DIAGNOSIS — R60.0 LOCALIZED EDEMA: ICD-10-CM

## 2025-01-02 DIAGNOSIS — J84.9 INTERSTITIAL PULMONARY DISEASE, UNSPECIFIED: ICD-10-CM

## 2025-01-02 PROCEDURE — 99215 OFFICE O/P EST HI 40 MIN: CPT

## 2025-01-02 PROCEDURE — G2211 COMPLEX E/M VISIT ADD ON: CPT

## 2025-01-03 ENCOUNTER — OUTPATIENT (OUTPATIENT)
Dept: OUTPATIENT SERVICES | Facility: HOSPITAL | Age: 75
LOS: 1 days | End: 2025-01-03

## 2025-01-03 ENCOUNTER — APPOINTMENT (OUTPATIENT)
Dept: ULTRASOUND IMAGING | Facility: CLINIC | Age: 75
End: 2025-01-03
Payer: MEDICARE

## 2025-01-03 DIAGNOSIS — Z90.49 ACQUIRED ABSENCE OF OTHER SPECIFIED PARTS OF DIGESTIVE TRACT: Chronic | ICD-10-CM

## 2025-01-03 DIAGNOSIS — R60.0 LOCALIZED EDEMA: ICD-10-CM

## 2025-01-03 DIAGNOSIS — Z98.890 OTHER SPECIFIED POSTPROCEDURAL STATES: Chronic | ICD-10-CM

## 2025-01-03 PROCEDURE — 93970 EXTREMITY STUDY: CPT | Mod: 26

## 2025-01-05 PROBLEM — R60.9 EDEMA: Status: ACTIVE | Noted: 2025-01-05

## 2025-01-06 ENCOUNTER — APPOINTMENT (OUTPATIENT)
Dept: ULTRASOUND IMAGING | Facility: CLINIC | Age: 75
End: 2025-01-06
Payer: MEDICARE

## 2025-01-06 ENCOUNTER — OUTPATIENT (OUTPATIENT)
Dept: OUTPATIENT SERVICES | Facility: HOSPITAL | Age: 75
LOS: 1 days | End: 2025-01-06

## 2025-01-06 DIAGNOSIS — R60.9 EDEMA, UNSPECIFIED: ICD-10-CM

## 2025-01-06 DIAGNOSIS — Z90.49 ACQUIRED ABSENCE OF OTHER SPECIFIED PARTS OF DIGESTIVE TRACT: Chronic | ICD-10-CM

## 2025-01-06 PROCEDURE — 93971 EXTREMITY STUDY: CPT | Mod: 26,RT

## 2025-01-08 ENCOUNTER — APPOINTMENT (OUTPATIENT)
Dept: VASCULAR SURGERY | Facility: CLINIC | Age: 75
End: 2025-01-08
Payer: MEDICARE

## 2025-01-08 VITALS
OXYGEN SATURATION: 88 % | TEMPERATURE: 97.3 F | HEIGHT: 67 IN | SYSTOLIC BLOOD PRESSURE: 124 MMHG | RESPIRATION RATE: 16 BRPM | HEART RATE: 93 BPM | DIASTOLIC BLOOD PRESSURE: 64 MMHG | WEIGHT: 237 LBS | BODY MASS INDEX: 37.2 KG/M2

## 2025-01-08 DIAGNOSIS — I82.890 ACUTE EMBOLISM AND THROMBOSIS OF OTHER SPECIFIED VEINS: ICD-10-CM

## 2025-01-08 PROCEDURE — 93971 EXTREMITY STUDY: CPT | Mod: RT

## 2025-01-08 PROCEDURE — 99202 OFFICE O/P NEW SF 15 MIN: CPT

## 2025-01-10 RX ORDER — NINTEDANIB 100 MG/1
100 CAPSULE ORAL TWICE DAILY
Qty: 60 | Refills: 5 | Status: ACTIVE | COMMUNITY
Start: 2025-01-02 | End: 1900-01-01

## 2025-01-15 ENCOUNTER — NON-APPOINTMENT (OUTPATIENT)
Age: 75
End: 2025-01-15

## 2025-01-24 ENCOUNTER — RX RENEWAL (OUTPATIENT)
Age: 75
End: 2025-01-24

## 2025-01-30 ENCOUNTER — APPOINTMENT (OUTPATIENT)
Dept: INTERNAL MEDICINE | Facility: CLINIC | Age: 75
End: 2025-01-30
Payer: MEDICARE

## 2025-01-30 VITALS
OXYGEN SATURATION: 93 % | SYSTOLIC BLOOD PRESSURE: 112 MMHG | BODY MASS INDEX: 38.45 KG/M2 | HEART RATE: 65 BPM | DIASTOLIC BLOOD PRESSURE: 60 MMHG | WEIGHT: 245 LBS | HEIGHT: 67 IN

## 2025-01-30 DIAGNOSIS — N18.31 CHRONIC KIDNEY DISEASE, STAGE 3A: ICD-10-CM

## 2025-01-30 DIAGNOSIS — J84.9 INTERSTITIAL PULMONARY DISEASE, UNSPECIFIED: ICD-10-CM

## 2025-01-30 DIAGNOSIS — I10 ESSENTIAL (PRIMARY) HYPERTENSION: ICD-10-CM

## 2025-01-30 DIAGNOSIS — E11.49 TYPE 2 DIABETES MELLITUS WITH OTHER DIABETIC NEUROLOGICAL COMPLICATION: ICD-10-CM

## 2025-01-30 DIAGNOSIS — J44.9 CHRONIC OBSTRUCTIVE PULMONARY DISEASE, UNSPECIFIED: ICD-10-CM

## 2025-01-30 DIAGNOSIS — I82.890 ACUTE EMBOLISM AND THROMBOSIS OF OTHER SPECIFIED VEINS: ICD-10-CM

## 2025-01-30 DIAGNOSIS — I20.89 OTHER FORMS OF ANGINA PECTORIS: ICD-10-CM

## 2025-01-30 PROCEDURE — 99214 OFFICE O/P EST MOD 30 MIN: CPT

## 2025-01-30 PROCEDURE — G2211 COMPLEX E/M VISIT ADD ON: CPT

## 2025-01-30 PROCEDURE — 36415 COLL VENOUS BLD VENIPUNCTURE: CPT

## 2025-02-03 ENCOUNTER — APPOINTMENT (OUTPATIENT)
Dept: PULMONOLOGY | Facility: CLINIC | Age: 75
End: 2025-02-03
Payer: MEDICARE

## 2025-02-03 VITALS
WEIGHT: 240 LBS | BODY MASS INDEX: 37.67 KG/M2 | SYSTOLIC BLOOD PRESSURE: 120 MMHG | RESPIRATION RATE: 16 BRPM | HEIGHT: 67 IN | DIASTOLIC BLOOD PRESSURE: 60 MMHG

## 2025-02-03 VITALS — OXYGEN SATURATION: 93 % | HEART RATE: 70 BPM

## 2025-02-03 PROCEDURE — 99215 OFFICE O/P EST HI 40 MIN: CPT

## 2025-02-05 LAB
ALBUMIN SERPL ELPH-MCNC: 4.2 G/DL
ALP BLD-CCNC: 119 U/L
ALT SERPL-CCNC: 13 U/L
ANION GAP SERPL CALC-SCNC: 12 MMOL/L
AST SERPL-CCNC: 12 U/L
BASOPHILS # BLD AUTO: 0.1 K/UL
BASOPHILS NFR BLD AUTO: 0.8 %
BILIRUB SERPL-MCNC: 0.8 MG/DL
BUN SERPL-MCNC: 19 MG/DL
CALCIUM SERPL-MCNC: 9.5 MG/DL
CHLORIDE SERPL-SCNC: 104 MMOL/L
CO2 SERPL-SCNC: 25 MMOL/L
CREAT SERPL-MCNC: 1.65 MG/DL
EGFR: 43 ML/MIN/1.73M2
EOSINOPHIL # BLD AUTO: 0.47 K/UL
EOSINOPHIL NFR BLD AUTO: 4 %
ESTIMATED AVERAGE GLUCOSE: 151 MG/DL
GLUCOSE SERPL-MCNC: 85 MG/DL
HBA1C MFR BLD HPLC: 6.9 %
HCT VFR BLD CALC: 37.9 %
HGB BLD-MCNC: 11.6 G/DL
IMM GRANULOCYTES NFR BLD AUTO: 0.7 %
LYMPHOCYTES # BLD AUTO: 2.06 K/UL
LYMPHOCYTES NFR BLD AUTO: 17.3 %
MAN DIFF?: NORMAL
MCHC RBC-ENTMCNC: 30.2 PG
MCHC RBC-ENTMCNC: 30.6 G/DL
MCV RBC AUTO: 98.7 FL
MONOCYTES # BLD AUTO: 0.94 K/UL
MONOCYTES NFR BLD AUTO: 7.9 %
NEUTROPHILS # BLD AUTO: 8.23 K/UL
NEUTROPHILS NFR BLD AUTO: 69.3 %
PLATELET # BLD AUTO: 303 K/UL
POTASSIUM SERPL-SCNC: 4.7 MMOL/L
PROT SERPL-MCNC: 6.8 G/DL
RBC # BLD: 3.84 M/UL
RBC # FLD: 14.8 %
SODIUM SERPL-SCNC: 141 MMOL/L
WBC # FLD AUTO: 11.88 K/UL

## 2025-02-06 ENCOUNTER — NON-APPOINTMENT (OUTPATIENT)
Age: 75
End: 2025-02-06

## 2025-02-14 LAB
A FLAVUS AB SER QL ID: NORMAL
A FUMIGATUS1 AB SER QL ID: NORMAL
A FUMIGATUS2 AB SER QL ID: NORMAL
A FUMIGATUS3 AB SER QL ID: NORMAL
A FUMIGATUS6 AB SER QL ID: NORMAL
A PULLULANS AB SER QL ID: NORMAL
ACE BLD-CCNC: 45 U/L
ALDOLASE SERPL-CCNC: 5.7 U/L
ANA SER IF-ACNC: NEGATIVE
ANNOTATION COMMENT IMP: NORMAL
ANTI-BETA2 GLYCOPROTEIN 1 IGG CONCENTRATION (ELFA): 1.6 U/ML
ANTI-BETA2 GLYCOPROTEIN 1 IGM CONCENTRATION (ELFA): 2.5 U/ML
ANTI-CARDIOLIPIN IGG CONCENTRATION (ELFA): 1.2 GPL
ANTI-CARDIOLIPIN IGM CONCENTRATION (ELFA): 1.1 MPL
ANTI-CENP IGG CONCENTRATION (ELFA): 0.5 U/ML
ANTI-CYCLIC CITRULLINATED PEPTIDE IGG CONCENTRATION (ELFA): 1.6 U/ML
ANTI-DOUBLE-STRANDED DNA IGG CONCENTRATION (ELISA): 55.45 IU/ML
ANTI-JO-1 IGG CONCENTRATION (ELFA): <0.3 U/ML
ANTI-NUCLEAR ANTIBODIES - CYTOPLASMIC PATTERN: NORMAL
ANTI-NUCLEAR ANTIBODIES - PRIMARY NUCLEAR PATTERN: NORMAL
ANTI-NUCLEAR ANTIBODIES - PRIMARY PATTERN TITER (IFA): NEGATIVE
ANTI-NUCLEAR ANTIBODIES IGG CONCENTRATION (ELISA): 10.06 UNITS
ANTI-RNA POL III IGG CONCENTRATION (ELFA): <0.7 U/ML
ANTI-RNP70 IGG CONCENTRATION (ELFA): 2.7 U/ML
ANTI-RO52 IGG CONCENTRATION (ELFA): 0.4 U/ML
ANTI-RO60 IGG CONCENTRATION (ELFA): 0.4 U/ML
ANTI-SCL-70 IGG CONCENTRATION (ELFA): 0.6 U/ML
ANTI-SMITH IGG CONCENTRATION (ELFA): 0.9 U/ML
ANTI-SS-B (LA) IGG CONCENTRATION (ELFA): 1.2 U/ML
ANTI-THYROGLOBULIN IGG CONCENTRATION (ELFA): 13 IU/ML
ANTI-THYROID PEROXIDASE IGG CONCENTRATION (ELFA): <4 IU/ML
ANTI-U1RNP IGG CONCENTRATION (ELFA): 12 U/ML
AVISE LUPUS INDEX: -2
AVISE LUPUS RESULT: NEGATIVE
B-LYMPHOCYTE-BOUND C4D (BC4D) LEVEL (FC): 11
BEEF IGE QN: <0.1 KU/L
C3 SERPL-MCNC: 179 MG/DL
C4 SERPL-MCNC: 33 MG/DL
CA VI IGA AB: 21 EU/ML
CA VI IGG AB: 18.1 EU/ML
CA VI IGM AB: 3.8 EU/ML
CENP-A: <11 SI
CENP-B: <11 SI
CENTROMERE IGG SER-ACNC: <0.2 AL
CRP SERPL-MCNC: 6 MG/L
CRP SERPL-MCNC: 6 MG/L
DSDNA AB SER-ACNC: <1 IU/ML
ENA RNP AB SER IA-ACNC: <0.2 AL
ENA SM AB SER IA-ACNC: <0.2 AL
ENA SS-A AB SER IA-ACNC: <0.2 AL
ENA SS-B AB SER IA-ACNC: <0.2 AL
EPID ALLERG MIX73 IGE QN: NEGATIVE KU/L
ERYTHROCYTE [SEDIMENTATION RATE] IN BLOOD BY WESTERGREN METHOD: 32 MM/HR
ERYTHROCYTE-BOUND C4D (EC4D) LEVEL (FC): 3
FIBRILLARIN: <11 SI
IGG SER QL IEP: 890 MG/DL
IGG1 SER-MCNC: 449 MG/DL
IGG2 SER-MCNC: 304 MG/DL
IGG3 SER-MCNC: 31.2 MG/DL
IGG4 SER-MCNC: 70.1 MG/DL
M TB IFN-G BLD-IMP: NEGATIVE
P BETAE IGE QN: <0.1 KU/L
PIGEON SERUM AB QL ID: NORMAL
PM/SCL-100: <11 SI
PM/SCL-75: <11 SI
PORK IGE QN: <0.1 KU/L
PSP IGA AB: 15 EU/ML
PSP IGG AB: 18.3 EU/ML
PSP IGM AB: 11.7 EU/ML
QUANTIFERON TB PLUS MITOGEN MINUS NIL: 5.73 IU/ML
QUANTIFERON TB PLUS NIL: 0.05 IU/ML
QUANTIFERON TB PLUS TB1 MINUS NIL: 0 IU/ML
QUANTIFERON TB PLUS TB2 MINUS NIL: 0 IU/ML
RHEUMATOID FACT SER QL: <10 IU/ML
RHEUMATOID FACTOR (IGA) CONCENTRATION (ELFA): 7.9 IU/ML
RHEUMATOID FACTOR (IGM) CONCENTRATION (ELFA): 0.9 IU/ML
RIBOSOMAL P AB SER IA-ACNC: <0.2 AL
RNA POLYMERASE III IGG: 4 UNITS
RP11: <11 SI
RP155: <11 SI
S RECTIVIRGULA AB SER QL ID: NORMAL
S VIRIDIS AB SER QL ID: NORMAL
SCL-70: <11 SI
SEROLOGY COMMENTS: NORMAL
SP-1 IGA AB: 8.9 EU/ML
SP-1 IGG AB: 16.9 EU/ML
SP-1 IGM AB: 26.3 EU/ML
T CANDIDUS AB SER QL: NORMAL
TH/TO: <11 SI
U1-SNRNP RNP A: <11 SI
U1-SNRNP RNP C: 20 SI
U1-SNRNP RNP-70KD: <11 SI

## 2025-02-24 ENCOUNTER — RX RENEWAL (OUTPATIENT)
Age: 75
End: 2025-02-24

## 2025-02-28 ENCOUNTER — NON-APPOINTMENT (OUTPATIENT)
Age: 75
End: 2025-02-28

## 2025-03-03 ENCOUNTER — APPOINTMENT (OUTPATIENT)
Dept: PULMONOLOGY | Facility: CLINIC | Age: 75
End: 2025-03-03
Payer: MEDICARE

## 2025-03-03 PROCEDURE — 94618 PULMONARY STRESS TESTING: CPT

## 2025-03-17 ENCOUNTER — APPOINTMENT (OUTPATIENT)
Dept: PULMONOLOGY | Facility: CLINIC | Age: 75
End: 2025-03-17
Payer: MEDICARE

## 2025-03-17 VITALS
HEART RATE: 68 BPM | SYSTOLIC BLOOD PRESSURE: 128 MMHG | BODY MASS INDEX: 37.83 KG/M2 | OXYGEN SATURATION: 96 % | DIASTOLIC BLOOD PRESSURE: 73 MMHG | HEIGHT: 67 IN | RESPIRATION RATE: 16 BRPM | WEIGHT: 241 LBS

## 2025-03-17 DIAGNOSIS — K21.9 GASTRO-ESOPHAGEAL REFLUX DISEASE W/OUT ESOPHAGITIS: ICD-10-CM

## 2025-03-17 PROCEDURE — 99215 OFFICE O/P EST HI 40 MIN: CPT

## 2025-03-17 RX ORDER — PANTOPRAZOLE 40 MG/1
40 TABLET, DELAYED RELEASE ORAL
Qty: 90 | Refills: 5 | Status: ACTIVE | COMMUNITY
Start: 2025-03-17 | End: 1900-01-01

## 2025-03-17 RX ORDER — SULFAMETHOXAZOLE AND TRIMETHOPRIM 800; 160 MG/1; MG/1
800-160 TABLET ORAL DAILY
Qty: 12 | Refills: 5 | Status: ACTIVE | COMMUNITY
Start: 2025-03-17 | End: 1900-01-01

## 2025-03-17 RX ORDER — PREDNISONE 20 MG/1
20 TABLET ORAL
Qty: 30 | Refills: 0 | Status: ACTIVE | COMMUNITY
Start: 2025-03-17 | End: 1900-01-01

## 2025-04-10 ENCOUNTER — APPOINTMENT (OUTPATIENT)
Dept: PULMONOLOGY | Facility: CLINIC | Age: 75
End: 2025-04-10
Payer: MEDICARE

## 2025-04-10 VITALS
OXYGEN SATURATION: 96 % | SYSTOLIC BLOOD PRESSURE: 126 MMHG | RESPIRATION RATE: 16 BRPM | DIASTOLIC BLOOD PRESSURE: 74 MMHG | HEART RATE: 76 BPM

## 2025-04-10 VITALS — HEIGHT: 67 IN | BODY MASS INDEX: 37.51 KG/M2 | WEIGHT: 239 LBS

## 2025-04-10 DIAGNOSIS — G47.33 OBSTRUCTIVE SLEEP APNEA (ADULT) (PEDIATRIC): ICD-10-CM

## 2025-04-10 DIAGNOSIS — J96.11 CHRONIC RESPIRATORY FAILURE WITH HYPOXIA: ICD-10-CM

## 2025-04-10 DIAGNOSIS — J84.9 INTERSTITIAL PULMONARY DISEASE, UNSPECIFIED: ICD-10-CM

## 2025-04-10 DIAGNOSIS — J44.9 CHRONIC OBSTRUCTIVE PULMONARY DISEASE, UNSPECIFIED: ICD-10-CM

## 2025-04-10 DIAGNOSIS — Z99.81 CHRONIC RESPIRATORY FAILURE WITH HYPOXIA: ICD-10-CM

## 2025-04-10 PROCEDURE — 99214 OFFICE O/P EST MOD 30 MIN: CPT

## 2025-04-10 PROCEDURE — G2211 COMPLEX E/M VISIT ADD ON: CPT

## 2025-04-14 ENCOUNTER — OUTPATIENT (OUTPATIENT)
Dept: OUTPATIENT SERVICES | Facility: HOSPITAL | Age: 75
LOS: 1 days | End: 2025-04-14
Payer: MEDICARE

## 2025-04-14 DIAGNOSIS — Z90.49 ACQUIRED ABSENCE OF OTHER SPECIFIED PARTS OF DIGESTIVE TRACT: Chronic | ICD-10-CM

## 2025-04-14 DIAGNOSIS — G47.33 OBSTRUCTIVE SLEEP APNEA (ADULT) (PEDIATRIC): ICD-10-CM

## 2025-04-14 DIAGNOSIS — Z98.890 OTHER SPECIFIED POSTPROCEDURAL STATES: Chronic | ICD-10-CM

## 2025-04-14 PROCEDURE — G0400: CPT | Mod: 26

## 2025-04-14 PROCEDURE — 95800 SLP STDY UNATTENDED: CPT

## 2025-04-23 ENCOUNTER — RX RENEWAL (OUTPATIENT)
Age: 75
End: 2025-04-23

## 2025-04-28 ENCOUNTER — APPOINTMENT (OUTPATIENT)
Dept: PULMONOLOGY | Facility: CLINIC | Age: 75
End: 2025-04-28
Payer: MEDICARE

## 2025-04-28 VITALS — HEIGHT: 67 IN | BODY MASS INDEX: 39.24 KG/M2 | WEIGHT: 250 LBS

## 2025-04-28 VITALS
SYSTOLIC BLOOD PRESSURE: 127 MMHG | RESPIRATION RATE: 16 BRPM | BODY MASS INDEX: 39.24 KG/M2 | WEIGHT: 250 LBS | HEIGHT: 67 IN | DIASTOLIC BLOOD PRESSURE: 63 MMHG | HEART RATE: 64 BPM | OXYGEN SATURATION: 96 %

## 2025-04-28 PROCEDURE — 99215 OFFICE O/P EST HI 40 MIN: CPT | Mod: 25

## 2025-04-28 PROCEDURE — 94010 BREATHING CAPACITY TEST: CPT

## 2025-04-28 PROCEDURE — 85018 HEMOGLOBIN: CPT | Mod: QW

## 2025-04-28 PROCEDURE — 94727 GAS DIL/WSHOT DETER LNG VOL: CPT

## 2025-04-28 PROCEDURE — 94729 DIFFUSING CAPACITY: CPT

## 2025-04-28 RX ORDER — SULFAMETHOXAZOLE AND TRIMETHOPRIM 800; 160 MG/1; MG/1
800-160 TABLET ORAL DAILY
Qty: 12 | Refills: 5 | Status: ACTIVE | COMMUNITY
Start: 2025-04-28 | End: 1900-01-01

## 2025-04-28 RX ORDER — MYCOPHENOLATE MOFETIL 250 MG/1
250 CAPSULE ORAL TWICE DAILY
Qty: 240 | Refills: 1 | Status: ACTIVE | COMMUNITY
Start: 2025-04-28 | End: 1900-01-01

## 2025-04-28 RX ORDER — PREDNISONE 20 MG/1
20 TABLET ORAL DAILY
Qty: 45 | Refills: 0 | Status: ACTIVE | COMMUNITY
Start: 2025-04-28 | End: 1900-01-01

## 2025-04-30 ENCOUNTER — NON-APPOINTMENT (OUTPATIENT)
Age: 75
End: 2025-04-30

## 2025-05-01 ENCOUNTER — NON-APPOINTMENT (OUTPATIENT)
Age: 75
End: 2025-05-01

## 2025-05-01 ENCOUNTER — APPOINTMENT (OUTPATIENT)
Dept: INTERNAL MEDICINE | Facility: CLINIC | Age: 75
End: 2025-05-01
Payer: MEDICARE

## 2025-05-01 VITALS
WEIGHT: 250 LBS | DIASTOLIC BLOOD PRESSURE: 60 MMHG | SYSTOLIC BLOOD PRESSURE: 115 MMHG | OXYGEN SATURATION: 94 % | BODY MASS INDEX: 39.24 KG/M2 | HEART RATE: 71 BPM | HEIGHT: 67 IN

## 2025-05-01 DIAGNOSIS — J96.11 CHRONIC RESPIRATORY FAILURE WITH HYPOXIA: ICD-10-CM

## 2025-05-01 DIAGNOSIS — I10 ESSENTIAL (PRIMARY) HYPERTENSION: ICD-10-CM

## 2025-05-01 DIAGNOSIS — E11.49 TYPE 2 DIABETES MELLITUS WITH OTHER DIABETIC NEUROLOGICAL COMPLICATION: ICD-10-CM

## 2025-05-01 DIAGNOSIS — J84.9 INTERSTITIAL PULMONARY DISEASE, UNSPECIFIED: ICD-10-CM

## 2025-05-01 DIAGNOSIS — Z99.81 CHRONIC RESPIRATORY FAILURE WITH HYPOXIA: ICD-10-CM

## 2025-05-01 DIAGNOSIS — G47.33 OBSTRUCTIVE SLEEP APNEA (ADULT) (PEDIATRIC): ICD-10-CM

## 2025-05-01 DIAGNOSIS — J44.9 CHRONIC OBSTRUCTIVE PULMONARY DISEASE, UNSPECIFIED: ICD-10-CM

## 2025-05-01 DIAGNOSIS — N18.31 CHRONIC KIDNEY DISEASE, STAGE 3A: ICD-10-CM

## 2025-05-01 PROCEDURE — 36415 COLL VENOUS BLD VENIPUNCTURE: CPT

## 2025-05-01 PROCEDURE — 99214 OFFICE O/P EST MOD 30 MIN: CPT

## 2025-05-01 PROCEDURE — G2211 COMPLEX E/M VISIT ADD ON: CPT

## 2025-05-02 ENCOUNTER — OFFICE (OUTPATIENT)
Dept: URBAN - METROPOLITAN AREA CLINIC 63 | Facility: CLINIC | Age: 75
Setting detail: OPHTHALMOLOGY
End: 2025-05-02
Payer: MEDICARE

## 2025-05-02 DIAGNOSIS — D31.32: ICD-10-CM

## 2025-05-02 DIAGNOSIS — E11.9: ICD-10-CM

## 2025-05-02 PROCEDURE — 92012 INTRM OPH EXAM EST PATIENT: CPT | Performed by: SPECIALIST

## 2025-05-02 PROCEDURE — 92134 CPTRZ OPH DX IMG PST SGM RTA: CPT | Performed by: SPECIALIST

## 2025-05-02 ASSESSMENT — CONFRONTATIONAL VISUAL FIELD TEST (CVF)
OD_FINDINGS: FULL
OS_FINDINGS: FULL

## 2025-05-02 ASSESSMENT — TONOMETRY
OD_IOP_MMHG: 18
OS_IOP_MMHG: 17

## 2025-05-02 ASSESSMENT — PACHYMETRY
OS_CT_UM: 583
OS_CT_CORRECTION: -3
OD_CT_UM: 588
OD_CT_CORRECTION: -3

## 2025-05-02 ASSESSMENT — LID EXAM ASSESSMENTS
OS_BLEPHARITIS: LLL LUL 2+
OD_BLEPHARITIS: RLL RUL 2+

## 2025-05-02 ASSESSMENT — VISUAL ACUITY
OD_BCVA: 20/20
OS_BCVA: 20/30+1

## 2025-05-07 LAB
ANION GAP SERPL CALC-SCNC: 14 MMOL/L
BASOPHILS # BLD AUTO: 0.05 K/UL
BASOPHILS NFR BLD AUTO: 0.5 %
BUN SERPL-MCNC: 21 MG/DL
CALCIUM SERPL-MCNC: 10 MG/DL
CHLORIDE SERPL-SCNC: 101 MMOL/L
CHOLEST SERPL-MCNC: 176 MG/DL
CO2 SERPL-SCNC: 23 MMOL/L
CREAT SERPL-MCNC: 1.77 MG/DL
EGFRCR SERPLBLD CKD-EPI 2021: 39 ML/MIN/1.73M2
EOSINOPHIL # BLD AUTO: 0.17 K/UL
EOSINOPHIL NFR BLD AUTO: 1.6 %
ESTIMATED AVERAGE GLUCOSE: 171 MG/DL
GLUCOSE SERPL-MCNC: 156 MG/DL
HBA1C MFR BLD HPLC: 7.6 %
HCT VFR BLD CALC: 41.3 %
HDLC SERPL-MCNC: 48 MG/DL
HGB BLD-MCNC: 13.2 G/DL
IMM GRANULOCYTES NFR BLD AUTO: 0.7 %
LDLC SERPL-MCNC: 102 MG/DL
LYMPHOCYTES # BLD AUTO: 1.29 K/UL
LYMPHOCYTES NFR BLD AUTO: 11.8 %
MAN DIFF?: NORMAL
MCHC RBC-ENTMCNC: 30.4 PG
MCHC RBC-ENTMCNC: 32 G/DL
MCV RBC AUTO: 95.2 FL
MONOCYTES # BLD AUTO: 0.31 K/UL
MONOCYTES NFR BLD AUTO: 2.8 %
NEUTROPHILS # BLD AUTO: 8.99 K/UL
NEUTROPHILS NFR BLD AUTO: 82.6 %
NONHDLC SERPL-MCNC: 128 MG/DL
PLATELET # BLD AUTO: 279 K/UL
POTASSIUM SERPL-SCNC: 5.1 MMOL/L
RBC # BLD: 4.34 M/UL
RBC # FLD: 15.2 %
SODIUM SERPL-SCNC: 138 MMOL/L
TRIGL SERPL-MCNC: 147 MG/DL
WBC # FLD AUTO: 10.89 K/UL

## 2025-05-26 NOTE — PROGRESS NOTE ADULT - TIME-BASED BILLING (NON-CRITICAL CARE)
Time-based billing (NON-critical care)
warm and dry
Time-based billing (NON-critical care)

## (undated) DEVICE — UNDERPAD LINEN SAVER 23 X 36"

## (undated) DEVICE — FORCEP RADIAL JAW 4 W NDL 2.4MM 2.8MM 240CM ORANGE DISP

## (undated) DEVICE — POLY TRAP ETRAP

## (undated) DEVICE — WARMING BLANKET FULL ADULT

## (undated) DEVICE — TUBING IV EXTENSION MACRO W CLAVE 7"

## (undated) DEVICE — STERIS DEFENDO 3-PIECE KIT (AIR/WATER, SUCTION & BIOPSY VALVES)

## (undated) DEVICE — GOWN IMPERV XL

## (undated) DEVICE — SOL BAG NS 0.9% 1000ML

## (undated) DEVICE — MASK PROC EAR LOOP

## (undated) DEVICE — SYR SLIP 10CC

## (undated) DEVICE — ELCTR GROUNDING PAD ADULT COVIDIEN

## (undated) DEVICE — TUBING ALARIS PUMP MODULE NON-DEHP

## (undated) DEVICE — SOL IRR BAG H2O 1000ML

## (undated) DEVICE — DENTURE CUP PINK

## (undated) DEVICE — DRSG 2X2

## (undated) DEVICE — SOL IRR BAG NS 0.9% 1000ML

## (undated) DEVICE — PACK IV START WITH CHG

## (undated) DEVICE — SYR LUER SLIP TIP 50CC

## (undated) DEVICE — CATH IV SAFE BC 22G X 1" (BLUE)

## (undated) DEVICE — VENODYNE/SCD SLEEVE CALF MEDIUM

## (undated) DEVICE — BITE BLOCK ADULT 20 X 27MM (GREEN)

## (undated) DEVICE — SENSOR O2 FINGER ADULT

## (undated) DEVICE — DRSG CURITY GAUZE SPONGE 4 X 4" 12-PLY NON-STERILE

## (undated) DEVICE — SYR IV FLUSH SALINE 10ML 30/TY

## (undated) DEVICE — SNARE MINI-MICRO OVAL